# Patient Record
Sex: MALE | Race: WHITE | Employment: OTHER | URBAN - METROPOLITAN AREA
[De-identification: names, ages, dates, MRNs, and addresses within clinical notes are randomized per-mention and may not be internally consistent; named-entity substitution may affect disease eponyms.]

---

## 2017-08-27 ENCOUNTER — HOSPITAL ENCOUNTER (EMERGENCY)
Facility: CLINIC | Age: 72
Discharge: HOME | End: 2017-08-27
Attending: EMERGENCY MEDICINE

## 2017-08-27 VITALS
DIASTOLIC BLOOD PRESSURE: 84 MMHG | WEIGHT: 127 LBS | TEMPERATURE: 97.5 F | SYSTOLIC BLOOD PRESSURE: 100 MMHG | RESPIRATION RATE: 16 BRPM | BODY MASS INDEX: 18.18 KG/M2 | HEIGHT: 70 IN | OXYGEN SATURATION: 99 % | HEART RATE: 91 BPM

## 2017-08-27 DIAGNOSIS — E87.1 DEHYDRATION WITH HYPONATREMIA: ICD-10-CM

## 2017-08-27 DIAGNOSIS — R11.11 NON-INTRACTABLE VOMITING WITHOUT NAUSEA, UNSPECIFIED VOMITING TYPE: Primary | ICD-10-CM

## 2017-08-27 DIAGNOSIS — E86.0 DEHYDRATION WITH HYPONATREMIA: ICD-10-CM

## 2017-08-27 LAB
-: ABNORMAL
ABSOLUTE EOS #: 0 K/UL (ref 0–0.4)
ABSOLUTE LYMPH #: 1.1 K/UL (ref 1–4.8)
ABSOLUTE MONO #: 0.6 K/UL (ref 0.1–1.2)
ALBUMIN SERPL-MCNC: 3.9 G/DL (ref 3.5–5.2)
ALBUMIN/GLOBULIN RATIO: 1.3 (ref 1–2.5)
ALP BLD-CCNC: 95 U/L (ref 40–129)
ALT SERPL-CCNC: 38 U/L (ref 5–41)
AMORPHOUS: ABNORMAL
ANION GAP SERPL CALCULATED.3IONS-SCNC: 20 MMOL/L (ref 9–17)
AST SERPL-CCNC: 26 U/L
BACTERIA: ABNORMAL
BASOPHILS # BLD: 1 %
BASOPHILS ABSOLUTE: 0.1 K/UL (ref 0–0.2)
BILIRUB SERPL-MCNC: 2.4 MG/DL (ref 0.3–1.2)
BILIRUBIN URINE: ABNORMAL
BUN BLDV-MCNC: 13 MG/DL (ref 8–23)
BUN/CREAT BLD: ABNORMAL (ref 9–20)
CALCIUM SERPL-MCNC: 9.5 MG/DL (ref 8.6–10.4)
CASTS UA: ABNORMAL /LPF (ref 0–2)
CHLORIDE BLD-SCNC: 86 MMOL/L (ref 98–107)
CO2: 22 MMOL/L (ref 20–31)
COLOR: ABNORMAL
COMMENT UA: ABNORMAL
CREAT SERPL-MCNC: 0.7 MG/DL (ref 0.7–1.2)
CRYSTALS, UA: ABNORMAL /HPF
DIFFERENTIAL TYPE: ABNORMAL
EOSINOPHILS RELATIVE PERCENT: 0 %
EPITHELIAL CELLS UA: ABNORMAL /HPF (ref 0–5)
GFR AFRICAN AMERICAN: >60 ML/MIN
GFR NON-AFRICAN AMERICAN: >60 ML/MIN
GFR SERPL CREATININE-BSD FRML MDRD: ABNORMAL ML/MIN/{1.73_M2}
GFR SERPL CREATININE-BSD FRML MDRD: ABNORMAL ML/MIN/{1.73_M2}
GLUCOSE BLD-MCNC: 127 MG/DL (ref 70–99)
GLUCOSE URINE: NEGATIVE
HCT VFR BLD CALC: 39.3 % (ref 41–53)
HEMOGLOBIN: 13.4 G/DL (ref 13.5–17.5)
KETONES, URINE: ABNORMAL
LEUKOCYTE ESTERASE, URINE: ABNORMAL
LIPASE: 39 U/L (ref 13–60)
LYMPHOCYTES # BLD: 10 %
MCH RBC QN AUTO: 32.7 PG (ref 26–34)
MCHC RBC AUTO-ENTMCNC: 34.2 G/DL (ref 31–37)
MCV RBC AUTO: 95.6 FL (ref 80–100)
MONOCYTES # BLD: 5 %
MUCUS: ABNORMAL
NITRITE, URINE: NEGATIVE
OTHER OBSERVATIONS UA: ABNORMAL
PDW BLD-RTO: 12.8 % (ref 12.5–15.4)
PH UA: 5.5 (ref 5–8)
PLATELET # BLD: 175 K/UL (ref 140–450)
PLATELET ESTIMATE: ABNORMAL
PMV BLD AUTO: 10.9 FL (ref 6–12)
POTASSIUM SERPL-SCNC: 3.4 MMOL/L (ref 3.7–5.3)
PROTEIN UA: ABNORMAL
RBC # BLD: 4.11 M/UL (ref 4.5–5.9)
RBC # BLD: ABNORMAL 10*6/UL
RBC UA: ABNORMAL /HPF (ref 0–2)
RENAL EPITHELIAL, UA: ABNORMAL /HPF
SEG NEUTROPHILS: 84 %
SEGMENTED NEUTROPHILS ABSOLUTE COUNT: 9.9 K/UL (ref 1.8–7.7)
SODIUM BLD-SCNC: 128 MMOL/L (ref 135–144)
SPECIFIC GRAVITY UA: 1.01 (ref 1–1.03)
TOTAL PROTEIN: 7 G/DL (ref 6.4–8.3)
TRICHOMONAS: ABNORMAL
TURBIDITY: ABNORMAL
URINE HGB: ABNORMAL
UROBILINOGEN, URINE: ABNORMAL
WBC # BLD: 11.8 K/UL (ref 3.5–11)
WBC # BLD: ABNORMAL 10*3/UL
WBC UA: ABNORMAL /HPF (ref 0–5)
YEAST: ABNORMAL

## 2017-08-27 PROCEDURE — 36415 COLL VENOUS BLD VENIPUNCTURE: CPT

## 2017-08-27 PROCEDURE — 81001 URINALYSIS AUTO W/SCOPE: CPT

## 2017-08-27 PROCEDURE — 2580000003 HC RX 258: Performed by: EMERGENCY MEDICINE

## 2017-08-27 PROCEDURE — 83690 ASSAY OF LIPASE: CPT

## 2017-08-27 PROCEDURE — 96360 HYDRATION IV INFUSION INIT: CPT

## 2017-08-27 PROCEDURE — 87086 URINE CULTURE/COLONY COUNT: CPT

## 2017-08-27 PROCEDURE — 99284 EMERGENCY DEPT VISIT MOD MDM: CPT

## 2017-08-27 PROCEDURE — 6370000000 HC RX 637 (ALT 250 FOR IP): Performed by: EMERGENCY MEDICINE

## 2017-08-27 PROCEDURE — 85025 COMPLETE CBC W/AUTO DIFF WBC: CPT

## 2017-08-27 PROCEDURE — 80053 COMPREHEN METABOLIC PANEL: CPT

## 2017-08-27 RX ORDER — ONDANSETRON 4 MG/1
4 TABLET, ORALLY DISINTEGRATING ORAL EVERY 8 HOURS PRN
Qty: 6 TABLET | Refills: 0 | Status: SHIPPED | OUTPATIENT
Start: 2017-08-27 | End: 2020-09-19

## 2017-08-27 RX ORDER — LORAZEPAM 0.5 MG/1
0.5 TABLET ORAL ONCE
Status: COMPLETED | OUTPATIENT
Start: 2017-08-27 | End: 2017-08-27

## 2017-08-27 RX ORDER — ATENOLOL 25 MG/1
25 TABLET ORAL DAILY
COMMUNITY
End: 2020-09-19

## 2017-08-27 RX ORDER — 0.9 % SODIUM CHLORIDE 0.9 %
1000 INTRAVENOUS SOLUTION INTRAVENOUS ONCE
Status: COMPLETED | OUTPATIENT
Start: 2017-08-27 | End: 2017-08-27

## 2017-08-27 RX ADMIN — SODIUM CHLORIDE 1000 ML: 9 INJECTION, SOLUTION INTRAVENOUS at 22:17

## 2017-08-27 RX ADMIN — LORAZEPAM 0.5 MG: 0.5 TABLET ORAL at 22:47

## 2017-08-29 LAB
CULTURE: NORMAL
CULTURE: NORMAL
Lab: NORMAL
SPECIMEN DESCRIPTION: NORMAL
SPECIMEN DESCRIPTION: NORMAL
STATUS: NORMAL

## 2018-04-24 ENCOUNTER — HOSPITAL ENCOUNTER (OUTPATIENT)
Age: 73
Setting detail: SPECIMEN
Discharge: HOME | End: 2018-04-24

## 2018-04-24 LAB
ANION GAP SERPL CALCULATED.3IONS-SCNC: 12 MMOL/L (ref 9–17)
BUN BLDV-MCNC: 8 MG/DL (ref 8–23)
BUN/CREAT BLD: ABNORMAL (ref 9–20)
CALCIUM SERPL-MCNC: 8.4 MG/DL (ref 8.6–10.4)
CHLORIDE BLD-SCNC: 93 MMOL/L (ref 98–107)
CO2: 23 MMOL/L (ref 20–31)
CREAT SERPL-MCNC: 0.41 MG/DL (ref 0.7–1.2)
GFR AFRICAN AMERICAN: >60 ML/MIN
GFR NON-AFRICAN AMERICAN: >60 ML/MIN
GFR SERPL CREATININE-BSD FRML MDRD: ABNORMAL ML/MIN/{1.73_M2}
GFR SERPL CREATININE-BSD FRML MDRD: ABNORMAL ML/MIN/{1.73_M2}
GLUCOSE BLD-MCNC: 80 MG/DL (ref 70–99)
POTASSIUM SERPL-SCNC: 4.2 MMOL/L (ref 3.7–5.3)
SERUM OSMOLALITY: 265 MOSM/KG (ref 275–295)
SODIUM BLD-SCNC: 128 MMOL/L (ref 135–144)

## 2018-04-24 PROCEDURE — 83930 ASSAY OF BLOOD OSMOLALITY: CPT

## 2018-04-24 PROCEDURE — 80048 BASIC METABOLIC PNL TOTAL CA: CPT

## 2018-04-24 PROCEDURE — P9603 ONE-WAY ALLOW PRORATED MILES: HCPCS

## 2018-04-24 PROCEDURE — 36415 COLL VENOUS BLD VENIPUNCTURE: CPT

## 2018-04-26 ENCOUNTER — HOSPITAL ENCOUNTER (OUTPATIENT)
Age: 73
Setting detail: SPECIMEN
Discharge: HOME | End: 2018-04-26

## 2018-04-26 LAB
AMMONIA: 34 UMOL/L (ref 16–60)
ANION GAP SERPL CALCULATED.3IONS-SCNC: 14 MMOL/L (ref 9–17)
BUN BLDV-MCNC: 7 MG/DL (ref 8–23)
BUN/CREAT BLD: ABNORMAL (ref 9–20)
CALCIUM SERPL-MCNC: 8.4 MG/DL (ref 8.6–10.4)
CHLORIDE BLD-SCNC: 94 MMOL/L (ref 98–107)
CO2: 21 MMOL/L (ref 20–31)
CREAT SERPL-MCNC: 0.43 MG/DL (ref 0.7–1.2)
GFR AFRICAN AMERICAN: >60 ML/MIN
GFR NON-AFRICAN AMERICAN: >60 ML/MIN
GFR SERPL CREATININE-BSD FRML MDRD: ABNORMAL ML/MIN/{1.73_M2}
GFR SERPL CREATININE-BSD FRML MDRD: ABNORMAL ML/MIN/{1.73_M2}
GLUCOSE BLD-MCNC: 82 MG/DL (ref 70–99)
HCT VFR BLD CALC: 25.4 % (ref 40.7–50.3)
HEMOGLOBIN: 8 G/DL (ref 13–17)
MCH RBC QN AUTO: 30.7 PG (ref 25.2–33.5)
MCHC RBC AUTO-ENTMCNC: 31.5 G/DL (ref 28.4–34.8)
MCV RBC AUTO: 97.3 FL (ref 82.6–102.9)
NRBC AUTOMATED: 0 PER 100 WBC
PDW BLD-RTO: 15.6 % (ref 11.8–14.4)
PLATELET # BLD: 258 K/UL (ref 138–453)
PMV BLD AUTO: 11.7 FL (ref 8.1–13.5)
POTASSIUM SERPL-SCNC: 4.3 MMOL/L (ref 3.7–5.3)
PREALBUMIN: 14.8 MG/DL (ref 20–40)
RBC # BLD: 2.61 M/UL (ref 4.21–5.77)
SERUM OSMOLALITY: 280 MOSM/KG (ref 275–295)
SODIUM BLD-SCNC: 129 MMOL/L (ref 135–144)
VITAMIN D 25-HYDROXY: 13.1 NG/ML (ref 30–100)
WBC # BLD: 5.7 K/UL (ref 3.5–11.3)

## 2018-04-26 PROCEDURE — 82306 VITAMIN D 25 HYDROXY: CPT

## 2018-04-26 PROCEDURE — 87205 SMEAR GRAM STAIN: CPT

## 2018-04-26 PROCEDURE — 84134 ASSAY OF PREALBUMIN: CPT

## 2018-04-26 PROCEDURE — 85027 COMPLETE CBC AUTOMATED: CPT

## 2018-04-26 PROCEDURE — 87077 CULTURE AEROBIC IDENTIFY: CPT

## 2018-04-26 PROCEDURE — 86403 PARTICLE AGGLUT ANTBDY SCRN: CPT

## 2018-04-26 PROCEDURE — 80048 BASIC METABOLIC PNL TOTAL CA: CPT

## 2018-04-26 PROCEDURE — 87186 SC STD MICRODIL/AGAR DIL: CPT

## 2018-04-26 PROCEDURE — 87040 BLOOD CULTURE FOR BACTERIA: CPT

## 2018-04-26 PROCEDURE — 36415 COLL VENOUS BLD VENIPUNCTURE: CPT

## 2018-04-26 PROCEDURE — 87070 CULTURE OTHR SPECIMN AEROBIC: CPT

## 2018-04-26 PROCEDURE — 82140 ASSAY OF AMMONIA: CPT

## 2018-04-26 PROCEDURE — 83930 ASSAY OF BLOOD OSMOLALITY: CPT

## 2018-04-26 PROCEDURE — P9603 ONE-WAY ALLOW PRORATED MILES: HCPCS

## 2018-04-28 LAB
CULTURE: ABNORMAL
DIRECT EXAM: ABNORMAL
DIRECT EXAM: ABNORMAL
Lab: ABNORMAL
ORGANISM: ABNORMAL
ORGANISM: ABNORMAL
SPECIMEN DESCRIPTION: ABNORMAL
SPECIMEN DESCRIPTION: ABNORMAL
STATUS: ABNORMAL

## 2018-04-29 ENCOUNTER — HOSPITAL ENCOUNTER (OUTPATIENT)
Age: 73
Setting detail: SPECIMEN
Discharge: HOME | End: 2018-04-29

## 2018-04-30 ENCOUNTER — HOSPITAL ENCOUNTER (OUTPATIENT)
Age: 73
Setting detail: SPECIMEN
Discharge: HOME | End: 2018-04-30

## 2018-04-30 LAB
CULTURE: ABNORMAL
DATE, STOOL #1: NORMAL
DATE, STOOL #2: NORMAL
DATE, STOOL #3: NORMAL
HEMOCCULT SP1 STL QL: NEGATIVE
HEMOCCULT SP2 STL QL: NEGATIVE
HEMOCCULT SP3 STL QL: NORMAL
Lab: ABNORMAL
SPECIMEN DESCRIPTION: ABNORMAL
SPECIMEN DESCRIPTION: ABNORMAL
STATUS: ABNORMAL
TIME, STOOL #1: 1835
TIME, STOOL #2: 600
TIME, STOOL #3: NORMAL

## 2018-04-30 PROCEDURE — 82272 OCCULT BLD FECES 1-3 TESTS: CPT

## 2018-05-01 ENCOUNTER — HOSPITAL ENCOUNTER (OUTPATIENT)
Age: 73
Setting detail: SPECIMEN
Discharge: HOME | End: 2018-05-01

## 2018-05-01 LAB
ANION GAP SERPL CALCULATED.3IONS-SCNC: 11 MMOL/L (ref 9–17)
BUN BLDV-MCNC: 12 MG/DL (ref 8–23)
BUN/CREAT BLD: 19 (ref 9–20)
CALCIUM SERPL-MCNC: 8.9 MG/DL (ref 8.6–10.4)
CHLORIDE BLD-SCNC: 100 MMOL/L (ref 98–107)
CO2: 25 MMOL/L (ref 20–31)
CREAT SERPL-MCNC: 0.63 MG/DL (ref 0.7–1.2)
DATE, STOOL #1: NORMAL
DATE, STOOL #2: NORMAL
DATE, STOOL #3: NORMAL
GFR AFRICAN AMERICAN: >60 ML/MIN
GFR NON-AFRICAN AMERICAN: >60 ML/MIN
GFR SERPL CREATININE-BSD FRML MDRD: ABNORMAL ML/MIN/{1.73_M2}
GFR SERPL CREATININE-BSD FRML MDRD: ABNORMAL ML/MIN/{1.73_M2}
GLUCOSE BLD-MCNC: 95 MG/DL (ref 70–99)
HEMOCCULT SP1 STL QL: NEGATIVE
HEMOCCULT SP2 STL QL: NORMAL
HEMOCCULT SP3 STL QL: NORMAL
POTASSIUM SERPL-SCNC: 4.6 MMOL/L (ref 3.7–5.3)
SODIUM BLD-SCNC: 136 MMOL/L (ref 135–144)
TIME, STOOL #1: NORMAL
TIME, STOOL #2: NORMAL
TIME, STOOL #3: NORMAL

## 2018-05-01 PROCEDURE — 80048 BASIC METABOLIC PNL TOTAL CA: CPT

## 2018-05-01 PROCEDURE — 82272 OCCULT BLD FECES 1-3 TESTS: CPT

## 2018-05-11 ENCOUNTER — HOSPITAL ENCOUNTER (OUTPATIENT)
Age: 73
Setting detail: SPECIMEN
Discharge: HOME | End: 2018-05-11

## 2018-05-11 LAB
ANION GAP SERPL CALCULATED.3IONS-SCNC: 11 MMOL/L (ref 9–17)
BUN BLDV-MCNC: 12 MG/DL (ref 8–23)
BUN/CREAT BLD: ABNORMAL (ref 9–20)
CALCIUM SERPL-MCNC: 8.8 MG/DL (ref 8.6–10.4)
CHLORIDE BLD-SCNC: 103 MMOL/L (ref 98–107)
CO2: 25 MMOL/L (ref 20–31)
CREAT SERPL-MCNC: 0.44 MG/DL (ref 0.7–1.2)
GFR AFRICAN AMERICAN: >60 ML/MIN
GFR NON-AFRICAN AMERICAN: >60 ML/MIN
GFR SERPL CREATININE-BSD FRML MDRD: ABNORMAL ML/MIN/{1.73_M2}
GFR SERPL CREATININE-BSD FRML MDRD: ABNORMAL ML/MIN/{1.73_M2}
GLUCOSE BLD-MCNC: 95 MG/DL (ref 70–99)
HCT VFR BLD CALC: 26.8 % (ref 40.7–50.3)
HEMOGLOBIN: 8.4 G/DL (ref 13–17)
MCH RBC QN AUTO: 30.2 PG (ref 25.2–33.5)
MCHC RBC AUTO-ENTMCNC: 31.3 G/DL (ref 28.4–34.8)
MCV RBC AUTO: 96.4 FL (ref 82.6–102.9)
NRBC AUTOMATED: 0 PER 100 WBC
PDW BLD-RTO: 13.9 % (ref 11.8–14.4)
PLATELET # BLD: 161 K/UL (ref 138–453)
PMV BLD AUTO: 12.1 FL (ref 8.1–13.5)
POTASSIUM SERPL-SCNC: 4.3 MMOL/L (ref 3.7–5.3)
RBC # BLD: 2.78 M/UL (ref 4.21–5.77)
SODIUM BLD-SCNC: 139 MMOL/L (ref 135–144)
WBC # BLD: 4.9 K/UL (ref 3.5–11.3)

## 2018-05-11 PROCEDURE — 85027 COMPLETE CBC AUTOMATED: CPT

## 2018-05-11 PROCEDURE — 80048 BASIC METABOLIC PNL TOTAL CA: CPT

## 2018-05-11 PROCEDURE — 36415 COLL VENOUS BLD VENIPUNCTURE: CPT

## 2018-05-11 PROCEDURE — P9603 ONE-WAY ALLOW PRORATED MILES: HCPCS

## 2018-06-01 ENCOUNTER — HOSPITAL ENCOUNTER (OUTPATIENT)
Age: 73
Setting detail: SPECIMEN
Discharge: HOME | End: 2018-06-01

## 2018-06-01 LAB
HCT VFR BLD CALC: 28.7 % (ref 40.7–50.3)
HEMOGLOBIN: 8.8 G/DL (ref 13–17)

## 2018-06-01 PROCEDURE — 85018 HEMOGLOBIN: CPT

## 2018-06-01 PROCEDURE — 85014 HEMATOCRIT: CPT

## 2018-06-01 PROCEDURE — P9603 ONE-WAY ALLOW PRORATED MILES: HCPCS

## 2018-06-01 PROCEDURE — 36415 COLL VENOUS BLD VENIPUNCTURE: CPT

## 2018-06-22 ENCOUNTER — HOSPITAL ENCOUNTER (OUTPATIENT)
Age: 73
Setting detail: SPECIMEN
Discharge: HOME | End: 2018-06-22
Payer: MEDICARE

## 2018-06-22 LAB
ANION GAP SERPL CALCULATED.3IONS-SCNC: 11 MMOL/L (ref 9–17)
BUN BLDV-MCNC: 10 MG/DL (ref 8–23)
BUN/CREAT BLD: 19 (ref 9–20)
CALCIUM SERPL-MCNC: 9.1 MG/DL (ref 8.6–10.4)
CHLORIDE BLD-SCNC: 102 MMOL/L (ref 98–107)
CO2: 26 MMOL/L (ref 20–31)
CREAT SERPL-MCNC: 0.53 MG/DL (ref 0.7–1.2)
GFR AFRICAN AMERICAN: >60 ML/MIN
GFR NON-AFRICAN AMERICAN: >60 ML/MIN
GFR SERPL CREATININE-BSD FRML MDRD: ABNORMAL ML/MIN/{1.73_M2}
GFR SERPL CREATININE-BSD FRML MDRD: ABNORMAL ML/MIN/{1.73_M2}
GLUCOSE BLD-MCNC: 89 MG/DL (ref 70–99)
HCT VFR BLD CALC: 30.9 % (ref 41–53)
HEMOGLOBIN: 10.1 G/DL (ref 13.5–17.5)
MCH RBC QN AUTO: 30.1 PG (ref 26–34)
MCHC RBC AUTO-ENTMCNC: 32.6 G/DL (ref 31–37)
MCV RBC AUTO: 92.2 FL (ref 80–100)
NRBC AUTOMATED: ABNORMAL PER 100 WBC
PDW BLD-RTO: 14.7 % (ref 11.5–14.5)
PLATELET # BLD: 181 K/UL (ref 130–400)
PMV BLD AUTO: 10.1 FL (ref 6–12)
POTASSIUM SERPL-SCNC: 4.3 MMOL/L (ref 3.7–5.3)
PREALBUMIN: 17.1 MG/DL (ref 20–40)
RBC # BLD: 3.35 M/UL (ref 4.5–5.9)
SODIUM BLD-SCNC: 139 MMOL/L (ref 135–144)
VITAMIN D 25-HYDROXY: 26.9 NG/ML (ref 30–100)
WBC # BLD: 6.5 K/UL (ref 3.5–11)

## 2018-06-22 PROCEDURE — P9603 ONE-WAY ALLOW PRORATED MILES: HCPCS

## 2018-06-22 PROCEDURE — 84134 ASSAY OF PREALBUMIN: CPT

## 2018-06-22 PROCEDURE — 36415 COLL VENOUS BLD VENIPUNCTURE: CPT

## 2018-06-22 PROCEDURE — 82306 VITAMIN D 25 HYDROXY: CPT

## 2018-06-22 PROCEDURE — 80048 BASIC METABOLIC PNL TOTAL CA: CPT

## 2018-06-22 PROCEDURE — 85027 COMPLETE CBC AUTOMATED: CPT

## 2020-09-19 ENCOUNTER — APPOINTMENT (OUTPATIENT)
Dept: CT IMAGING | Age: 75
DRG: 871 | End: 2020-09-19
Payer: COMMERCIAL

## 2020-09-19 ENCOUNTER — APPOINTMENT (OUTPATIENT)
Dept: GENERAL RADIOLOGY | Age: 75
DRG: 871 | End: 2020-09-19
Payer: COMMERCIAL

## 2020-09-19 ENCOUNTER — HOSPITAL ENCOUNTER (INPATIENT)
Age: 75
LOS: 10 days | Discharge: NURSING, REHAB OR LONG-TERM CARE | DRG: 871 | End: 2020-09-29
Attending: EMERGENCY MEDICINE | Admitting: INTERNAL MEDICINE
Payer: COMMERCIAL

## 2020-09-19 PROBLEM — R79.89 ELEVATED BRAIN NATRIURETIC PEPTIDE (BNP) LEVEL: Status: ACTIVE | Noted: 2020-09-19

## 2020-09-19 PROBLEM — I48.91 ATRIAL FIBRILLATION WITH RVR (HCC): Status: ACTIVE | Noted: 2020-09-19

## 2020-09-19 LAB
ABSOLUTE EOS #: <0.03 K/UL (ref 0–0.44)
ABSOLUTE IMMATURE GRANULOCYTE: 0.4 K/UL (ref 0–0.3)
ABSOLUTE LYMPH #: 1.43 K/UL (ref 1.1–3.7)
ABSOLUTE MONO #: 0.83 K/UL (ref 0.1–1.2)
AMYLASE: 41 U/L (ref 28–100)
ANION GAP SERPL CALCULATED.3IONS-SCNC: 12 MMOL/L (ref 9–17)
BASOPHILS # BLD: 0 % (ref 0–2)
BASOPHILS ABSOLUTE: <0.03 K/UL (ref 0–0.2)
BILIRUBIN URINE: NEGATIVE
BNP INTERPRETATION: ABNORMAL
BUN BLDV-MCNC: 17 MG/DL (ref 8–23)
BUN/CREAT BLD: 29 (ref 9–20)
C-REACTIVE PROTEIN: 99 MG/L (ref 0–5)
CALCIUM SERPL-MCNC: 7.3 MG/DL (ref 8.6–10.4)
CHLORIDE BLD-SCNC: 100 MMOL/L (ref 98–107)
CO2: 20 MMOL/L (ref 20–31)
COLOR: YELLOW
COMMENT UA: ABNORMAL
CREAT SERPL-MCNC: 0.58 MG/DL (ref 0.7–1.2)
DIFFERENTIAL TYPE: ABNORMAL
EOSINOPHILS RELATIVE PERCENT: 0 % (ref 1–4)
GFR AFRICAN AMERICAN: >60 ML/MIN
GFR NON-AFRICAN AMERICAN: >60 ML/MIN
GFR SERPL CREATININE-BSD FRML MDRD: ABNORMAL ML/MIN/{1.73_M2}
GFR SERPL CREATININE-BSD FRML MDRD: ABNORMAL ML/MIN/{1.73_M2}
GLUCOSE BLD-MCNC: 82 MG/DL (ref 70–99)
GLUCOSE URINE: NEGATIVE
HCT VFR BLD CALC: 26.7 % (ref 40.7–50.3)
HEMOGLOBIN: 7.6 G/DL (ref 13–17)
IMMATURE GRANULOCYTES: 2 %
INR BLD: 1.1
KETONES, URINE: ABNORMAL
LACTIC ACID, SEPSIS WHOLE BLOOD: NORMAL MMOL/L (ref 0.5–1.9)
LACTIC ACID, SEPSIS WHOLE BLOOD: NORMAL MMOL/L (ref 0.5–1.9)
LACTIC ACID, SEPSIS: 1.2 MMOL/L (ref 0.5–1.9)
LACTIC ACID, SEPSIS: 1.8 MMOL/L (ref 0.5–1.9)
LACTIC ACID: 0.8 MMOL/L (ref 0.5–2.2)
LACTIC ACID: 2.7 MMOL/L (ref 0.5–2.2)
LEUKOCYTE ESTERASE, URINE: NEGATIVE
LIPASE: 5 U/L (ref 13–60)
LYMPHOCYTES # BLD: 8 % (ref 24–43)
MAGNESIUM: 1.8 MG/DL (ref 1.6–2.6)
MCH RBC QN AUTO: 21.8 PG (ref 25.2–33.5)
MCHC RBC AUTO-ENTMCNC: 28.5 G/DL (ref 28.4–34.8)
MCV RBC AUTO: 76.5 FL (ref 82.6–102.9)
MONOCYTES # BLD: 5 % (ref 3–12)
NITRITE, URINE: NEGATIVE
NRBC AUTOMATED: 0 PER 100 WBC
PARTIAL THROMBOPLASTIN TIME: 34.2 SEC (ref 23.9–33.8)
PDW BLD-RTO: 18 % (ref 11.8–14.4)
PH UA: 5.5 (ref 5–8)
PLATELET # BLD: 502 K/UL (ref 138–453)
PLATELET ESTIMATE: ABNORMAL
PMV BLD AUTO: 10 FL (ref 8.1–13.5)
POTASSIUM SERPL-SCNC: 3.8 MMOL/L (ref 3.7–5.3)
PRO-BNP: 800 PG/ML
PROTEIN UA: NEGATIVE
PROTHROMBIN TIME: 13.7 SEC (ref 11.5–14.2)
RBC # BLD: 3.49 M/UL (ref 4.21–5.77)
RBC # BLD: ABNORMAL 10*6/UL
SEDIMENTATION RATE, ERYTHROCYTE: 12 MM (ref 0–20)
SEG NEUTROPHILS: 84 % (ref 36–65)
SEGMENTED NEUTROPHILS ABSOLUTE COUNT: 14.37 K/UL (ref 1.5–8.1)
SODIUM BLD-SCNC: 132 MMOL/L (ref 135–144)
SPECIFIC GRAVITY UA: 1.02 (ref 1–1.03)
TROPONIN INTERP: NORMAL
TROPONIN INTERP: NORMAL
TROPONIN T: NORMAL NG/ML
TROPONIN T: NORMAL NG/ML
TROPONIN, HIGH SENSITIVITY: 12 NG/L (ref 0–22)
TROPONIN, HIGH SENSITIVITY: 21 NG/L (ref 0–22)
TURBIDITY: CLEAR
URINE HGB: NEGATIVE
UROBILINOGEN, URINE: NORMAL
WBC # BLD: 17.1 K/UL (ref 3.5–11.3)
WBC # BLD: ABNORMAL 10*3/UL

## 2020-09-19 PROCEDURE — 2500000003 HC RX 250 WO HCPCS: Performed by: EMERGENCY MEDICINE

## 2020-09-19 PROCEDURE — 84145 PROCALCITONIN (PCT): CPT

## 2020-09-19 PROCEDURE — 2060000000 HC ICU INTERMEDIATE R&B

## 2020-09-19 PROCEDURE — 2580000003 HC RX 258: Performed by: EMERGENCY MEDICINE

## 2020-09-19 PROCEDURE — 82150 ASSAY OF AMYLASE: CPT

## 2020-09-19 PROCEDURE — 85610 PROTHROMBIN TIME: CPT

## 2020-09-19 PROCEDURE — 86140 C-REACTIVE PROTEIN: CPT

## 2020-09-19 PROCEDURE — 85025 COMPLETE CBC W/AUTO DIFF WBC: CPT

## 2020-09-19 PROCEDURE — 83690 ASSAY OF LIPASE: CPT

## 2020-09-19 PROCEDURE — 85730 THROMBOPLASTIN TIME PARTIAL: CPT

## 2020-09-19 PROCEDURE — 2580000003 HC RX 258: Performed by: INTERNAL MEDICINE

## 2020-09-19 PROCEDURE — 2580000003 HC RX 258: Performed by: FAMILY MEDICINE

## 2020-09-19 PROCEDURE — 83880 ASSAY OF NATRIURETIC PEPTIDE: CPT

## 2020-09-19 PROCEDURE — 80048 BASIC METABOLIC PNL TOTAL CA: CPT

## 2020-09-19 PROCEDURE — 87040 BLOOD CULTURE FOR BACTERIA: CPT

## 2020-09-19 PROCEDURE — 6360000004 HC RX CONTRAST MEDICATION: Performed by: EMERGENCY MEDICINE

## 2020-09-19 PROCEDURE — 93005 ELECTROCARDIOGRAM TRACING: CPT | Performed by: EMERGENCY MEDICINE

## 2020-09-19 PROCEDURE — 83735 ASSAY OF MAGNESIUM: CPT

## 2020-09-19 PROCEDURE — 99223 1ST HOSP IP/OBS HIGH 75: CPT | Performed by: FAMILY MEDICINE

## 2020-09-19 PROCEDURE — 36415 COLL VENOUS BLD VENIPUNCTURE: CPT

## 2020-09-19 PROCEDURE — 6360000002 HC RX W HCPCS: Performed by: FAMILY MEDICINE

## 2020-09-19 PROCEDURE — 82533 TOTAL CORTISOL: CPT

## 2020-09-19 PROCEDURE — 71260 CT THORAX DX C+: CPT

## 2020-09-19 PROCEDURE — 99285 EMERGENCY DEPT VISIT HI MDM: CPT

## 2020-09-19 PROCEDURE — 96365 THER/PROPH/DIAG IV INF INIT: CPT

## 2020-09-19 PROCEDURE — 6370000000 HC RX 637 (ALT 250 FOR IP): Performed by: FAMILY MEDICINE

## 2020-09-19 PROCEDURE — 87150 DNA/RNA AMPLIFIED PROBE: CPT

## 2020-09-19 PROCEDURE — 83605 ASSAY OF LACTIC ACID: CPT

## 2020-09-19 PROCEDURE — 71045 X-RAY EXAM CHEST 1 VIEW: CPT

## 2020-09-19 PROCEDURE — 81003 URINALYSIS AUTO W/O SCOPE: CPT

## 2020-09-19 PROCEDURE — 87205 SMEAR GRAM STAIN: CPT

## 2020-09-19 PROCEDURE — 84484 ASSAY OF TROPONIN QUANT: CPT

## 2020-09-19 PROCEDURE — 96361 HYDRATE IV INFUSION ADD-ON: CPT

## 2020-09-19 PROCEDURE — 85652 RBC SED RATE AUTOMATED: CPT

## 2020-09-19 PROCEDURE — 2500000003 HC RX 250 WO HCPCS: Performed by: INTERNAL MEDICINE

## 2020-09-19 PROCEDURE — 74177 CT ABD & PELVIS W/CONTRAST: CPT

## 2020-09-19 RX ORDER — AMOXICILLIN 250 MG
2 CAPSULE ORAL DAILY
COMMUNITY

## 2020-09-19 RX ORDER — ESCITALOPRAM OXALATE 10 MG/1
20 TABLET ORAL DAILY
Status: DISCONTINUED | OUTPATIENT
Start: 2020-09-19 | End: 2020-09-29 | Stop reason: HOSPADM

## 2020-09-19 RX ORDER — DILTIAZEM HYDROCHLORIDE 5 MG/ML
10 INJECTION INTRAVENOUS ONCE
Status: COMPLETED | OUTPATIENT
Start: 2020-09-19 | End: 2020-09-19

## 2020-09-19 RX ORDER — ERGOCALCIFEROL 1.25 MG/1
50000 CAPSULE ORAL
COMMUNITY

## 2020-09-19 RX ORDER — ERGOCALCIFEROL 1.25 MG/1
50000 CAPSULE ORAL
Status: DISCONTINUED | OUTPATIENT
Start: 2020-10-05 | End: 2020-09-29 | Stop reason: HOSPADM

## 2020-09-19 RX ORDER — HYDROCODONE BITARTRATE AND ACETAMINOPHEN 7.5; 325 MG/1; MG/1
1 TABLET ORAL EVERY 6 HOURS PRN
COMMUNITY

## 2020-09-19 RX ORDER — ACETAMINOPHEN 650 MG/1
650 SUPPOSITORY RECTAL EVERY 6 HOURS PRN
Status: DISCONTINUED | OUTPATIENT
Start: 2020-09-19 | End: 2020-09-20

## 2020-09-19 RX ORDER — ACETAMINOPHEN 325 MG/1
650 TABLET ORAL EVERY 6 HOURS PRN
Status: DISCONTINUED | OUTPATIENT
Start: 2020-09-19 | End: 2020-09-20

## 2020-09-19 RX ORDER — ALENDRONATE SODIUM 70 MG/1
70 TABLET ORAL
COMMUNITY

## 2020-09-19 RX ORDER — MAGNESIUM OXIDE 400 MG/1
400 TABLET ORAL DAILY
COMMUNITY

## 2020-09-19 RX ORDER — 0.9 % SODIUM CHLORIDE 0.9 %
80 INTRAVENOUS SOLUTION INTRAVENOUS ONCE
Status: COMPLETED | OUTPATIENT
Start: 2020-09-19 | End: 2020-09-19

## 2020-09-19 RX ORDER — TAMSULOSIN HYDROCHLORIDE 0.4 MG/1
0.4 CAPSULE ORAL DAILY
COMMUNITY

## 2020-09-19 RX ORDER — PANTOPRAZOLE SODIUM 40 MG/1
40 TABLET, DELAYED RELEASE ORAL
Status: DISCONTINUED | OUTPATIENT
Start: 2020-09-20 | End: 2020-09-29 | Stop reason: HOSPADM

## 2020-09-19 RX ORDER — SODIUM CHLORIDE 0.9 % (FLUSH) 0.9 %
10 SYRINGE (ML) INJECTION EVERY 12 HOURS SCHEDULED
Status: DISCONTINUED | OUTPATIENT
Start: 2020-09-19 | End: 2020-09-29 | Stop reason: HOSPADM

## 2020-09-19 RX ORDER — LANOLIN ALCOHOL/MO/W.PET/CERES
325 CREAM (GRAM) TOPICAL
COMMUNITY

## 2020-09-19 RX ORDER — GABAPENTIN 300 MG/1
300 CAPSULE ORAL 3 TIMES DAILY
Status: DISCONTINUED | OUTPATIENT
Start: 2020-09-19 | End: 2020-09-29 | Stop reason: HOSPADM

## 2020-09-19 RX ORDER — DUTASTERIDE 0.5 MG/1
0.5 CAPSULE, LIQUID FILLED ORAL DAILY
COMMUNITY

## 2020-09-19 RX ORDER — MIRTAZAPINE 15 MG/1
15 TABLET, FILM COATED ORAL NIGHTLY
COMMUNITY

## 2020-09-19 RX ORDER — MIDODRINE HYDROCHLORIDE 10 MG/1
10 TABLET ORAL ONCE
Status: DISCONTINUED | OUTPATIENT
Start: 2020-09-19 | End: 2020-09-21

## 2020-09-19 RX ORDER — PANTOPRAZOLE SODIUM 40 MG/1
40 TABLET, DELAYED RELEASE ORAL 2 TIMES DAILY
COMMUNITY

## 2020-09-19 RX ORDER — BETHANECHOL CHLORIDE 25 MG/1
25 TABLET ORAL 3 TIMES DAILY
COMMUNITY

## 2020-09-19 RX ORDER — SODIUM CHLORIDE 0.9 % (FLUSH) 0.9 %
10 SYRINGE (ML) INJECTION PRN
Status: DISCONTINUED | OUTPATIENT
Start: 2020-09-19 | End: 2020-09-29 | Stop reason: HOSPADM

## 2020-09-19 RX ORDER — ESCITALOPRAM OXALATE 20 MG/1
20 TABLET ORAL DAILY
COMMUNITY

## 2020-09-19 RX ORDER — DIGOXIN 125 MCG
125 TABLET ORAL DAILY
Status: DISCONTINUED | OUTPATIENT
Start: 2020-09-19 | End: 2020-09-29 | Stop reason: HOSPADM

## 2020-09-19 RX ORDER — LIDOCAINE 4 G/G
1 PATCH TOPICAL DAILY
COMMUNITY

## 2020-09-19 RX ORDER — SODIUM CHLORIDE 9 MG/ML
INJECTION, SOLUTION INTRAVENOUS CONTINUOUS
Status: DISCONTINUED | OUTPATIENT
Start: 2020-09-19 | End: 2020-09-21

## 2020-09-19 RX ORDER — MIDODRINE HYDROCHLORIDE 10 MG/1
10 TABLET ORAL 2 TIMES DAILY
Status: DISCONTINUED | OUTPATIENT
Start: 2020-09-19 | End: 2020-09-20

## 2020-09-19 RX ORDER — 0.9 % SODIUM CHLORIDE 0.9 %
1000 INTRAVENOUS SOLUTION INTRAVENOUS ONCE
Status: COMPLETED | OUTPATIENT
Start: 2020-09-19 | End: 2020-09-19

## 2020-09-19 RX ORDER — GABAPENTIN 600 MG/1
600 TABLET ORAL 3 TIMES DAILY
COMMUNITY

## 2020-09-19 RX ORDER — MIRTAZAPINE 15 MG/1
15 TABLET, FILM COATED ORAL NIGHTLY
Status: DISCONTINUED | OUTPATIENT
Start: 2020-09-19 | End: 2020-09-29 | Stop reason: HOSPADM

## 2020-09-19 RX ORDER — LANOLIN ALCOHOL/MO/W.PET/CERES
325 CREAM (GRAM) TOPICAL
Status: DISCONTINUED | OUTPATIENT
Start: 2020-09-19 | End: 2020-09-29 | Stop reason: HOSPADM

## 2020-09-19 RX ADMIN — MIRTAZAPINE 15 MG: 15 TABLET, FILM COATED ORAL at 22:44

## 2020-09-19 RX ADMIN — IOPAMIDOL 75 ML: 755 INJECTION, SOLUTION INTRAVENOUS at 12:48

## 2020-09-19 RX ADMIN — DILTIAZEM HYDROCHLORIDE 10 MG: 5 INJECTION INTRAVENOUS at 13:58

## 2020-09-19 RX ADMIN — CEFTRIAXONE SODIUM 1 G: 1 INJECTION, POWDER, FOR SOLUTION INTRAMUSCULAR; INTRAVENOUS at 20:00

## 2020-09-19 RX ADMIN — SODIUM CHLORIDE: 9 INJECTION, SOLUTION INTRAVENOUS at 19:00

## 2020-09-19 RX ADMIN — DILTIAZEM HYDROCHLORIDE 5 MG/HR: 5 INJECTION INTRAVENOUS at 19:20

## 2020-09-19 RX ADMIN — SODIUM CHLORIDE 80 ML: 9 INJECTION, SOLUTION INTRAVENOUS at 12:48

## 2020-09-19 RX ADMIN — DILTIAZEM HYDROCHLORIDE 15 MG/HR: 5 INJECTION INTRAVENOUS at 20:42

## 2020-09-19 RX ADMIN — SODIUM CHLORIDE 1000 ML: 9 INJECTION, SOLUTION INTRAVENOUS at 12:00

## 2020-09-19 RX ADMIN — DIGOXIN 125 MCG: 125 TABLET ORAL at 19:49

## 2020-09-19 RX ADMIN — Medication 10 ML: at 12:48

## 2020-09-19 RX ADMIN — GABAPENTIN 300 MG: 300 CAPSULE ORAL at 22:44

## 2020-09-19 ASSESSMENT — ENCOUNTER SYMPTOMS
ABDOMINAL PAIN: 0
DIARRHEA: 0
EYE REDNESS: 0
RHINORRHEA: 0
STRIDOR: 0
COLOR CHANGE: 0
NAUSEA: 1
EYE DISCHARGE: 0
CONSTIPATION: 0
NAUSEA: 0
COUGH: 0
SORE THROAT: 0
DIARRHEA: 1
WHEEZING: 0
SHORTNESS OF BREATH: 0
BLOOD IN STOOL: 0
VOMITING: 0

## 2020-09-19 NOTE — ED PROVIDER NOTES
Takes on Saturdays    BETHANECHOL (URECHOLINE) 25 MG TABLET    Take 25 mg by mouth 3 times daily    BISACODYL (DULCOLAX) 5 MG EC TABLET    Take 5 mg by mouth every 12 hours as needed for Constipation    DUTASTERIDE (AVODART) 0.5 MG CAPSULE    Take 0.5 mg by mouth daily    ESCITALOPRAM (LEXAPRO) 20 MG TABLET    Take 20 mg by mouth daily    FERROUS SULFATE (FE TABS 325) 325 (65 FE) MG EC TABLET    Take 325 mg by mouth 3 times daily (with meals)    GABAPENTIN (NEURONTIN) 600 MG TABLET    Take 600 mg by mouth 3 times daily. HYDROCODONE-ACETAMINOPHEN (NORCO) 7.5-325 MG PER TABLET    Take 1 tablet by mouth every 6 hours as needed for Pain. LIDOCAINE 4 % EXTERNAL PATCH    Place 1 patch onto the skin daily 12 hours on, 12 hours off. MAGNESIUM OXIDE (MAG-OX) 400 MG TABLET    Take 400 mg by mouth daily    MIRTAZAPINE (REMERON) 15 MG TABLET    Take 15 mg by mouth nightly    PANTOPRAZOLE (PROTONIX) 40 MG TABLET    Take 40 mg by mouth 2 times daily    SENNA-DOCUSATE (PERICOLACE) 8.6-50 MG PER TABLET    Take 2 tablets by mouth daily    TAMSULOSIN (FLOMAX) 0.4 MG CAPSULE    Take 0.4 mg by mouth daily    VITAMIN D (ERGOCALCIFEROL) 1.25 MG (61287 UT) CAPS CAPSULE    Take 50,000 Units by mouth every 30 days Takes on the first Monday of every month     ALLERGIES     has No Known Allergies. FAMILY HISTORY     has no family status information on file. SOCIAL HISTORY       Social History     Tobacco Use    Smoking status: Current Every Day Smoker     Packs/day: 0.50    Smokeless tobacco: Never Used   Substance Use Topics    Alcohol use: Yes     Comment: former alcoholic    Drug use: No     PHYSICAL EXAM     INITIAL VITALS: BP (!) 98/54   Pulse 100   Temp 96 °F (35.6 °C) (Oral)   Resp 16   Ht 5' 10\" (1.778 m)   Wt 127 lb (57.6 kg)   SpO2 100%   BMI 18.22 kg/m²    Physical Exam  Constitutional:       Appearance: Normal appearance. He is well-developed. He is not ill-appearing or toxic-appearing.    HENT:      Head: Normocephalic and atraumatic. Eyes:      Conjunctiva/sclera: Conjunctivae normal.      Pupils: Pupils are equal, round, and reactive to light. Neck:      Musculoskeletal: Normal range of motion and neck supple. Trachea: Trachea normal.   Cardiovascular:      Rate and Rhythm: Tachycardia present. Rhythm irregularly irregular. Heart sounds: S1 normal and S2 normal. No murmur. Pulmonary:      Effort: Pulmonary effort is normal. No accessory muscle usage or respiratory distress. Breath sounds: Normal breath sounds. Chest:      Chest wall: No deformity or tenderness. Abdominal:      General: Bowel sounds are normal. There is no distension or abdominal bruit. Palpations: Abdomen is not rigid. Tenderness: There is no abdominal tenderness. There is no guarding or rebound. Skin:     General: Skin is warm. Findings: No rash. Neurological:      Mental Status: He is alert and oriented to person, place, and time. GCS: GCS eye subscore is 4. GCS verbal subscore is 5. GCS motor subscore is 6. Psychiatric:         Speech: Speech normal.         MEDICAL DECISION MAKIN-year-old male presents with complaints of dizziness, mildly diminished blood pressure. Patient is mildly tachycardic with atrial fibrillation, plan is basic labs IV fluids, rule out infectious processes and sepsis. 2:51 PM EDT  No obvious source of infection identified, the patient does not have any significant wounds or cellulitis. Patient was discussed with the hospitalist service we will admit for further evaluation.          CRITICAL CARE:       PROCEDURES:    Procedures    DIAGNOSTIC RESULTS   EKG:All EKG's are interpreted by the Emergency Department Physician who either signs or Co-signs this chart in the absence of a cardiologist.  Patient's EKG shows atrial fibrillation with a rate of 121, CO QRS QTC intervals unremarkable patient has normal axis, no ST elevations or depressions, no significant T wave changes. Nonspecific EKG. RADIOLOGY:All plain film, CT, MRI, and formal ultrasound images (except ED bedside ultrasound) are read by the radiologist, see reports below, unless otherwisenoted in MDM or here. CT ABDOMEN PELVIS W IV CONTRAST   Preliminary Result   1. Moderate right and small left pleural effusions with compressive   atelectasis at the lung bases in this patient with emphysematous changes. 2.  Prominent gastric fold thickening most compatible with gastritis. 3.  Liver appears slightly lobular. Additionally, abdominal ascites is   noted. The constellation of findings are consistent with cirrhosis. 4.  Enlarged prostate. Bladder wall appears thickened although bladder is   suboptimally distended. 5.  Atherosclerotic disease. 6.  No bowel obstruction or urinary obstruction although gallstones are   present. No evidence of appendicitis. CT CHEST PULMONARY EMBOLISM W CONTRAST   Final Result   1. No evidence of pulmonary embolism. 2.  Emphysematous changes. 3. small to moderate right pleural effusion with trace left effusion and   basilar atelectasis. 4.  Mild ascites upper abdomen. XR CHEST PORTABLE   Final Result   1. Right lower lobe airspace opacity potentially due to atelectasis,   pneumonia, or aspiration. 2. Bony demineralization partially limits evaluation for fractures. LABS: All lab results were reviewed by myself, and all abnormals are listed below.   Labs Reviewed   BASIC METABOLIC PANEL - Abnormal; Notable for the following components:       Result Value    CREATININE 0.58 (*)     Bun/Cre Ratio 29 (*)     Calcium 7.3 (*)     Sodium 132 (*)     All other components within normal limits   BRAIN NATRIURETIC PEPTIDE - Abnormal; Notable for the following components:    Pro- (*)     All other components within normal limits   CBC WITH AUTO DIFFERENTIAL - Abnormal; Notable for the following components:    WBC 17.1 (*)     RBC 3.49 (*)     Hemoglobin 7.6 (*)     Hematocrit 26.7 (*)     MCV 76.5 (*)     MCH 21.8 (*)     RDW 18.0 (*)     Platelets 900 (*)     Seg Neutrophils 84 (*)     Lymphocytes 8 (*)     Eosinophils % 0 (*)     Immature Granulocytes 2 (*)     Segs Absolute 14.37 (*)     Absolute Immature Granulocyte 0.40 (*)     All other components within normal limits   APTT - Abnormal; Notable for the following components:    PTT 34.2 (*)     All other components within normal limits   LACTIC ACID - Abnormal; Notable for the following components:    Lactic Acid 2.7 (*)     All other components within normal limits   URINALYSIS - Abnormal; Notable for the following components:    Ketones, Urine 1+ (*)     All other components within normal limits   CULTURE, BLOOD 1   CULTURE, BLOOD 1   MAGNESIUM   PROTIME-INR   LACTIC ACID   TROPONIN   TROPONIN   SEDIMENTATION RATE   C-REACTIVE PROTEIN       EMERGENCY DEPARTMENTCOURSE:         Vitals:    Vitals:    09/19/20 1417 09/19/20 1418 09/19/20 1419 09/19/20 1420   BP:       Pulse: 104 101 107 100   Resp:       Temp:       TempSrc:       SpO2:       Weight:       Height:           The patient was given the following medications while in the emergency department:  Orders Placed This Encounter   Medications    0.9 % sodium chloride bolus    0.9 % sodium chloride bolus    sodium chloride flush 0.9 % injection 10 mL    iopamidol (ISOVUE-370) 76 % injection 75 mL    dilTIAZem injection 10 mg    midodrine (PROAMATINE) tablet 10 mg     CONSULTS:  IP CONSULT TO HOSPITALIST  IP CONSULT TO CARDIOLOGY    FINAL IMPRESSION      1. Dizziness    2. Atrial fibrillation with RVR ([address]    3. Acute on chronic congestive heart failure, unspecified heart failure type [facility])          DISPOSITION/PLAN   DISPOSITION Admitted 09/19/2020 02:46:13 PM      PATIENT REFERRED TO:  No follow-up provider specified.   DISCHARGE MEDICATIONS:  New Prescriptions    No medications on file     Chase Watson MD  Attending Emergency Physician                    Gisele Perez MD  09/19/20 [id number]

## 2020-09-19 NOTE — ED NOTES
Pt does not know medications he takes daily. Pt states where he lives \"[facility]\" manages his medications. RN attempts to contacts [facility] without success. EMS did not provide medication list.   RN contacts [address] [place] [address] for pharmacy consult. Dana Ontiveros states she will contact someone from pharmacy to help attain medication list/review meds.          Sony Solis RN  09/19/20 [id number]

## 2020-09-19 NOTE — PROGRESS NOTES
Transitions of Care Pharmacy Service   Medication Review    The patient's list of current home medications has been reviewed and updated. Source(s) of information: Patient medication list from [address] [place] [address] ([phone])    Please feel free to call with any questions about this encounter. Thank you. Blaise Nguyen, [facility]  Transitions of Care Pharmacy Service  Phone:  [phone]  Fax: [phone]    Prior to Admission medications    Medication Sig Start Date End Date Taking? Authorizing Provider   bethanechol (URECHOLINE) 25 MG tablet Take 25 mg by mouth 3 times daily   Yes Historical Provider, MD   bisacodyl (DULCOLAX) 5 MG EC tablet Take 5 mg by mouth every 12 hours as needed for Constipation   Yes Historical Provider, MD   vitamin D (ERGOCALCIFEROL) 1.25 MG (44697 UT) CAPS capsule Take 50,000 Units by mouth every 30 days Takes on the first Monday of every month   Yes Historical Provider, MD   dutasteride (AVODART) 0.5 MG capsule Take 0.5 mg by mouth daily   Yes Historical Provider, MD   ferrous sulfate (FE TABS 325) 325 (65 Fe) MG EC tablet Take 325 mg by mouth 3 times daily (with meals)   Yes Historical Provider, MD   alendronate (FOSAMAX) 70 MG tablet Take 70 mg by mouth every 7 days Takes on Saturdays   Yes Historical Provider, MD   escitalopram (LEXAPRO) 20 MG tablet Take 20 mg by mouth daily   Yes Historical Provider, MD   lidocaine 4 % external patch Place 1 patch onto the skin daily 12 hours on, 12 hours off. Yes Historical Provider, MD   magnesium oxide (MAG-OX) 400 MG tablet Take 400 mg by mouth daily   Yes Historical Provider, MD   mirtazapine (REMERON) 15 MG tablet Take 15 mg by mouth nightly   Yes Historical Provider, MD   gabapentin (NEURONTIN) 600 MG tablet Take 600 mg by mouth 3 times daily. Yes Historical Provider, MD   HYDROcodone-acetaminophen (NORCO) 7.5-325 MG per tablet Take 1 tablet by mouth every 6 hours as needed for Pain.    Yes Historical Provider, MD   pantoprazole (PROTONIX) 40 MG tablet Take 40 mg by mouth 2 times daily   Yes Historical Provider, MD   senna-docusate (Hannah Reels) 8.6-50 MG per tablet Take 2 tablets by mouth daily   Yes Historical Provider, MD   tamsulosin (FLOMAX) 0.4 MG capsule Take 0.4 mg by mouth daily   Yes Historical Provider, MD

## 2020-09-19 NOTE — ED NOTES
Bed: 25  Expected date:   Expected time:   Means of arrival:   Comments:  [id number], RN  09/19/20 8837

## 2020-09-19 NOTE — H&P
resident, he report that he did feel dizzy and lightheaded along with palpitations, nausea and generalized weakness consequently brought to ER by EMS, and ER was found to be in A. fib with RVR with elevation in BNP and initially hypotensive with elevation of white cell count and thrombocytosis. Patient heart rate responded to 1 dose of Cardizem bolus of 10 mg. Patient also received 1 bolus of fluids. Chest x-ray showed bilateral pleural effusion more to the right. It is not clear if patient was actually taking Eliquis at the nursing facility. During my encounter with the patient he denied any chest pain, sob, nausea, vomiting, fever or chills. His dizziness and lightheadedness totally resolved after his heart rate came down. Patient does not have a clear source of sepsis. His CT chest and abdomen are not impressive except for liver cirrhosis with present ascites along with some stomach wall thickening concerning for gastritis. Patient is admitted for further management    Past Medical History:     Past Medical History:   Diagnosis Date    History of [facility]ism ([facility] [address]     Hypertension         Past Surgical History:     Past Surgical History:   Procedure Laterality Date    HERNIA REPAIR      HYDROCELE EXCISION          Medications Prior to Admission:     Prior to Admission medications    Medication Sig Start Date End Date Taking?  Authorizing Provider   bethanechol (URECHOLINE) 25 MG tablet Take 25 mg by mouth 3 times daily   Yes Historical Provider, MD   bisacodyl (DULCOLAX) 5 MG EC tablet Take 5 mg by mouth every 12 hours as needed for Constipation   Yes Historical Provider, MD   vitamin D (ERGOCALCIFEROL) 1.25 MG (29873 UT) CAPS capsule Take 50,000 Units by mouth every 30 days Takes on the first Monday of every month   Yes Historical Provider, MD   dutasteride (AVODART) 0.5 MG capsule Take 0.5 mg by mouth daily   Yes Historical Provider, MD   ferrous sulfate (FE TABS 325) 325 (65 Fe) MG EC tablet Take 325 mg by mouth 3 times daily (with meals)   Yes Historical Provider, MD   alendronate (FOSAMAX) 70 MG tablet Take 70 mg by mouth every 7 days Takes on Saturdays   Yes Historical Provider, MD   escitalopram (LEXAPRO) 20 MG tablet Take 20 mg by mouth daily   Yes Historical Provider, MD   lidocaine 4 % external patch Place 1 patch onto the skin daily 12 hours on, 12 hours off. Yes Historical Provider, MD   magnesium oxide (MAG-OX) 400 MG tablet Take 400 mg by mouth daily   Yes Historical Provider, MD   mirtazapine (REMERON) 15 MG tablet Take 15 mg by mouth nightly   Yes Historical Provider, MD   gabapentin (NEURONTIN) 600 MG tablet Take 600 mg by mouth 3 times daily. Yes Historical Provider, MD   HYDROcodone-acetaminophen (NORCO) 7.5-325 MG per tablet Take 1 tablet by mouth every 6 hours as needed for Pain. Yes Historical Provider, MD   pantoprazole (PROTONIX) 40 MG tablet Take 40 mg by mouth 2 times daily   Yes Historical Provider, MD   senna-docusate (Dotty Ewings) 8.6-50 MG per tablet Take 2 tablets by mouth daily   Yes Historical Provider, MD   tamsulosin (FLOMAX) 0.4 MG capsule Take 0.4 mg by mouth daily   Yes Historical Provider, MD        Allergies:     Patient has no known allergies. Social History:     Tobacco:    reports that he has been smoking. He has been smoking about 0.50 packs per day. He has never used smokeless tobacco.  Alcohol:      reports current alcohol use. Drug Use:  reports no history of drug use. Family History:     History reviewed. No pertinent family history. Review of Systems:     Positive and Negative as described in HPI. Review of Systems   Constitutional: Positive for activity change. Negative for chills, diaphoresis and fever. HENT: Negative for congestion and hearing loss. Respiratory: Negative for cough, shortness of breath, wheezing and stridor. Cardiovascular: Positive for palpitations. Negative for chest pain and leg swelling.    Gastrointestinal: Positive for diarrhea (Per the patient report more like stool incontinence). Negative for abdominal pain, blood in stool, constipation, nausea and vomiting. Genitourinary: Negative for dysuria and frequency. Musculoskeletal: Negative for myalgias. Skin: Negative for rash. Neurological: Positive for dizziness and light-headedness. Negative for seizures and headaches. Psychiatric/Behavioral: The patient is not nervous/anxious. Physical Exam:   /70   Pulse 95   Temp 96 °F (35.6 °C) (Oral)   Resp 16   Ht 5' 10\" (1.778 m)   Wt 127 lb (57.6 kg)   SpO2 98%   BMI 18.22 kg/m²   Temp (24hrs), Av °F (35.6 °C), Min:96 °F (35.6 °C), Max:96 °F (35.6 °C)    No results for input(s): POCGLU in the last 72 hours. Intake/Output Summary (Last 24 hours) at 2020 1659  Last data filed at 2020 1229  Gross per 24 hour   Intake --   Output 400 ml   Net -400 ml       Physical Exam  Vitals signs and nursing note reviewed. Constitutional:       General: He is not in acute distress. Appearance: He is well-developed. He is not diaphoretic. Comments: Underweight and malnourished, poor personal hygiene   HENT:      Head: Normocephalic and atraumatic. Right Ear: Hearing normal.      Left Ear: Hearing normal.      Nose: Nose normal. No rhinorrhea. Eyes:      General: Lids are normal.      Extraocular Movements:      Right eye: Normal extraocular motion. Left eye: Normal extraocular motion. Conjunctiva/sclera: Conjunctivae normal.      Right eye: Right conjunctiva is not injected. Left eye: Left conjunctiva is not injected. Pupils: Pupils are equal, round, and reactive to light. Pupils are equal.      Right eye: Pupil is reactive. Left eye: Pupil is reactive. Neck:      Musculoskeletal: Neck supple. Thyroid: No thyromegaly. Vascular: No carotid bruit. Trachea: Trachea and phonation normal. No tracheal deviation.    Cardiovascular:      Rate and Rhythm: Normal rate. Rhythm irregular. Pulses: Normal pulses. Heart sounds: Normal heart sounds. No murmur. Pulmonary:      Effort: Pulmonary effort is normal. No respiratory distress. Breath sounds: No stridor. Examination of the right-lower field reveals decreased breath sounds. Decreased breath sounds present. Abdominal:      General: Bowel sounds are normal. There is no distension. Palpations: Abdomen is soft. There is no mass. Tenderness: There is no abdominal tenderness. There is no guarding. Musculoskeletal:         General: No tenderness. Right lower leg: Edema present. Comments: Left above-knee amputation   Skin:     General: Skin is warm and dry. Findings: No erythema, lesion or rash. Neurological:      Mental Status: He is alert and oriented to person, place, and time. He is not disoriented. Cranial Nerves: No cranial nerve deficit. Psychiatric:         Speech: Speech normal.         Behavior: Behavior normal. Behavior is cooperative.          Investigations:      Laboratory Testing:  Recent Results (from the past 24 hour(s))   Basic Metabolic Panel    Collection Time: 09/19/20 11:46 AM   Result Value Ref Range    Glucose 82 70 - 99 mg/dL    BUN 17 8 - 23 mg/dL    CREATININE 0.58 (L) 0.70 - 1.20 mg/dL    Bun/Cre Ratio 29 (H) 9 - 20    Calcium 7.3 (L) 8.6 - 10.4 mg/dL    Sodium 132 (L) 135 - 144 mmol/L    Potassium 3.8 3.7 - 5.3 mmol/L    Chloride 100 98 - 107 mmol/L    CO2 20 20 - 31 mmol/L    Anion Gap 12 9 - 17 mmol/L    GFR Non-African American >60 >60 mL/min    GFR African American >60 >60 mL/min    GFR Comment          GFR Staging NOT REPORTED    Brain Natriuretic Peptide    Collection Time: 09/19/20 11:46 AM   Result Value Ref Range    Pro- (H) <300 pg/mL    BNP Interpretation Pro-BNP Reference Range:    CBC Auto Differential    Collection Time: 09/19/20 11:46 AM   Result Value Ref Range    WBC 17.1 (H) 3.5 - 11.3 k/uL    RBC 3.49 (L) 4.21 - 5.77 m/uL    Hemoglobin 7.6 (L) 13.0 - 17.0 g/dL    Hematocrit 26.7 (L) 40.7 - 50.3 %    MCV 76.5 (L) 82.6 - 102.9 fL    MCH 21.8 (L) 25.2 - 33.5 pg    MCHC 28.5 28.4 - 34.8 g/dL    RDW 18.0 (H) 11.8 - 14.4 %    Platelets 834 (H) 688 - 453 k/uL    MPV 10.0 8.1 - 13.5 fL    NRBC Automated 0.0 0.0 per 100 WBC    Differential Type NOT REPORTED     WBC Morphology NOT REPORTED     RBC Morphology ANISOCYTOSIS PRESENT     Platelet Estimate NOT REPORTED     Seg Neutrophils 84 (H) 36 - 65 %    Lymphocytes 8 (L) 24 - 43 %    Monocytes 5 3 - 12 %    Eosinophils % 0 (L) 1 - 4 %    Basophils 0 0 - 2 %    Immature Granulocytes 2 (H) 0 %    Segs Absolute 14.37 (H) 1.50 - 8.10 k/uL    Absolute Lymph # 1.43 1.10 - 3.70 k/uL    Absolute Mono # 0.83 0.10 - 1.20 k/uL    Absolute Eos # <0.03 0.00 - 0.44 k/uL    Basophils Absolute <0.03 0.00 - 0.20 k/uL    Absolute Immature Granulocyte 0.40 (H) 0.00 - 0.30 k/uL   Magnesium    Collection Time: 09/19/20 11:46 AM   Result Value Ref Range    Magnesium 1.8 1.6 - 2.6 mg/dL   APTT    Collection Time: 09/19/20 11:46 AM   Result Value Ref Range    PTT 34.2 (H) 23.9 - 33.8 sec   Protime-INR    Collection Time: 09/19/20 11:46 AM   Result Value Ref Range    Protime 13.7 11.5 - 14.2 sec    INR 1.1    Lactic Acid    Collection Time: 09/19/20 11:46 AM   Result Value Ref Range    Lactic Acid 2.7 (H) 0.5 - 2.2 mmol/L   Troponin    Collection Time: 09/19/20 11:46 AM   Result Value Ref Range    Troponin, High Sensitivity 21 0 - 22 ng/L    Troponin T NOT REPORTED <0.03 ng/mL    Troponin Interp NOT REPORTED    Sedimentation Rate    Collection Time: 09/19/20 11:46 AM   Result Value Ref Range    Sed Rate 12 0 - 20 mm   Urinalysis    Collection Time: 09/19/20 12:20 PM   Result Value Ref Range    Color, UA YELLOW YELLOW    Turbidity UA CLEAR CLEAR    Glucose, Ur NEGATIVE NEGATIVE    Bilirubin Urine NEGATIVE NEGATIVE    Ketones, Urine 1+ (A) NEGATIVE    Specific Gravity, UA 1.025 1.005 - 1.030    Urine Hgb ([facility] [address] 9/19/2020 Yes    Microcytic anemia 9/19/2020 Yes    Thrombocytosis ([facility] [address] 9/19/2020 Yes    SIRS (systemic inflammatory response syndrome) ([facility] [address] 9/19/2020 Yes    Elevated brain natriuretic peptide (BNP) level 9/19/2020 Yes    Severe malnutrition ([facility] [address] 9/19/2020 Yes    Hyponatremia 9/19/2020 Yes    History of alcoholism ([facility] [address] 9/19/2020 Yes    Chronic atrial fibrillation 9/19/2020 Yes    Leukocytosis 9/19/2020 Yes    Sarcopenia 9/19/2020 Yes          Plan:     Patient status inpatient in the  Progressive Unit/Step down    Chronic A. fib presented with A. fib with RVR: Rate better after bolus of Cardizem 10 mg in ER, given the blood pressure on the lower side will only resume metoprolol 12.5 and will add digoxin. We will get echocardiogram and consult cardiology. .  For now we will resume anticoagulation as his chads vascular is high    Microcytic anemia no clear baseline, last hemoglobin in the system 2 years ago was around 9  we will order fecal occult blood and monitor hemoglobin and transfuse if less than 7..  Order FOBT, noted patient on iron supplement as home medication, will resume    Elevated BNP/bilateral pleural effusion: No known history of heart failure, echo is pending. Liver cirrhosis with ascites on CT abdomen: History of alcoholism, no formal diagnosis in the past, INR is 1.1. Sirs criteria: Patient had leukocytosis and elevated heart rate initially along with thrombocytosis: No clear source of infection, given ascites on CT abdomen will empirically cover with Rocephin. Severe malnutrition/cytopenia: Needed addition consult start oral supplementation    Mild hyponatremia: Continue to monitor daily.     PT/OT    Consultations:   IP CONSULT TO HOSPITALIST  IP CONSULT TO CARDIOLOGY     Patient is admitted as inpatient status because of co-morbidities listed above, severity of signs and symptoms as outlined, requirement for current medical therapies and most importantly because of direct risk to patient if care not provided in a hospital setting. Expected length of stay > 48 hours. Erica Wade MD  9/19/2020  4:59 PM    Copy sent to Dr. Shabazz primary care provider on file.

## 2020-09-20 PROBLEM — C43.70 MALIGNANT MELANOMA OF LOWER EXTREMITY, INCLUDING HIP (HCC): Status: ACTIVE | Noted: 2020-09-20

## 2020-09-20 LAB
ALBUMIN SERPL-MCNC: 1.4 G/DL (ref 3.5–5.2)
ALBUMIN/GLOBULIN RATIO: ABNORMAL (ref 1–2.5)
ALP BLD-CCNC: 149 U/L (ref 40–129)
ALT SERPL-CCNC: 10 U/L (ref 5–41)
ANION GAP SERPL CALCULATED.3IONS-SCNC: 9 MMOL/L (ref 9–17)
AST SERPL-CCNC: 12 U/L
BILIRUB SERPL-MCNC: <0.1 MG/DL (ref 0.3–1.2)
BUN BLDV-MCNC: 14 MG/DL (ref 8–23)
BUN/CREAT BLD: 28 (ref 9–20)
CALCIUM IONIZED: 1.12 MMOL/L (ref 1.13–1.33)
CALCIUM SERPL-MCNC: 6.5 MG/DL (ref 8.6–10.4)
CHLORIDE BLD-SCNC: 106 MMOL/L (ref 98–107)
CO2: 18 MMOL/L (ref 20–31)
CORTISOL COLLECTION INFO: NORMAL
CORTISOL: 15.2 UG/DL (ref 2.7–18.4)
CREAT SERPL-MCNC: 0.5 MG/DL (ref 0.7–1.2)
DIGOXIN DATE LAST DOSE: NORMAL
DIGOXIN DOSE AMOUNT: NORMAL
DIGOXIN DOSE TIME: NORMAL
DIGOXIN LEVEL: 0.6 NG/ML (ref 0.5–2)
EKG ATRIAL RATE: 113 BPM
EKG P AXIS: 75 DEGREES
EKG P-R INTERVAL: 140 MS
EKG Q-T INTERVAL: 312 MS
EKG QRS DURATION: 66 MS
EKG QTC CALCULATION (BAZETT): 464 MS
EKG R AXIS: 84 DEGREES
EKG T AXIS: 72 DEGREES
EKG VENTRICULAR RATE: 133 BPM
FERRITIN: 79 UG/L (ref 30–400)
GFR AFRICAN AMERICAN: >60 ML/MIN
GFR NON-AFRICAN AMERICAN: >60 ML/MIN
GFR SERPL CREATININE-BSD FRML MDRD: ABNORMAL ML/MIN/{1.73_M2}
GFR SERPL CREATININE-BSD FRML MDRD: ABNORMAL ML/MIN/{1.73_M2}
GLUCOSE BLD-MCNC: 98 MG/DL (ref 70–99)
HCT VFR BLD CALC: 24.9 % (ref 40.7–50.3)
HCT VFR BLD CALC: 29.7 % (ref 40.7–50.3)
HEMOGLOBIN: 7 G/DL (ref 13–17)
HEMOGLOBIN: 8.9 G/DL (ref 13–17)
INR BLD: 1.2
LACTIC ACID: 3.8 MMOL/L (ref 0.5–2.2)
MAGNESIUM: 1.7 MG/DL (ref 1.6–2.6)
MCH RBC QN AUTO: 22.1 PG (ref 25.2–33.5)
MCHC RBC AUTO-ENTMCNC: 28.1 G/DL (ref 28.4–34.8)
MCV RBC AUTO: 78.5 FL (ref 82.6–102.9)
NRBC AUTOMATED: 0 PER 100 WBC
PDW BLD-RTO: 18.2 % (ref 11.8–14.4)
PHOSPHORUS: 1.9 MG/DL (ref 2.5–4.5)
PLATELET # BLD: 359 K/UL (ref 138–453)
PMV BLD AUTO: 9.9 FL (ref 8.1–13.5)
POTASSIUM SERPL-SCNC: 3.9 MMOL/L (ref 3.7–5.3)
PROCALCITONIN: 10.59 NG/ML
PROTHROMBIN TIME: 14.6 SEC (ref 11.5–14.2)
RBC # BLD: 3.17 M/UL (ref 4.21–5.77)
SODIUM BLD-SCNC: 133 MMOL/L (ref 135–144)
TOTAL PROTEIN: 4 G/DL (ref 6.4–8.3)
WBC # BLD: 12 K/UL (ref 3.5–11.3)

## 2020-09-20 PROCEDURE — 85027 COMPLETE CBC AUTOMATED: CPT

## 2020-09-20 PROCEDURE — 83516 IMMUNOASSAY NONANTIBODY: CPT

## 2020-09-20 PROCEDURE — 2580000003 HC RX 258: Performed by: NURSE PRACTITIONER

## 2020-09-20 PROCEDURE — 82728 ASSAY OF FERRITIN: CPT

## 2020-09-20 PROCEDURE — 86645 CMV ANTIBODY IGM: CPT

## 2020-09-20 PROCEDURE — 86920 COMPATIBILITY TEST SPIN: CPT

## 2020-09-20 PROCEDURE — 6360000002 HC RX W HCPCS: Performed by: NURSE PRACTITIONER

## 2020-09-20 PROCEDURE — 86663 EPSTEIN-BARR ANTIBODY: CPT

## 2020-09-20 PROCEDURE — 80074 ACUTE HEPATITIS PANEL: CPT

## 2020-09-20 PROCEDURE — 97530 THERAPEUTIC ACTIVITIES: CPT

## 2020-09-20 PROCEDURE — 2580000003 HC RX 258: Performed by: FAMILY MEDICINE

## 2020-09-20 PROCEDURE — 2500000003 HC RX 250 WO HCPCS: Performed by: FAMILY MEDICINE

## 2020-09-20 PROCEDURE — 80162 ASSAY OF DIGOXIN TOTAL: CPT

## 2020-09-20 PROCEDURE — 6370000000 HC RX 637 (ALT 250 FOR IP): Performed by: FAMILY MEDICINE

## 2020-09-20 PROCEDURE — 36430 TRANSFUSION BLD/BLD COMPNT: CPT

## 2020-09-20 PROCEDURE — 2060000000 HC ICU INTERMEDIATE R&B

## 2020-09-20 PROCEDURE — 83550 IRON BINDING TEST: CPT

## 2020-09-20 PROCEDURE — 86665 EPSTEIN-BARR CAPSID VCA: CPT

## 2020-09-20 PROCEDURE — 80053 COMPREHEN METABOLIC PANEL: CPT

## 2020-09-20 PROCEDURE — 86901 BLOOD TYPING SEROLOGIC RH(D): CPT

## 2020-09-20 PROCEDURE — 82746 ASSAY OF FOLIC ACID SERUM: CPT

## 2020-09-20 PROCEDURE — 86255 FLUORESCENT ANTIBODY SCREEN: CPT

## 2020-09-20 PROCEDURE — 51702 INSERT TEMP BLADDER CATH: CPT

## 2020-09-20 PROCEDURE — 2500000003 HC RX 250 WO HCPCS: Performed by: INTERNAL MEDICINE

## 2020-09-20 PROCEDURE — 83540 ASSAY OF IRON: CPT

## 2020-09-20 PROCEDURE — 85018 HEMOGLOBIN: CPT

## 2020-09-20 PROCEDURE — 86664 EPSTEIN-BARR NUCLEAR ANTIGEN: CPT

## 2020-09-20 PROCEDURE — 97110 THERAPEUTIC EXERCISES: CPT

## 2020-09-20 PROCEDURE — 82103 ALPHA-1-ANTITRYPSIN TOTAL: CPT

## 2020-09-20 PROCEDURE — 83735 ASSAY OF MAGNESIUM: CPT

## 2020-09-20 PROCEDURE — 99222 1ST HOSP IP/OBS MODERATE 55: CPT | Performed by: INTERNAL MEDICINE

## 2020-09-20 PROCEDURE — 83605 ASSAY OF LACTIC ACID: CPT

## 2020-09-20 PROCEDURE — 97162 PT EVAL MOD COMPLEX 30 MIN: CPT

## 2020-09-20 PROCEDURE — 36415 COLL VENOUS BLD VENIPUNCTURE: CPT

## 2020-09-20 PROCEDURE — 6360000002 HC RX W HCPCS: Performed by: FAMILY MEDICINE

## 2020-09-20 PROCEDURE — P9016 RBC LEUKOCYTES REDUCED: HCPCS

## 2020-09-20 PROCEDURE — 2580000003 HC RX 258: Performed by: INTERNAL MEDICINE

## 2020-09-20 PROCEDURE — 82390 ASSAY OF CERULOPLASMIN: CPT

## 2020-09-20 PROCEDURE — 82105 ALPHA-FETOPROTEIN SERUM: CPT

## 2020-09-20 PROCEDURE — 6360000002 HC RX W HCPCS: Performed by: INTERNAL MEDICINE

## 2020-09-20 PROCEDURE — 86038 ANTINUCLEAR ANTIBODIES: CPT

## 2020-09-20 PROCEDURE — 82330 ASSAY OF CALCIUM: CPT

## 2020-09-20 PROCEDURE — 85014 HEMATOCRIT: CPT

## 2020-09-20 PROCEDURE — 86850 RBC ANTIBODY SCREEN: CPT

## 2020-09-20 PROCEDURE — 99233 SBSQ HOSP IP/OBS HIGH 50: CPT | Performed by: FAMILY MEDICINE

## 2020-09-20 PROCEDURE — 85610 PROTHROMBIN TIME: CPT

## 2020-09-20 PROCEDURE — 86644 CMV ANTIBODY: CPT

## 2020-09-20 PROCEDURE — 86376 MICROSOMAL ANTIBODY EACH: CPT

## 2020-09-20 PROCEDURE — 86256 FLUORESCENT ANTIBODY TITER: CPT

## 2020-09-20 PROCEDURE — 82607 VITAMIN B-12: CPT

## 2020-09-20 PROCEDURE — 86900 BLOOD TYPING SEROLOGIC ABO: CPT

## 2020-09-20 PROCEDURE — 84100 ASSAY OF PHOSPHORUS: CPT

## 2020-09-20 PROCEDURE — 82784 ASSAY IGA/IGD/IGG/IGM EACH: CPT

## 2020-09-20 RX ORDER — FENTANYL CITRATE 50 UG/ML
50 INJECTION, SOLUTION INTRAMUSCULAR; INTRAVENOUS
Status: DISCONTINUED | OUTPATIENT
Start: 2020-09-20 | End: 2020-09-21

## 2020-09-20 RX ORDER — MIDODRINE HYDROCHLORIDE 10 MG/1
10 TABLET ORAL 3 TIMES DAILY
Status: DISCONTINUED | OUTPATIENT
Start: 2020-09-20 | End: 2020-09-21

## 2020-09-20 RX ORDER — OXYCODONE HYDROCHLORIDE 5 MG/1
5 TABLET ORAL EVERY 4 HOURS PRN
Status: DISCONTINUED | OUTPATIENT
Start: 2020-09-20 | End: 2020-09-21

## 2020-09-20 RX ORDER — FUROSEMIDE 10 MG/ML
20 INJECTION INTRAMUSCULAR; INTRAVENOUS DAILY
Status: DISCONTINUED | OUTPATIENT
Start: 2020-09-20 | End: 2020-09-21

## 2020-09-20 RX ORDER — MORPHINE SULFATE 2 MG/ML
2 INJECTION, SOLUTION INTRAMUSCULAR; INTRAVENOUS EVERY 4 HOURS PRN
Status: DISCONTINUED | OUTPATIENT
Start: 2020-09-20 | End: 2020-09-20

## 2020-09-20 RX ORDER — 0.9 % SODIUM CHLORIDE 0.9 %
250 INTRAVENOUS SOLUTION INTRAVENOUS ONCE
Status: COMPLETED | OUTPATIENT
Start: 2020-09-20 | End: 2020-09-20

## 2020-09-20 RX ORDER — OXYCODONE HYDROCHLORIDE 5 MG/1
10 TABLET ORAL EVERY 4 HOURS PRN
Status: DISCONTINUED | OUTPATIENT
Start: 2020-09-20 | End: 2020-09-21

## 2020-09-20 RX ADMIN — CEFTRIAXONE SODIUM 1 G: 1 INJECTION, POWDER, FOR SOLUTION INTRAMUSCULAR; INTRAVENOUS at 17:40

## 2020-09-20 RX ADMIN — FERROUS SULFATE TAB EC 325 MG (65 MG FE EQUIVALENT) 325 MG: 325 (65 FE) TABLET DELAYED RESPONSE at 09:36

## 2020-09-20 RX ADMIN — PANTOPRAZOLE SODIUM 40 MG: 40 TABLET, DELAYED RELEASE ORAL at 17:40

## 2020-09-20 RX ADMIN — MIDODRINE HYDROCHLORIDE 10 MG: 10 TABLET ORAL at 20:10

## 2020-09-20 RX ADMIN — FUROSEMIDE 20 MG: 10 INJECTION, SOLUTION INTRAVENOUS at 15:49

## 2020-09-20 RX ADMIN — FERROUS SULFATE TAB EC 325 MG (65 MG FE EQUIVALENT) 325 MG: 325 (65 FE) TABLET DELAYED RESPONSE at 12:13

## 2020-09-20 RX ADMIN — MIDODRINE HYDROCHLORIDE 10 MG: 10 TABLET ORAL at 09:30

## 2020-09-20 RX ADMIN — MIRTAZAPINE 15 MG: 15 TABLET, FILM COATED ORAL at 20:10

## 2020-09-20 RX ADMIN — DIGOXIN 125 MCG: 125 TABLET ORAL at 09:30

## 2020-09-20 RX ADMIN — MAGNESIUM GLUCONATE 500 MG ORAL TABLET 400 MG: 500 TABLET ORAL at 09:30

## 2020-09-20 RX ADMIN — DILTIAZEM HYDROCHLORIDE 15 MG/HR: 5 INJECTION INTRAVENOUS at 23:28

## 2020-09-20 RX ADMIN — GABAPENTIN 300 MG: 300 CAPSULE ORAL at 20:10

## 2020-09-20 RX ADMIN — APIXABAN 5 MG: 5 TABLET, FILM COATED ORAL at 09:30

## 2020-09-20 RX ADMIN — GABAPENTIN 300 MG: 300 CAPSULE ORAL at 09:30

## 2020-09-20 RX ADMIN — GABAPENTIN 300 MG: 300 CAPSULE ORAL at 15:00

## 2020-09-20 RX ADMIN — MIDODRINE HYDROCHLORIDE 10 MG: 10 TABLET ORAL at 15:09

## 2020-09-20 RX ADMIN — ESCITALOPRAM OXALATE 20 MG: 10 TABLET ORAL at 09:30

## 2020-09-20 RX ADMIN — PANTOPRAZOLE SODIUM 40 MG: 40 TABLET, DELAYED RELEASE ORAL at 06:02

## 2020-09-20 RX ADMIN — VANCOMYCIN HYDROCHLORIDE 1000 MG: 1 INJECTION, POWDER, LYOPHILIZED, FOR SOLUTION INTRAVENOUS at 23:50

## 2020-09-20 RX ADMIN — MORPHINE SULFATE 2 MG: 2 INJECTION, SOLUTION INTRAMUSCULAR; INTRAVENOUS at 03:14

## 2020-09-20 RX ADMIN — SODIUM PHOSPHATE, MONOBASIC, MONOHYDRATE 15 MMOL: 276; 142 INJECTION, SOLUTION INTRAVENOUS at 20:10

## 2020-09-20 RX ADMIN — FERROUS SULFATE TAB EC 325 MG (65 MG FE EQUIVALENT) 325 MG: 325 (65 FE) TABLET DELAYED RESPONSE at 17:40

## 2020-09-20 RX ADMIN — ACETAMINOPHEN 650 MG: 325 TABLET ORAL at 09:31

## 2020-09-20 RX ADMIN — SODIUM CHLORIDE 250 ML: 9 INJECTION, SOLUTION INTRAVENOUS at 13:04

## 2020-09-20 RX ADMIN — FENTANYL CITRATE 50 MCG: 50 INJECTION, SOLUTION INTRAMUSCULAR; INTRAVENOUS at 11:37

## 2020-09-20 RX ADMIN — MORPHINE SULFATE 2 MG: 2 INJECTION, SOLUTION INTRAMUSCULAR; INTRAVENOUS at 07:45

## 2020-09-20 RX ADMIN — APIXABAN 5 MG: 5 TABLET, FILM COATED ORAL at 20:10

## 2020-09-20 RX ADMIN — VANCOMYCIN HYDROCHLORIDE 1500 MG: 1.5 INJECTION, POWDER, LYOPHILIZED, FOR SOLUTION INTRAVENOUS at 11:12

## 2020-09-20 ASSESSMENT — ENCOUNTER SYMPTOMS
ABDOMINAL PAIN: 0
SHORTNESS OF BREATH: 0
CONSTIPATION: 0
COUGH: 0
NAUSEA: 0
BLOOD IN STOOL: 0
WHEEZING: 0
DIARRHEA: 1
VOMITING: 0
CHEST TIGHTNESS: 0

## 2020-09-20 ASSESSMENT — PAIN SCALES - GENERAL
PAINLEVEL_OUTOF10: 8
PAINLEVEL_OUTOF10: 10
PAINLEVEL_OUTOF10: 10
PAINLEVEL_OUTOF10: 7

## 2020-09-20 NOTE — CARE COORDINATION
Case Management Initial Discharge Plan  [facility] Caitlin Castellanos,         Readmission Risk              Risk of Unplanned Readmission:        19             Met with:patient to discuss discharge plans. Information verified: address, contacts, phone number, , insurance Yes  PCP: Edwar Cohen NP   Date of last visit: few weeks     Insurance Provider: Javid Whitney , [address] [place] [address] dual     Discharge Planning  Current Residence:  Assisted living [address] at [address] [place] [address] ( [phone])     Living Arrangements:  Other (Comment)       Home is AL at [facility]:  Other (Comment) HHA and RN at [address] [facility] PTA:  Home care   Agency: [facility]       Patient able to perform ADL's:Assisted  DME in home:  Motorized wheelchair, walker, shower chair   DME used to aid ambulation prior to admission:   Wheelchair   DME used during admission:  tbd     Potential Assistance Needed:  [employer], [address] [place], [place] [address]vd: [facility]    Potential Assistance Purchasing Medications:  No  Does patient want to participate in local refill/ meds to beds program?  Not Assessed    Patient agreeable to home care: Yes  Freedom of choice provided:  yes    The Plan for Transition of Care is related to the following treatment goals: skilled RN therapy     The Patient and/or patient representative   was provided with a choice of provider and agrees   with the discharge plan. [x] Yes [] No    Freedom of choice list was provided with basic dialogue that supports the patient's individualized plan of care/goals, treatment preferences and shares the quality data associated with the providers. [x] Yes [] No    Type of Home Care Services:  None(Pt not sure)  Patient expects to be discharged to:   [address] [place] [address]    Prior SNF/Rehab Placement and Facility: none   Agreeable to SNF/Rehab: unsure   [place] of choice provided: n/a   Evaluation: yes    Expected Discharge date:  20  Follow Up Appointment: Best Day/ Time:   any       Transportation provider: per [employer] arrangements needed for discharge: Yes    Discharge Plan:   Met with patient to follow up on plan of care. He lives at [facility] assisted living in room 15. He has RN pass his medications and states he has HHA assistance if needed. He states he pivots from bed to wheelchair. He is Left AKA . He also states he does his own ADL's such as bathing and cleaning. DORA Carter stated patient had stool caked onto his penis and throughout his perineal area. Patient has had home care in past there but unsure of name. Call to AL at [facility] and spoke to the RN. She stated that patient has medication passing, HHA as needed. He often refuses any HHA assistance. He has had home care thru [facility] but then started to refuse therapy. He is not current with anyone at this time. He had no pcp listed and RN confirmed that he sees Ramy Ford NP and updated registration. Patient is emaciated, states eats when he wants. He also says he has been weaker than normal.  Will need to follow for potential home care or snf. ID is following for sepsis. Will also need to follow their recommendations.        Electronically signed by Monica Mathews RN on 9/20/20 at 2:12 PM EDT

## 2020-09-20 NOTE — PROGRESS NOTES
Direct oral anticoagulant (DOAC) - Initial Pharmacy Review  PLAN    No obvious intervention needed. Alcides Dutta PHILOMENA  GREG  2020  8:38 PM    ASSESSMENT   Patient chart reviewed for indication and appropriateness of dose and therapy of Apixaban.:  Indication: AFib. PATIENT INFORMATION   Body mass index is 18.22 kg/m². Wt Readings from Last 1 Encounters:   20 127 lb (57.6 kg)     Some sources recommend that DOACs be avoided in weight < 50 or > 120 kg, or BMI > 40 whenever possible; however. some smaller studies suggest that DOACs may be safe and efficacious in this population. Recent Labs     20  1146   CREATININE 0.58*     Recent Labs     20  1146   HGB 7.6*   HCT 26.7*   *     Recent Labs     20  1146   INR 1.1         Weight  kg ? BMI Less than   40 kg/m2 Calculated CrCl  Using ABW (mL/min) Other anticoagulants on MAR Drug interactions  (since admission) reviewed   yes    Wt Readings from Last 1 Encounters:   20 127 lb (57.6 kg)    yes    Body mass index is 18.22 kg/m². Apixaban for Afib is reviewed separately        (140-age)*ABW    72 * sCr    X 0.85 for females No concurrent anticoagulants ordered. diltiazem . Pharmacologic effects and plasma concentrations of apixaban may be increased by moderate CY inhibitors (e.g. dilTIAZem).                   -----------------------------------------------------------------------------------------------------------------    CRCL Calculation for DOACs  (use ABW)    CrCl male:  (140-Age) * ABW           For female:  (140-Age) * ABW * 0.85                       72  *  sCr                                              72 * sCr           Apixaban (Eliquis)  Indication Dose (Oral) Renal Adjustment (CrCl calculated on ABW) Select Drug Interactions   NVAF 5mg BID 2.5mg BID  IF patient has TWO of the following:  Age>80, Weight <60 kg, SCr > 1.5  (If dialysis, but not meeting above criteria, keep 5 mg BID*)   Strong P-gp/CY inducers (Avoid combination)  Apalutamide, CarBAMazepine, Enzalutamide, Fosphenytoin, Lumacaftor, Mitotane, PHENobarbital, Phenytoin, Primidone, RifAMPin    P-gp/CY inhibitors (Avoid use or dose adjustment)  Clarithromycin, Itraconazole, Ketoconazole (systemic), Ombitasvir, Paritaprevir, Ritonavir       DVT/PE  Treatment 10mg BID x7d, then 5mg BID No dosage adjustment necessary; however, patients with a serum creatinine >2.5 mg/dL or CrCl <25 mL/minute (as determined by Cockcroft-Gault equation) were excluded from the clinical trials*    DVT/PE  Recurrence Preventions 5mg BID (or potentially 2.5mg BID after at least 6 months of treatment)     DVT prevention PostOp Knee: 2.5mg BID x 12d  Hip: 2.5mg BID x 35d No dosage adjustment necessary; however, patients with either clinically significant renal impairment, impaired renal function, or CrCl <30 mL/minute were excluded from the respective clinical trials. For patients receiving dual strong CY and P-glycoprotein inhibitors (eg, clarithromycin, ketoconazole, itraconazole, ritonavir) and apixaban doses >2.5 mg twice daily, reduce apixaban dose by 50%   *(Vadim, 2013a;  Vadim, 2013b)    Rivaroxaban (Xarelto®)  - (CrCl calculated on ABW)  Indication Dose (Oral)  Adjustment  (CrCl calculated on ABW) Drug Interactions   NVAF 20mg daily   (with evening meal) CrCl <  50 mL/min = 15mg/day (with evening meal)   Strong P-gp/CY inducers (Avoid combination  Apalutamide, CarBAMazepine, Enzalutamide, Fosphenytoin, Lumacaftor, Mitotane, PHENobarbital, Phenytoin, Primidone, RifAMPin    P-gp/CY inhibitors (Avoid combination)  Clarithromycin, Itraconazole, Ketoconazole (systemic), Ombitasvir, Paritaprevir, Ritonavir, Debi's wort   DVT/PE Treatment 15mg BID x21d, then 20mg daily (take with food) CrCl< 15 =Avoid use    DVT/PE  Recurrence Preventions 20mg daily (or 10 mg daily  after at least 6 months of treatment) CrCl< 15 =Avoid use    DVT prevention PostOp Knee: 10mg daily x 12-14d   (up to 35 days suggested)  Hip: 10mg daily x 35 days CrCl <15 =Avoid use    CAD/PAD  (Chronic, stable) 2.5mg BID with once daily ASA 75-100mg No adjustment for > 15 mL/min. Use with caution in severe renal impairment. * Suzie ANDERSON et al. J Am Soc Nephrol 2017. doi:10.1681/ASN. [id number]  *Adeline TORRES, et al. Circulation 2018.  IHD:77.1543/MDZABMWKKNELPM  Nando Patti et al. [facility] 2019  Kranthi Leeann M, et al. Blood 0053;[id number]  Barbara Grady et al. CHEST 2018  José Jones, et al. [facility]ina Pharmacother 2019  [facility]/YUDITH et al. Circulation 2018

## 2020-09-20 NOTE — CARE COORDINATION
Discharge planning    PT recommending snf and did provide list to have him look at. If home care he Is agreeable to [facility] ( they use that at his AL )     Will have ss follow up on Monday.  HAIDER left

## 2020-09-20 NOTE — FLOWSHEET NOTE
Patient states he is ok, no major needs were expressed. Patient declined visit at this time.    leaves prayer card for possible follow up.     09/20/20 9499   Encounter Summary   Services provided to: Patient   Referral/Consult From: Mayuri   Continue Visiting   (9-20-20)   Complexity of Encounter Low   Length of Encounter 15 minutes   Spiritual Assessment Completed Yes   Routine   Type Initial   Assessment Passive   Intervention Explored feelings, thoughts, concerns   Outcome Refused/declined

## 2020-09-20 NOTE — FLOWSHEET NOTE
09/20/20 0527   Provider Notification   Reason for Communication Critical Value (comment)  (Hgb 7.0)   Provider Name [facility]   Provider Notification Advance Practice Clinician (CNS, NP, CNM, CRNA, PA)   Method of Communication Secure Message   Notification Time 5422

## 2020-09-20 NOTE — CONSULTS
Cardiology Consult           Date of Admission:  9/19/2020  Date of Consultation:  9/20/2020      PCP:  No primary care provider on file. Chief Complaint: palpitations and SOB    History of Present Illness:  Eusebia Srinivasan is a 77 y.o. male who presents with LH and palpitations. Past medical history is significant for CVA, alcoholism, hypertension, Left AKA  and cirrhosis. He has known history of atrial fibrillation on Eliquis. He was started on cardizem infusion and rate is well controlled. Also found have severe anemia and possible GI bleed and GI consulted    He denies CP. Does not remember his cardiologist.      PMH:   has a past medical history of History of alcoholism ([address] and Hypertension. PSH:   has a past surgical history that includes hernia repair and Hydrocele surgery. Allergies:  No Known Allergies     Home Meds:    Prior to Admission medications    Medication Sig Start Date End Date Taking? Authorizing Provider   bethanechol (URECHOLINE) 25 MG tablet Take 25 mg by mouth 3 times daily   Yes Historical Provider, MD   bisacodyl (DULCOLAX) 5 MG EC tablet Take 5 mg by mouth every 12 hours as needed for Constipation   Yes Historical Provider, MD   vitamin D (ERGOCALCIFEROL) 1.25 MG (09616 UT) CAPS capsule Take 50,000 Units by mouth every 30 days Takes on the first Monday of every month   Yes Historical Provider, MD   dutasteride (AVODART) 0.5 MG capsule Take 0.5 mg by mouth daily   Yes Historical Provider, MD   ferrous sulfate (FE TABS 325) 325 (65 Fe) MG EC tablet Take 325 mg by mouth 3 times daily (with meals)   Yes Historical Provider, MD   alendronate (FOSAMAX) 70 MG tablet Take 70 mg by mouth every 7 days Takes on Saturdays   Yes Historical Provider, MD   escitalopram (LEXAPRO) 20 MG tablet Take 20 mg by mouth daily   Yes Historical Provider, MD   lidocaine 4 % external patch Place 1 patch onto the skin daily 12 hours on, 12 hours off.    Yes Historical Provider, MD magnesium oxide (MAG-OX) 400 MG tablet Take 400 mg by mouth daily   Yes Historical Provider, MD   mirtazapine (REMERON) 15 MG tablet Take 15 mg by mouth nightly   Yes Historical Provider, MD   gabapentin (NEURONTIN) 600 MG tablet Take 600 mg by mouth 3 times daily. Yes Historical Provider, MD   HYDROcodone-acetaminophen (NORCO) 7.5-325 MG per tablet Take 1 tablet by mouth every 6 hours as needed for Pain.    Yes Historical Provider, MD   pantoprazole (PROTONIX) 40 MG tablet Take 40 mg by mouth 2 times daily   Yes Historical Provider, MD   senna-docusate (Yetta Price) 8.6-50 MG per tablet Take 2 tablets by mouth daily   Yes Historical Provider, MD   tamsulosin (FLOMAX) 0.4 MG capsule Take 0.4 mg by mouth daily   Yes Historical Provider, MD        Hospital Meds:    Current Facility-Administered Medications   Medication Dose Route Frequency Provider Last Rate Last Dose    0.9 % sodium chloride infusion 250 mL  250 mL Intravenous Once Ana Paula Carbajal, APRN - CNP        vancomycin 1000 mg IVPB in 250 mL D5W addavial  1,000 mg Intravenous Q12H Pedro Rangel MD        midodrine (PROAMATINE) tablet 10 mg  10 mg Oral TID Pedro Rangel MD   10 mg at 09/20/20 0930    vancomycin (VANCOCIN) 1,500 mg in dextrose 5 % 250 mL IVPB  1,500 mg Intravenous Once Pedro Rangel MD        oxyCODONE (ROXICODONE) immediate release tablet 5 mg  5 mg Oral Q4H PRN Pedro Rangel MD        Or    oxyCODONE (ROXICODONE) immediate release tablet 10 mg  10 mg Oral Q4H PRN Pedro Rangel MD        fentaNYL (SUBLIMAZE) injection 50 mcg  50 mcg Intravenous Q2H PRN Pedro Rangel MD        furosemide (LASIX) injection 20 mg  20 mg Intravenous Daily Regina Hayes MD        sodium chloride flush 0.9 % injection 10 mL  10 mL Intravenous PRN Edelmira Lino MD   10 mL at 09/19/20 1248    midodrine (PROAMATINE) tablet 10 mg  10 mg Oral Once Edelmira Lino MD        ferrous sulfate (FE TABS 325) EC tablet 325 mg  325 mg Oral TID there has been no unanticipated weight loss. There's been no change in energy level, sleep pattern, or activity level. · Eyes: No visual changes or diplopia. No scleral icterus. · ENT: No Headaches, hearing loss or vertigo. No mouth sores or sore throat. · Cardiovascular: No chest pain, dyspnea on exertion, + palpitations . No cough, hemoptysis, pleuritic pain, or phlebitis. · Respiratory: No cough or wheezing, no sputum production. No hematemesis. · Gastrointestinal: No abdominal pain, appetite loss, blood in stools. No change in bowel or bladder habits. · Genitourinary: No dysuria, trouble voiding, or hematuria. · Musculoskeletal:  No gait disturbance, weakness or joint complaints. · Integumentary: No rash or pruritis. · Neurological: No headache, diplopia, change in muscle strength, numbness or tingling. No change in gait, balance, coordination, mood, affect, memory, mentation, behavior. · Psychiatric: No anxiety, or depression. · Endocrine: No temperature intolerance. No excessive thirst, fluid intake, or urination. No tremor. · Hematologic/Lymphatic: No abnormal bruising or bleeding, blood clots or swollen lymph nodes. · Allergic/Immunologic: No nasal congestion or hives.        Physical Exam    Vital Signs: BP (!) 100/50   Pulse 124   Temp 98.2 °F (36.8 °C) (Oral)   Resp 16   Ht 5' 10\" (1.778 m)   Wt 113 lb 1.6 oz (51.3 kg)   SpO2 95%   BMI 16.23 kg/m²        Admission Weight: 127 lb (57.6 kg)     General appearance: Awake, Alert Cooperative    Head: Normocephalic, without obvious abnormality, atraumatic    Eyes: pallor    Neck: no adenopathy, no carotid bruit, no JVD, supple, symmetrical, trachea midline and thyroid: not enlarged, symmetric, no tenderness/mass/nodules    Lungs: clear to auscultation bilaterally    Heart: irregular rate and rhythm, S1, S2 normal, no murmur,     Abdomen: Soft    Extremities: left AKA     Skin: Skin color, texture, turgor normal. No rashes or lesions    Neurologic: Grossly normal        MEDICAL DECISION MAKING/TESTING        EKG:  Atrial fibrillation     CXR: bilateral pleural effusion      Labs:      CBC:   Recent Labs     09/19/20  1146 09/20/20  0318   WBC 17.1* 12.0*   HGB 7.6* 7.0*   HCT 26.7* 24.9*   MCV 76.5* 78.5*   * 359     BMP:   Recent Labs     09/19/20  1146 09/20/20  0318   * 133*   K 3.8 3.9    106   CO2 20 18*   PHOS  --  1.9*   BUN 17 14   CREATININE 0.58* 0.50*     PT/INR:   Recent Labs     09/19/20  1146 09/20/20  0318   PROTIME 13.7 14.6*   INR 1.1 1.2     APTT:   Recent Labs     09/19/20  1146   APTT 34.2*     MAG:   Recent Labs     09/19/20  1146 09/20/20  0318   MG 1.8 1.7     D Dimer: No results for input(s): DDIMER in the last 72 hours. Troponin T   Recent Labs     09/19/20  1146 09/19/20  1342   TROPONINT NOT REPORTED NOT REPORTED     ProBNP Invalid input(s): PRO-BNP          Diagnosis:  Principal Problem:    Atrial fibrillation with RVR (HCC)  Active Problems:    Elevated brain natriuretic peptide (BNP) level    History of alcoholism (HCC)    Cirrhosis of liver with ascites (HCC)    Chronic atrial fibrillation    Leukocytosis    Microcytic anemia    Thrombocytosis (HCC)    Severe malnutrition (HCC)    Sarcopenia    Hyponatremia    SIRS (systemic inflammatory response syndrome) (HCC)  Resolved Problems:    * No resolved hospital problems. *        Plan:    1. Atrial fibrillation RVR : Continue Cardizem infusion for rate control. I won't be aggressive with rate control measures as he has labile hemodynamics and sepsis. Agree with blood transfusion. Since he has severe anemia and GI bleed, it's also ok to hold Eliquis until GI evaluation is complete. 2. Congestive heart failure : Reduce IV fluids to KVO, give lasix 20 mg IV daily, Check Echocardiogram for LV function in am.  3. Staph bacteremia  4. Cirrhosis  5. Will follow  Thanks for consultation.

## 2020-09-20 NOTE — PROGRESS NOTES
NP was messaged r/t sepsis protocol pop up. Patient was assessed and only c/o phantom limb pain and discomfort. See orders.

## 2020-09-20 NOTE — PLAN OF CARE
Problem: Skin Integrity:  Goal: Will show no infection signs and symptoms  Description: Will show no infection signs and symptoms  9/20/2020 0528 by Mannie Brady RN  Outcome: Ongoing  9/20/2020 0528 by Mannie Brady RN  Outcome: Ongoing  Goal: Absence of new skin breakdown  Description: Absence of new skin breakdown  9/20/2020 0528 by Mannie Brady RN  Outcome: Ongoing  9/20/2020 0528 by Mannie Brady RN  Outcome: Ongoing     Problem: Cardiac:  Goal: Ability to maintain vital signs within normal range will improve  Description: Ability to maintain vital signs within normal range will improve  Outcome: Ongoing

## 2020-09-20 NOTE — CONSULTS
[facility] -   Infectious diseases evaluation  admission date 9/19/2020    reason for consultation:   Sepsis    Impression :   Current:  · Sepsis picture /lactic acidosis/leukocytosis/elevated procalcitonin level/possible right lower lobe pneumonia   · Moderate right and small left pleural effusion   · Ascites  · Coagulase-negative staph single positive blood cultures likely contamination. · Right foot dorsum ulcers with no signs of infection  · A. fib with rapid ventricular response  · History of alcoholism  · History of MRSA  · Reported diarrhea prior to admission resolved    Other:  · History of left above-knee amputation    Recommendations     · Continue ceftriaxone and vancomycin IV   · Swallow study  · May need thoracentesis / paracentesis if no improvement  ·  on Cardizem drip  · Stool studies if diarrhea returns  · Follow CBC renal function  · Follow-up procalcitonin level in a.m. · Further recommendation as per hospital course        History of Present Illness:   Initial history:  Dago Camacho is a 77y.o.-year-old male nursing home resident presented to hospital with dizziness no palpitations and not feeling well associated with mild diffuse abdominal discomfort was found to have A. fib with rapid ventricular response. Poor historian  He was hypotensive initially received fluid bolus. Initial WBC was elevated at 17, renal function normal, lactic acid 2.7 and normal, C-reactive protein 99, urinalysis unremarkable, procalcitonin 10.59, amylase and lipase unremarkable  9/19/2020 CT  abdomen suggestive of liver cirrhosis and stomach and bladder wall thickening. Chest x-ray showed right lower lobe airspace opacity. 9/19/2020 CT chest showed no evidence of pulmonary embolism, small to moderate right pleural effusion with trace left effusion and bibasilar atelectasis, mild ascites.   Interval changes  9/20/2020   Reported diarrhea prior to admission, no bowel movement today  1 of 2 blood cultures grew coagulase-negative staph   AST and ALT normal  Lactic acid 3.8 today        I have personally reviewed the past medical history, past surgical history, medications, social history, and family history, and I haveupdated the database accordingly.   Past Medical History:     Past Medical History:   Diagnosis Date    History of alcoholism ([facility] [address]     Hypertension        Past Surgical  History:     Past Surgical History:   Procedure Laterality Date    HERNIA REPAIR      HYDROCELE EXCISION         Medications:      0.9 % sodium chloride  250 mL Intravenous Once    vancomycin  1,000 mg Intravenous Q12H    midodrine  10 mg Oral TID    vancomycin  1,500 mg Intravenous Once    furosemide  20 mg Intravenous Daily    midodrine  10 mg Oral Once    ferrous sulfate  325 mg Oral TID WC    escitalopram  20 mg Oral Daily    mirtazapine  15 mg Oral Nightly    magnesium oxide  400 mg Oral Daily    gabapentin  300 mg Oral TID    [START ON 10/5/2020] vitamin D  50,000 Units Oral Q30 Days    pantoprazole  40 mg Oral BID AC    sodium chloride flush  10 mL Intravenous 2 times per day    digoxin  125 mcg Oral Daily    [Held by provider] metoprolol tartrate  12.5 mg Oral BID    apixaban  5 mg Oral BID    cefTRIAXone (ROCEPHIN) IV  1 g Intravenous Q24H       Social History:     Social History     Socioeconomic History    Marital status: Single     Spouse name: Not on file    Number of children: Not on file    Years of education: Not on file    Highest education level: Not on file   Occupational History    Not on file   Social Needs    Financial resource strain: Not on file    Food insecurity     Worry: Not on file     Inability: Not on file    Transportation needs     Medical: Not on file     Non-medical: Not on file   Tobacco Use    Smoking status: Current Every Day Smoker     Packs/day: 0.50    Smokeless tobacco: Never Used   Substance and Sexual Activity    Alcohol use: Yes     Comment: former alcoholic    Drug use: No    Sexual activity: Not on file   Lifestyle    Physical activity     Days per week: Not on file     Minutes per session: Not on file    Stress: Not on file   Relationships    Social connections     Talks on phone: Not on file     Gets together: Not on file     Attends Orthodox service: Not on file     Active member of club or organization: Not on file     Attends meetings of clubs or organizations: Not on file     Relationship status: Not on file    Intimate partner violence     Fear of current or ex partner: Not on file     Emotionally abused: Not on file     Physically abused: Not on file     Forced sexual activity: Not on file   Other Topics Concern    Not on file   Social History Narrative    Not on file       Family History:   History reviewed. No pertinent family history. Allergies:   Patient has no known allergies. Review of Systems:     Review of Systems  As per history present illness other than above 14 systems reviewed were negative  Physical Examination :     Patient Vitals for the past 8 hrs:   BP Temp Temp src Pulse Resp SpO2 Weight   09/20/20 1228 102/61 97.3 °F (36.3 °C) Oral 90 16 97 % --   09/20/20 0930 -- -- -- 124 -- -- --   09/20/20 0605 -- -- -- -- -- -- 113 lb 1.6 oz (51.3 kg)       Physical Exam  Constitutional:       General: He is not in acute distress. HENT:      Head: Normocephalic and atraumatic. Right Ear: External ear normal.      Left Ear: External ear normal.   Eyes:      General: No scleral icterus. Conjunctiva/sclera: Conjunctivae normal.   Neck:      Musculoskeletal: Neck supple. No neck rigidity. Cardiovascular:      Rate and Rhythm: Normal rate and regular rhythm. Heart sounds: No murmur. Pulmonary:      Effort: Pulmonary effort is normal.      Breath sounds: Rhonchi present. No wheezing. Comments: Decreased breath sounds bilaterally  Abdominal:      General: Abdomen is flat. There is no distension. Palpations: Abdomen is soft. Tenderness: There is no abdominal tenderness. Musculoskeletal:      Comments: Left above-knee amputation site with no open wound  Left foot swelling, small open wounds to the dorsal, no erythema, no fluctuation no crepitance   Skin:     General: Skin is warm. Coloration: Skin is not jaundiced. Findings: Rash present. Neurological:      General: No focal deficit present. Mental Status: He is alert and oriented to person, place, and time. Medical Decision Making:   I have independently reviewed/ordered the following labs:    CBC with Differential:   Recent Labs     09/19/20  1146 09/20/20  0318   WBC 17.1* 12.0*   HGB 7.6* 7.0*   HCT 26.7* 24.9*   * 359   LYMPHOPCT 8*  --    MONOPCT 5  --      BMP:  Recent Labs     09/19/20  1146 09/20/20  0318   * 133*   K 3.8 3.9    106   CO2 20 18*   BUN 17 14   CREATININE 0.58* 0.50*   MG 1.8 1.7     Hepatic Function Panel:   Recent Labs     09/20/20  0318   PROT 4.0*   LABALBU 1.4*   BILITOT <0.10*   ALKPHOS 149*   ALT 10   AST 12     No results for input(s): RPR in the last 72 hours. No results for input(s): HIV in the last 72 hours. No results for input(s): BC in the last 72 hours. Lab Results   Component Value Date    CREATININE 0.50 09/20/2020    GLUCOSE 98 09/20/2020       Detailed results: Thank you for allowing us to participate in the care of this patient. Please call with questions. This note is created with the assistance of a speech recognition program.  While intending to generate adocument that actually reflects the content of the visit, the document can still have some errors including those of syntax and sound a like substitutions which may escape proof reading. It such instances, actual meaningcan be extrapolated by contextual diversion.     Aida Jacob MD  Office: [phone]  Perfect serve / office [phone]

## 2020-09-20 NOTE — CONSULTS
INITIAL CONSULTATION     HISTORY OF PRESENT ILLNESS: Jojo Wilson is a 77 y.o. 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 Reports mild diffuse abdominal discomfort. No clear triggers. No radiation. He denies any blood in the stool or melena. He reports no prior EGD or colonoscopy. He was found to have anemia. CT scan showed fluid in the abdomen with possible nodularity of the liver. He denies any history of liver disease. He has had issues with alcohol abuse in the past.  He denies any family history of GI pathology or liver disease.      PAST MEDICAL HISTORY:     Past Medical History:   Diagnosis Date    History of alcoholism ([facility] [address]     Hypertension         Past Surgical History:   Procedure Laterality Date    HERNIA REPAIR      HYDROCELE EXCISION            CURRENT MEDICATIONS:       Current Facility-Administered Medications:     midodrine (PROAMATINE) tablet 10 mg, 10 mg, Oral, TID, Rere May MD, 10 mg at 09/20/20 1509    oxyCODONE (ROXICODONE) immediate release tablet 5 mg, 5 mg, Oral, Q4H PRN **OR** oxyCODONE (ROXICODONE) immediate release tablet 10 mg, 10 mg, Oral, Q4H PRN, Rere May MD    fentaNYL (SUBLIMAZE) injection 50 mcg, 50 mcg, Intravenous, Q2H PRN, Rere May MD, 50 mcg at 09/20/20 1137    furosemide (LASIX) injection 20 mg, 20 mg, Intravenous, Daily, Regina Hayes MD, 20 mg at 09/20/20 1549    vancomycin 1000 mg IVPB in 250 mL D5W addavial, 1,000 mg, Intravenous, Q12H, Rere May MD    vancomycin (VANCOCIN) intermittent dosing (placeholder), , Other, RX Placeholder, Rere May MD    sodium chloride flush 0.9 % injection 10 mL, 10 mL, Intravenous, PRN, Teofilo Ng MD, 10 mL at 09/19/20 1248    midodrine (PROAMATINE) tablet 10 mg, 10 mg, Oral, Once, Teofilo Ng MD    ferrous sulfate (FE TABS 325) EC tablet 325 mg, 325 mg, Oral, TID WC, Esther Nye MD, 325 mg at 09/20/20 1213    escitalopram (LEXAPRO) tablet 20 mg, 20 mg, Oral, Daily, Esther Nye MD, 20 mg at 09/20/20 0930    mirtazapine (REMERON) tablet 15 mg, 15 mg, Oral, Nightly, Esther Nye MD, 15 mg at 09/19/20 2244    magnesium oxide (MAG-OX) tablet 400 mg, 400 mg, Oral, Daily, Esther Nye MD, 400 mg at 09/20/20 0930    gabapentin (NEURONTIN) capsule 300 mg, 300 mg, Oral, TID, Esther Nye MD, 300 mg at 09/20/20 1500    [START ON 10/5/2020] vitamin D (ERGOCALCIFEROL) capsule 50,000 Units, 50,000 Units, Oral, Q30 Days, Esther Nye MD    pantoprazole (PROTONIX) tablet 40 mg, 40 mg, Oral, BID AC, Esther Nye MD, 40 mg at 09/20/20 0602    0.9 % sodium chloride infusion, , Intravenous, Continuous, Esther Nye MD, Last Rate: 150 mL/hr at 09/19/20 1900    sodium chloride flush 0.9 % injection 10 mL, 10 mL, Intravenous, 2 times per day, Esther Nye MD    sodium chloride flush 0.9 % injection 10 mL, 10 mL, Intravenous, PRN, Esther Nye MD    digoxin (LANOXIN) tablet 125 mcg, 125 mcg, Oral, Daily, Esther Nye MD, 125 mcg at 09/20/20 0930    [Held by provider] metoprolol tartrate (LOPRESSOR) tablet 12.5 mg, 12.5 mg, Oral, BID, Esther Nye MD    apixaban (ELIQUIS) tablet 5 mg, 5 mg, Oral, BID, Esther Nye MD, 5 mg at 09/20/20 0930    cefTRIAXone (ROCEPHIN) 1 g IVPB in 50 mL D5W minibag, 1 g, Intravenous, Q24H, Esther Nye MD, Stopped at 09/19/20 2030    dilTIAZem 125 mg in dextrose 5 % 125 mL infusion, 5 mg/hr, Intravenous, Continuous, Lilia Mai MD, Last Rate: 15 mL/hr at 09/19/20 2042, 15 mg/hr at 09/19/20 2042       ALLERGIES:   No Known Allergies       FAMILY HISTORY: The patient's family history was reviewed.         SOCIAL HISTORY:   Social History     Socioeconomic History    Marital status: Single     Spouse name: Not on file    Number of children: Not on file    Years of education: Not on file    Highest education venous distension. No masses. Normal thyroid. Cardiovascular: S1 S2 RRR no rubs or murmurs. Pulmonary: clear BL. No accessory muscle usage. Abd Exam: Soft, NT ND, no hepato or spleno megaly, +BS, ++ ascites. No groin masses or lymphadenopathy. Extremities: No edema. Skin: Warm skin. No skin rash. No spider nevi palmar erythema naildystrophy. Joint: No joint swelling or deformity. Neurological: intact sensory. DTR+. No asterixis     LABORATORY DATA: Reviewed   Lab Results   Component Value Date    WBC 12.0 (H) 09/20/2020    HGB 7.0 (LL) 09/20/2020    HCT 24.9 (L) 09/20/2020    MCV 78.5 (L) 09/20/2020     09/20/2020     (L) 09/20/2020    K 3.9 09/20/2020     09/20/2020    CO2 18 (L) 09/20/2020    BUN 14 09/20/2020    CREATININE 0.50 (L) 09/20/2020    LABALBU 1.4 (L) 09/20/2020    BILITOT <0.10 (L) 09/20/2020    ALKPHOS 149 (H) 09/20/2020    AST 12 09/20/2020    ALT 10 09/20/2020    INR 1.2 09/20/2020      Lab Results   Component Value Date    RBC 3.17 (L) 09/20/2020    HGB 7.0 (LL) 09/20/2020    MCV 78.5 (L) 09/20/2020    MCH 22.1 (L) 09/20/2020    MCHC 28.1 (L) 09/20/2020    RDW 18.2 (H) 09/20/2020    MPV 9.9 09/20/2020    BASOPCT 0 09/19/2020    LYMPHSABS 1.43 09/19/2020    MONOSABS 0.83 09/19/2020    NEUTROABS 14.37 (H) 09/19/2020    EOSABS <0.03 09/19/2020    BASOSABS <0.03 09/19/2020          DIAGNOSTIC TESTING:   Ct Abdomen Pelvis W Iv Contrast    Result Date: 9/19/2020  EXAMINATION: CT OF THE ABDOMEN AND PELVIS WITH CONTRAST 9/19/2020 12:27 pm TECHNIQUE: CT of the abdomen and pelvis was performed with the administration of intravenous contrast. Multiplanar reformatted images are provided for review. Dose modulation, iterative reconstruction, and/or weight based adjustment of the mA/kV was utilized to reduce the radiation dose to as low as reasonably achievable. COMPARISON: None.  HISTORY: ORDERING SYSTEM PROVIDED HISTORY: Pain TECHNOLOGIST PROVIDED HISTORY: IV Only Contrast Pain Reason for Exam: Chest pain, dizziness, palpitation, abd pain Acuity: Acute Type of Exam: Initial FINDINGS: Lower Chest: Mild to moderate right pleural effusion. Small left pleural effusion. Emphysematous changes in the lungs. Compressive atelectasis both lung bases. Coronary artery calcification. Organs: Liver contains several subcentimeter hypodensities near the dome right lobe consistent with cysts. Liver is slightly lobular in contour. The spleen, pancreas, and adrenals are unremarkable. Gallstones are noted within the gallbladder. Kidneys demonstrate no evidence of hydronephrosis or nephrolithiasis. GI/Bowel: Small amount of upper abdominal ascites and haziness in the mesentery is noted. No free air or bowel obstruction is noted. Gastric folds are prominent and thickened consistent with gastritis. Pelvis: No evidence of appendicitis. Small amount of pelvic fluid. Bladder is decompressed limiting assessment but bladder wall appears prominent. Prostate is enlarged. Bladder wall thickening may be related to hypertrophy. No inguinal adenopathy is noted. Atherosclerotic calcification of the iliac vessels is noted, moderately significant. Peritoneum/Retroperitoneum: No aortic aneurysm. Diffuse atherosclerotic calcification of the aorta is noted, mild to moderate. An Bones/Soft Tissues: No acute osseous or soft tissue abnormality. 1.  Moderate right and small left pleural effusions with compressive atelectasis at the lung bases in this patient with emphysematous changes. 2.  Prominent gastric fold thickening most compatible with gastritis. 3.  Liver appears slightly lobular. Additionally, abdominal ascites is noted. The constellation of findings are consistent with cirrhosis. 4.  Enlarged prostate. Bladder wall appears thickened although bladder is suboptimally distended. 5.  Atherosclerotic disease. 6.  No bowel obstruction or urinary obstruction although gallstones are present.   No evidence of appendicitis. Xr Chest Portable    Result Date: 9/19/2020  EXAMINATION: ONE XRAY VIEW OF THE CHEST 9/19/2020 12:07 pm COMPARISON: None HISTORY: ORDERING SYSTEM PROVIDED HISTORY: Chest Pain TECHNOLOGIST PROVIDED HISTORY: Chest Pain Reason for Exam: dizzy port ap upright Acuity: Unknown Type of Exam: Unknown FINDINGS: Right basilar airspace opacity likely in the right lower lobe. Otherwise clear lungs. No definite findings of pneumothorax or pleural effusion. Normal mediastinal, hilar, and cardiac contours. Atherosclerotic calcification in the aorta. Diffuse osseous demineralization. No obvious acute fracture. Joints maintain anatomic alignment. 1. Right lower lobe airspace opacity potentially due to atelectasis, pneumonia, or aspiration. 2. Bony demineralization partially limits evaluation for fractures. Ct Chest Pulmonary Embolism W Contrast    Result Date: 9/19/2020  EXAMINATION: CTA OF THE CHEST 9/19/2020 12:27 pm TECHNIQUE: CTA of the chest was performed after the administration of intravenous contrast.  Multiplanar reformatted images are provided for review. MIP images are provided for review. Dose modulation, iterative reconstruction, and/or weight based adjustment of the mA/kV was utilized to reduce the radiation dose to as low as reasonably achievable. COMPARISON: None. HISTORY: ORDERING SYSTEM PROVIDED HISTORY: Chest Pain r/o PE TECHNOLOGIST PROVIDED HISTORY: Chest Pain r/o PE Reason for Exam: Chest pain, dizziness, palpitation, abd pain Acuity: Acute Type of Exam: Initial Elevated BNP FINDINGS: Pulmonary Arteries: Pulmonary arteries are adequately opacified for evaluation. No evidence of intraluminal filling defect to suggest pulmonary embolism. Main pulmonary artery is normal in caliber. Mediastinum: No evidence of mediastinal lymphadenopathy. The heart and pericardium demonstrate no acute abnormality. Coronary artery calcification is noted.   There is no acute abnormality of the thoracic aorta. Lungs/pleura: Small to moderate right pleural effusion with right basilar atelectasis is evident with trace left effusion. Emphysematous changes are present in the lungs. Tracheobronchial tree is patent. Upper Abdomen: Patient has some ascites in the upper abdomen. Hazy mesentery is noted. Atherosclerotic disease in the aorta is seen. Soft Tissues/Bones: No acute osseous or soft tissue abnormality. 1.  No evidence of pulmonary embolism. 2.  Emphysematous changes. 3. small to moderate right pleural effusion with trace left effusion and basilar atelectasis. 4.  Mild ascites upper abdomen. IMPRESSION: Mr. Barak Rodriguez is a 77 y.o. male with ascites. Possible liver nodularity. Anemia. We will plan for paracentesis with full analysis of the fluid. Anemia work-up. Accordingly may need EGD and colonoscopy. Monitor H&H closely. Transfuse as needed keep hemoglobin above 7. Thank you for allowing me to participate in the care of Mr. Barak Rodriguez. For any further questions please do not hesitate to contact me.        MD Jean Carlos Baldwin

## 2020-09-20 NOTE — PROGRESS NOTES
Physical Therapy    Facility/Department: [facility] PROGRESSIVE CARE  Initial Assessment    NAME: Marco Sanderson  : 1945  MRN: [id number]    Date of Service: 2020    Discharge Recommendations:  [address]       This is a 36-year-old male that presents with complaints of dizziness, nausea generally not feeling well. The patient was at the assisted living facility, he was in a wheelchair and began feeling extremely dizzy, he fell he was going to vomit and they said to call EMS. The patient denies any chest pain, he has some mild diffuse abdominal pain, he denies any fevers or chills. He denies any dysuria hematuria, he has no diarrhea. RN reports patient is medically stable for therapy treatment this date. Chart reviewed prior to treatment and patient is agreeable for therapy. All lines intact and patient positioned comfortably at end of treatment. All patient needs addressed prior to ending therapy session. Assessment   Body structures, Functions, Activity limitations: Decreased functional mobility ; Decreased endurance;Decreased strength;Decreased balance; Increased pain;Decreased safe awareness  Assessment: Pt limited this date by fatigue and pain in RLE. Pt refused to attempt supine<>sit as he was in too much pain. Recommend to clarify WB status on RLE due to wounds before attempting transfer. Specific instructions for Next Treatment: if tolerable, assess supine<>sit and stand pivot transfer if safe.   Prognosis: Fair  Decision Making: Medium Complexity  Clinical Presentation: evolving  PT Education: Goals;PT Role;Functional Mobility Training;Plan of Care;General Safety;Pressure Relief;Home Exercise Program  Patient Education: Emphasis on exercises while in bed to improve strength  REQUIRES PT FOLLOW UP: Yes  Activity Tolerance  Activity Tolerance: Patient limited by fatigue;Patient limited by pain       Patient Diagnosis(es): The primary encounter diagnosis was Dizziness. Diagnoses of Atrial fibrillation with RVR ([facility] [address] and Acute on chronic congestive heart failure, unspecified heart failure type [facility]) were also pertinent to this visit. has a past medical history of History of alcoholism ([facility] [address] and Hypertension. has a past surgical history that includes hernia repair and Hydrocele surgery. Restrictions  Restrictions/Precautions  Restrictions/Precautions: Fall Risk, Up as Tolerated, Contact Precautions  Position Activity Restriction  Other position/activity restrictions: L AKA, wounds on RLE, telemetry, RUE IV  Vision/Hearing  Vision: Impaired  Vision Exceptions: Wears glasses at all times  Hearing: Exceptions to [facility]  Hearing Exceptions: Hard of hearing/hearing concerns     Subjective  General  Chart Reviewed: Yes  Patient assessed for rehabilitation services?: Yes  Response To Previous Treatment: Not applicable  Family / Caregiver Present: No  Follows Commands: Within Functional Limits  Subjective  Subjective: Pt states he is having pain in RLE from all the wounds, 7/10 pain. Orientation  Orientation  Overall Orientation Status: Impaired  Orientation Level: Oriented to place;Oriented to person;Disoriented to time;Oriented to situation  Social/Functional History  Social/Functional History  Type of Home: (Pt states he lives at [facility], assisted living)  ADL Assistance: Needs assistance  Homemaking Assistance: Needs assistance  Ambulation Assistance: (does not ambulate at baseline)  Transfer Assistance: Needs assistance(states is able to stand pivot transfer on RLE, no prosthetic on LLE.)  Additional Comments: Pt states he lives at [facility], assisted living. Pt did not give specifics as to what he requires help with. When asked what the staff helps pt with he replied, \"nothing\". Pt stated he is able to transfer from bed<>chair with a stand pivot on the RLE. Pt does not have a prosthetic leg for LLE.  Limited info obtained, as pt would not answer through and was unable to reach bed rails over head. Transfers  Comment: Transfers not assessed this date. Pt states he is able to perform stand pivot transfers on RLE. RLE this date is in pain and has wounds. Per RN, wound care will be assessing RLE and will see if vascular/podiatry consult is needed. Ambulation  Ambulation?: No(non-ambulatory at baseline)     Balance  Comments: Unable to assess posture this date, as pt refused to dangle at EOB stating he was in too much pain. Exercises  Straight Leg Raise: 10 reps  Quad Sets: 10 reps  Heelslides: 10 reps  Gluteal Sets: 10 reps  Hip Flexion: 10 reps  Hip Abduction: 10 reps  Ankle Pumps: 10 reps  Comments: Verbal cueing for proper form. Increased time to complete due to fatigue. Plan   Plan  Times per week: 1-2x day / 5-6 days per week  Specific instructions for Next Treatment: if tolerable, assess supine<>sit and stand pivot transfer if safe. Current Treatment Recommendations: Strengthening, Functional Mobility Training, Positioning, Safety Education & Training, Endurance Training  Safety Devices  Type of devices: All fall risk precautions in place, Bed alarm in place, Call light within reach, Nurse notified, Left in bed      OutComes Score  AM-PAC Score  AM-PAC Inpatient Mobility Raw Score : 8 (09/20/20 1255)  AM-PAC Inpatient T-Scale Score : 28.52 (09/20/20 1255)  Mobility Inpatient CMS 0-100% Score: 86.62 (09/20/20 1255)  Mobility Inpatient CMS G-Code Modifier : CM (09/20/20 1255)          Goals  Short term goals  Time Frame for Short term goals: 12 visits  Short term goal 1: Pt will roll left<>right with SBA and use of handrails  Short term goal 2: Pt will scoot self in bed with SBA in order to reposition self. Short term goal 3: Pt will tolerate 25mins of PT to improve activity tolerance, strength, and functional mobility  Patient Goals   Patient goals :  To get better       Therapy Time   Individual Concurrent Group Co-treatment   Time In 7543 Time Out 1128         Minutes 36          Tx time: 26mins       Taina Carcamo, PT

## 2020-09-20 NOTE — PROGRESS NOTES
Dressing was applied to right foot. When compression stocking was taken off a large amount of slough was in the sock and around the area of foot. Foot was cleaned with normal saline soaked drainage sponges. Silver alginate was applied to the ulcerations that were evident after the slough was removed, covered with ABD, and wrapped with a kerlix and ACE wrap. Patient's toenails are very long, thick, and greenish yellow in color. Ulcerations on the top of his foot and heel were red, with some serous drainage. Patient was medicated with morphine prior to dressing change and he tolerated the procedure well. He is hypersensitive to any tough or movement and the morphine helped alleviate this discomfort along with the phantom limb pain he was experiencing. RN will continue to monitor and reassess.

## 2020-09-20 NOTE — PROGRESS NOTES
[facility]  Office: [address], DO, Adrien Kras, DO, Aurora Ng, DO, Matthias David Blood, DO, Suzanna Ko MD, Alison Barber MD, Juan A Ball MD, Anshu Wolf MD, Jose Salinas MD, Aden Coronel MD, Yeny Hebert MD, Jackelyn Thorpe MD, Cal Ny MD, Kevin Bryson, DO, Michaela Pfeiffer MD, Deepak Graves MD, Rory Carrillo, DO, Cira Best MD,  Jad Reardon, DO, Quinn Fraser MD, Joel Kern MD, Roseline Godoy [facility], [address], [facility], Asad Rushing, CNP, Flor Capps, CNS, Oliver Jean-Baptiste, CNP, Osmin Section, CNP, Asia Roman, CNP, Bandar Sharpe, CNP, Yaneli Fernandez, CNP, Ye Mcclain PA-C, Eddie Yanes, [facility], Elzbieta Gordon, CNP, Aaliyah Soda, CNP, Charmaine Husbands, CNP, Colletta Moros, CNP, Wilfrido Mcdonald [facility]    Progress Note    9/20/2020    9:22 AM    Name:   Carolina Mott  MRN:     [id number]     Acct:      [de-identified]   Room:   [address] [place] [address] Day:  1  Admit Date:  9/19/2020 11:24 AM    PCP:   No primary care provider on file. Code Status:  Full Code    Subjective:     C/C:   Chief Complaint   Patient presents with    Dizziness    Palpitations     Interval History Status: not changed. Patient seen and examined at bedside, no acute events overnight. Screaming from pain after they cleaned his RLE wound and applied dressing  HR still in 110-120  Hb dropped, will Tx 1 U PRBCS  Patient vitals, labs and all providers notes were reviewed,from overnight shift and morning updates were noted and discussed with the nurse    Brief History:     Carolina Mott is a 77 y.o. Non-/non  male who presents with Dizziness and Palpitations   and is admitted to the hospital for the management of Atrial fibrillation with RVR ([facility] [address]      Patient is a poor historian by reviewing the chart most of the information back dated 2/20/2018, apparently patient has known history of A. fib, dizziness, weakness, numbness and headaches. All other systems reviewed and are negative. Medications: Allergies:  No Known Allergies    Current Meds:   Scheduled Meds:    0.9 % sodium chloride  250 mL Intravenous Once    vancomycin  1,000 mg Intravenous Q12H    midodrine  10 mg Oral TID    midodrine  10 mg Oral Once    ferrous sulfate  325 mg Oral TID WC    escitalopram  20 mg Oral Daily    mirtazapine  15 mg Oral Nightly    magnesium oxide  400 mg Oral Daily    gabapentin  300 mg Oral TID    [START ON 10/5/2020] vitamin D  50,000 Units Oral Q30 Days    pantoprazole  40 mg Oral BID AC    sodium chloride flush  10 mL Intravenous 2 times per day    digoxin  125 mcg Oral Daily    metoprolol tartrate  12.5 mg Oral BID    apixaban  5 mg Oral BID    cefTRIAXone (ROCEPHIN) IV  1 g Intravenous Q24H     Continuous Infusions:    sodium chloride 150 mL/hr at 20 1900    dilTIAZem (CARDIZEM) 125 mg in dextrose 5% 125 mL infusion 15 mg/hr (20)     PRN Meds: morphine, sodium chloride flush, sodium chloride flush, acetaminophen **OR** acetaminophen    Data:     Past Medical History:   has a past medical history of History of alcoholism ([address] and Hypertension. Social History:   reports that he has been smoking. He has been smoking about 0.50 packs per day. He has never used smokeless tobacco. He reports current alcohol use. He reports that he does not use drugs. Family History: History reviewed. No pertinent family history. Vitals:  BP (!) 100/50   Pulse 99   Temp 98.2 °F (36.8 °C) (Oral)   Resp 16   Ht 5' 10\" (1.778 m)   Wt 113 lb 1.6 oz (51.3 kg)   SpO2 95%   BMI 16.23 kg/m²   Temp (24hrs), Av.7 °F (36.5 °C), Min:96 °F (35.6 °C), Max:98.4 °F (36.9 °C)    No results for input(s): POCGLU in the last 72 hours. I/O (24Hr):     Intake/Output Summary (Last 24 hours) at 2020 0922  Last data filed at 2020 0640  Gross per 24 hour   Intake 1939 ml   Output 400 ml   Net 1539 ml       Labs:  Hematology:  Recent Labs     09/19/20  1146 09/20/20  0318   WBC 17.1* 12.0*   RBC 3.49* 3.17*   HGB 7.6* 7.0*   HCT 26.7* 24.9*   MCV 76.5* 78.5*   MCH 21.8* 22.1*   MCHC 28.5 28.1*   RDW 18.0* 18.2*   * 359   MPV 10.0 9.9   SEDRATE 12  --    CRP 99.0*  --    INR 1.1 1.2     Chemistry:  Recent Labs     09/19/20  1146 09/19/20  1342 09/20/20  0318   *  --  133*   K 3.8  --  3.9     --  106   CO2 20  --  18*   GLUCOSE 82  --  98   BUN 17  --  14   CREATININE 0.58*  --  0.50*   MG 1.8  --  1.7   ANIONGAP 12  --  9   LABGLOM >60  --  >60   GFRAA >60  --  >60   CALCIUM 7.3*  --  6.5*   CAION  --   --  1.12*   PHOS  --   --  1.9*   PROBNP 800*  --   --    TROPHS 21 12  --      Recent Labs     09/19/20  1819 09/20/20  0318   PROT  --  4.0*   LABALBU  --  1.4*   AST  --  12   ALT  --  10   ALKPHOS  --  149*   BILITOT  --  <0.10*   AMYLASE 41  --    LIPASE 5*  --      ABG:No results found for: POCPH, PHART, PH, POCPCO2, ACU6DLX, PCO2, POCPO2, PO2ART, PO2, POCHCO3, HNR7DRZ, HCO3, NBEA, PBEA, BEART, BE, THGBART, THB, XJG6SIK, ADJG1ITW, T3ZQQQAD, O2SAT, FIO2  Lab Results   Component Value Date/Time    SPECIAL NOT REPORTED 09/19/2020 11:55 AM    SPECIAL NOT REPORTED 09/19/2020 11:55 AM     Lab Results   Component Value Date/Time    CULTURE NO GROWTH 16 HOURS 09/19/2020 11:55 AM    CULTURE (A) 09/19/2020 11:55 AM     POSITIVE Blood Culture Results called to and read back by: 0850 09/20/2020 RAHUL    CULTURE  09/19/2020 11:55 AM     DIRECT GRAM STAIN FROM BOTTLE: GRAM POSITIVE COCCI IN CLUSTERS    CULTURE (A) 09/19/2020 11:55 AM     Coagulase negative Staphylococcus species detected by PCR       Radiology:  Ct Abdomen Pelvis W Iv Contrast    Result Date: 9/19/2020  1. Moderate right and small left pleural effusions with compressive atelectasis at the lung bases in this patient with emphysematous changes. 2.  Prominent gastric fold thickening most compatible with gastritis.  3. Liver appears slightly lobular. Additionally, abdominal ascites is noted. The constellation of findings are consistent with cirrhosis. 4.  Enlarged prostate. Bladder wall appears thickened although bladder is suboptimally distended. 5.  Atherosclerotic disease. 6.  No bowel obstruction or urinary obstruction although gallstones are present. No evidence of appendicitis. Xr Chest Portable    Result Date: 9/19/2020  1. Right lower lobe airspace opacity potentially due to atelectasis, pneumonia, or aspiration. 2. Bony demineralization partially limits evaluation for fractures. Ct Chest Pulmonary Embolism W Contrast    Result Date: 9/19/2020  1. No evidence of pulmonary embolism. 2.  Emphysematous changes. 3. small to moderate right pleural effusion with trace left effusion and basilar atelectasis. 4.  Mild ascites upper abdomen. Physical Examination:        Physical Exam  Vitals signs and nursing note reviewed. Constitutional:       General: He is not in acute distress. Appearance: He is well-developed. He is not diaphoretic. Comments: Underweight and malnourished, poor personal hygiene   HENT:      Head: Normocephalic and atraumatic. Right Ear: Hearing normal.      Left Ear: Hearing normal.      Nose: Nose normal. No rhinorrhea. Eyes:      General: Lids are normal.      Extraocular Movements:      Right eye: Normal extraocular motion. Left eye: Normal extraocular motion. Conjunctiva/sclera: Conjunctivae normal.      Right eye: Right conjunctiva is not injected. Left eye: Left conjunctiva is not injected. Pupils: Pupils are equal, round, and reactive to light. Pupils are equal.      Right eye: Pupil is reactive. Left eye: Pupil is reactive. Neck:      Musculoskeletal: Neck supple. Thyroid: No thyromegaly. Vascular: No carotid bruit. Trachea: Trachea and phonation normal. No tracheal deviation.    Cardiovascular:      Rate and Rhythm: Normal rate. Rhythm irregular. Pulses: Normal pulses. Heart sounds: Normal heart sounds. No murmur. Pulmonary:      Effort: Pulmonary effort is normal. No respiratory distress. Breath sounds: No stridor. Examination of the right-lower field reveals decreased breath sounds. Decreased breath sounds present. Abdominal:      General: Bowel sounds are normal. There is no distension. Palpations: Abdomen is soft. There is no mass. Tenderness: There is no abdominal tenderness. There is no guarding. Musculoskeletal:         General: No tenderness. Right lower leg: Edema present. Comments: Left above-knee amputation   Skin:     General: Skin is warm and dry. Findings: No erythema, lesion or rash.         Assessment:        Hospital Problems           Last Modified POA    * (Principal) Atrial fibrillation with RVR ([address] 9/19/2020 Yes    Cirrhosis of liver with ascites ([address] 9/19/2020 Yes    Microcytic anemia 9/19/2020 Yes    Thrombocytosis ([address] 9/19/2020 Yes    SIRS (systemic inflammatory response syndrome) ([address] 9/19/2020 Yes    Elevated brain natriuretic peptide (BNP) level 9/19/2020 Yes    Severe malnutrition ([address] 9/19/2020 Yes    Hyponatremia 9/19/2020 Yes    History of alcoholism ([address] 9/19/2020 Yes    Chronic atrial fibrillation 9/19/2020 Yes    Leukocytosis 9/19/2020 Yes    Sarcopenia 9/19/2020 Yes          Plan:          Sepsis: Patient had leukocytosis and elevated heart rate initially along with thrombocytosis: inflammatory markers elevated, 1/2 blood cultures positive, [facility] and [facility]     Chronic A. fib presented with A. fib with RVR:  HR still in 110-120,blood pressure on the lower side on cardizem drip per cardiology ,  echocardiogram in am , cardiology is ok with holding AC till GI evaluation       Microcytic anemia no clear baseline, last hemoglobin in the system 2 years ago was around 9  we will order fecal occult blood and monitor hemoglobin and transfuse if less than 7..  Order FOBT, noted patient on iron supplement as home medication, will resume  Hb drropped since admission, Tx 1 UPRBCs today, add GI consult      Elevated BNP/bilateral pleural effusion: No known history of heart failure, echo is pending.     Liver cirrhosis with ascites on CT abdomen: History of alcoholism, no formal diagnosis in the past, INR is 1.1.   Appreciate GI input       Severe malnutrition/cytopenia: Needed addition consult start oral supplementation     Hypophosphatemia : replace     Mild hyponatremia: Continue to monitor daily.     PT/OT     RLE wounds: add wound care , pain control, see ordres     Discussed with the patient, nurse and with GI NP    Trav Tubbs MD  9/20/2020  9:22 AM

## 2020-09-21 ENCOUNTER — APPOINTMENT (OUTPATIENT)
Dept: INTERVENTIONAL RADIOLOGY/VASCULAR | Age: 75
DRG: 871 | End: 2020-09-21
Payer: COMMERCIAL

## 2020-09-21 ENCOUNTER — APPOINTMENT (OUTPATIENT)
Dept: GENERAL RADIOLOGY | Age: 75
DRG: 871 | End: 2020-09-21
Payer: COMMERCIAL

## 2020-09-21 LAB
ABO/RH: NORMAL
ABSOLUTE EOS #: <0.03 K/UL (ref 0–0.44)
ABSOLUTE IMMATURE GRANULOCYTE: 0.27 K/UL (ref 0–0.3)
ABSOLUTE LYMPH #: 1.81 K/UL (ref 1.1–3.7)
ABSOLUTE MONO #: 0.72 K/UL (ref 0.1–1.2)
AFP: 1.6 UG/L
ALBUMIN FLUID: 0.2 G/DL
ALPHA-1 ANTITRYPSIN: 167 MG/DL (ref 90–200)
AMYLASE FLUID: 7 U/L
ANION GAP SERPL CALCULATED.3IONS-SCNC: 9 MMOL/L (ref 9–17)
ANTI-NUCLEAR ANTIBODY (ANA): NEGATIVE
ANTIBODY SCREEN: NEGATIVE
ARM BAND NUMBER: NORMAL
BASOPHILS # BLD: 0 % (ref 0–2)
BASOPHILS ABSOLUTE: 0.03 K/UL (ref 0–0.2)
BLD PROD TYP BPU: NORMAL
BUN BLDV-MCNC: 12 MG/DL (ref 8–23)
BUN/CREAT BLD: 25 (ref 9–20)
CALCIUM SERPL-MCNC: 6.5 MG/DL (ref 8.6–10.4)
CASE NUMBER:: NORMAL
CERULOPLASMIN: 14 MG/DL (ref 15–30)
CHLORIDE BLD-SCNC: 100 MMOL/L (ref 98–107)
CMV IGM: 0.2
CO2: 20 MMOL/L (ref 20–31)
CREAT SERPL-MCNC: 0.48 MG/DL (ref 0.7–1.2)
CROSSMATCH RESULT: NORMAL
CULTURE: ABNORMAL
CYTOMEGALOVIRUS IGG ANTIBODY: 26.8
DIFFERENTIAL TYPE: ABNORMAL
DISPENSE STATUS BLOOD BANK: NORMAL
EOSINOPHILS RELATIVE PERCENT: 0 % (ref 1–4)
EXPIRATION DATE: NORMAL
FOLATE: 2 NG/ML
GFR AFRICAN AMERICAN: >60 ML/MIN
GFR NON-AFRICAN AMERICAN: >60 ML/MIN
GFR SERPL CREATININE-BSD FRML MDRD: ABNORMAL ML/MIN/{1.73_M2}
GFR SERPL CREATININE-BSD FRML MDRD: ABNORMAL ML/MIN/{1.73_M2}
GLUCOSE BLD-MCNC: 83 MG/DL (ref 70–99)
GLUCOSE, FLUID: 89 MG/DL
HAV IGM SER IA-ACNC: NONREACTIVE
HCT VFR BLD CALC: 29.3 % (ref 40.7–50.3)
HEMOGLOBIN: 8.7 G/DL (ref 13–17)
HEPATITIS B CORE IGM ANTIBODY: NONREACTIVE
HEPATITIS B SURFACE ANTIGEN: NONREACTIVE
HEPATITIS C ANTIBODY: NONREACTIVE
IGA: 350 MG/DL (ref 70–400)
IMMATURE GRANULOCYTES: 2 %
IRON SATURATION: 27 % (ref 20–55)
IRON: 26 UG/DL (ref 59–158)
LACTATE DEHYDROGENASE, FLUID: 49 U/L
LV EF: 74 %
LVEF MODALITY: NORMAL
LYMPHOCYTES # BLD: 13 % (ref 24–43)
Lab: ABNORMAL
MCH RBC QN AUTO: 23 PG (ref 25.2–33.5)
MCHC RBC AUTO-ENTMCNC: 29.7 G/DL (ref 28.4–34.8)
MCV RBC AUTO: 77.5 FL (ref 82.6–102.9)
MONOCYTES # BLD: 5 % (ref 3–12)
NRBC AUTOMATED: 0 PER 100 WBC
PDW BLD-RTO: 18.3 % (ref 11.8–14.4)
PLATELET # BLD: 334 K/UL (ref 138–453)
PLATELET ESTIMATE: ABNORMAL
PMV BLD AUTO: 9.5 FL (ref 8.1–13.5)
POTASSIUM SERPL-SCNC: 3.4 MMOL/L (ref 3.7–5.3)
PROCALCITONIN: 48.67 NG/ML
RBC # BLD: 3.78 M/UL (ref 4.21–5.77)
RBC # BLD: ABNORMAL 10*6/UL
SARS-COV-2, RAPID: NOT DETECTED
SARS-COV-2: NORMAL
SARS-COV-2: NORMAL
SEG NEUTROPHILS: 80 % (ref 36–65)
SEGMENTED NEUTROPHILS ABSOLUTE COUNT: 11.14 K/UL (ref 1.5–8.1)
SODIUM BLD-SCNC: 129 MMOL/L (ref 135–144)
SOURCE: NORMAL
SPECIMEN DESCRIPTION: ABNORMAL
SPECIMEN DESCRIPTION: NORMAL
SPECIMEN TYPE: NORMAL
TOTAL IRON BINDING CAPACITY: 98 UG/DL (ref 250–450)
TOTAL PROTEIN, BODY FLUID: <1 G/DL
TRANSFUSION STATUS: NORMAL
UNIT DIVISION: 0
UNIT NUMBER: NORMAL
UNSATURATED IRON BINDING CAPACITY: 72 UG/DL (ref 112–347)
VITAMIN B-12: >2000 PG/ML (ref 232–1245)
WBC # BLD: 14 K/UL (ref 3.5–11.3)
WBC # BLD: ABNORMAL 10*3/UL

## 2020-09-21 PROCEDURE — 85025 COMPLETE CBC W/AUTO DIFF WBC: CPT

## 2020-09-21 PROCEDURE — 6370000000 HC RX 637 (ALT 250 FOR IP): Performed by: FAMILY MEDICINE

## 2020-09-21 PROCEDURE — 82042 OTHER SOURCE ALBUMIN QUAN EA: CPT

## 2020-09-21 PROCEDURE — U0002 COVID-19 LAB TEST NON-CDC: HCPCS

## 2020-09-21 PROCEDURE — 0W9G3ZZ DRAINAGE OF PERITONEAL CAVITY, PERCUTANEOUS APPROACH: ICD-10-PCS | Performed by: RADIOLOGY

## 2020-09-21 PROCEDURE — 36415 COLL VENOUS BLD VENIPUNCTURE: CPT

## 2020-09-21 PROCEDURE — 2580000003 HC RX 258: Performed by: FAMILY MEDICINE

## 2020-09-21 PROCEDURE — 88305 TISSUE EXAM BY PATHOLOGIST: CPT

## 2020-09-21 PROCEDURE — 49083 ABD PARACENTESIS W/IMAGING: CPT | Performed by: RADIOLOGY

## 2020-09-21 PROCEDURE — 84157 ASSAY OF PROTEIN OTHER: CPT

## 2020-09-21 PROCEDURE — P9047 ALBUMIN (HUMAN), 25%, 50ML: HCPCS | Performed by: FAMILY MEDICINE

## 2020-09-21 PROCEDURE — 88112 CYTOPATH CELL ENHANCE TECH: CPT

## 2020-09-21 PROCEDURE — 80048 BASIC METABOLIC PNL TOTAL CA: CPT

## 2020-09-21 PROCEDURE — 89051 BODY FLUID CELL COUNT: CPT

## 2020-09-21 PROCEDURE — 6370000000 HC RX 637 (ALT 250 FOR IP): Performed by: INTERNAL MEDICINE

## 2020-09-21 PROCEDURE — 83615 LACTATE (LD) (LDH) ENZYME: CPT

## 2020-09-21 PROCEDURE — 87075 CULTR BACTERIA EXCEPT BLOOD: CPT

## 2020-09-21 PROCEDURE — 99232 SBSQ HOSP IP/OBS MODERATE 35: CPT | Performed by: INTERNAL MEDICINE

## 2020-09-21 PROCEDURE — 84145 PROCALCITONIN (PCT): CPT

## 2020-09-21 PROCEDURE — 82945 GLUCOSE OTHER FLUID: CPT

## 2020-09-21 PROCEDURE — 99233 SBSQ HOSP IP/OBS HIGH 50: CPT | Performed by: FAMILY MEDICINE

## 2020-09-21 PROCEDURE — 92611 MOTION FLUOROSCOPY/SWALLOW: CPT

## 2020-09-21 PROCEDURE — 74230 X-RAY XM SWLNG FUNCJ C+: CPT

## 2020-09-21 PROCEDURE — 6360000002 HC RX W HCPCS: Performed by: FAMILY MEDICINE

## 2020-09-21 PROCEDURE — 71045 X-RAY EXAM CHEST 1 VIEW: CPT

## 2020-09-21 PROCEDURE — 87070 CULTURE OTHR SPECIMN AEROBIC: CPT

## 2020-09-21 PROCEDURE — 87205 SMEAR GRAM STAIN: CPT

## 2020-09-21 PROCEDURE — 2060000000 HC ICU INTERMEDIATE R&B

## 2020-09-21 PROCEDURE — 82150 ASSAY OF AMYLASE: CPT

## 2020-09-21 PROCEDURE — 93306 TTE W/DOPPLER COMPLETE: CPT

## 2020-09-21 PROCEDURE — C1729 CATH, DRAINAGE: HCPCS

## 2020-09-21 PROCEDURE — APPNB30 APP NON BILLABLE TIME 0-30 MINS: Performed by: NURSE PRACTITIONER

## 2020-09-21 RX ORDER — FUROSEMIDE 10 MG/ML
20 INJECTION INTRAMUSCULAR; INTRAVENOUS ONCE
Status: COMPLETED | OUTPATIENT
Start: 2020-09-21 | End: 2020-09-21

## 2020-09-21 RX ORDER — MIDODRINE HYDROCHLORIDE 10 MG/1
10 TABLET ORAL 3 TIMES DAILY
Status: DISCONTINUED | OUTPATIENT
Start: 2020-09-21 | End: 2020-09-29 | Stop reason: HOSPADM

## 2020-09-21 RX ORDER — DIGOXIN 0.25 MG/ML
125 INJECTION INTRAMUSCULAR; INTRAVENOUS ONCE
Status: COMPLETED | OUTPATIENT
Start: 2020-09-21 | End: 2020-09-21

## 2020-09-21 RX ORDER — SODIUM CHLORIDE 9 MG/ML
INJECTION, SOLUTION INTRAVENOUS CONTINUOUS
Status: DISCONTINUED | OUTPATIENT
Start: 2020-09-21 | End: 2020-09-24

## 2020-09-21 RX ORDER — ALBUMIN (HUMAN) 12.5 G/50ML
25 SOLUTION INTRAVENOUS ONCE
Status: COMPLETED | OUTPATIENT
Start: 2020-09-21 | End: 2020-09-21

## 2020-09-21 RX ORDER — POTASSIUM CHLORIDE 20 MEQ/1
40 TABLET, EXTENDED RELEASE ORAL PRN
Status: DISCONTINUED | OUTPATIENT
Start: 2020-09-21 | End: 2020-09-29 | Stop reason: HOSPADM

## 2020-09-21 RX ORDER — OXYCODONE HYDROCHLORIDE 5 MG/1
5 TABLET ORAL EVERY 8 HOURS PRN
Status: DISCONTINUED | OUTPATIENT
Start: 2020-09-21 | End: 2020-09-29 | Stop reason: HOSPADM

## 2020-09-21 RX ORDER — POTASSIUM CHLORIDE 7.45 MG/ML
10 INJECTION INTRAVENOUS PRN
Status: DISCONTINUED | OUTPATIENT
Start: 2020-09-21 | End: 2020-09-29 | Stop reason: HOSPADM

## 2020-09-21 RX ADMIN — PANTOPRAZOLE SODIUM 40 MG: 40 TABLET, DELAYED RELEASE ORAL at 17:22

## 2020-09-21 RX ADMIN — VANCOMYCIN HYDROCHLORIDE 1000 MG: 1 INJECTION, POWDER, LYOPHILIZED, FOR SOLUTION INTRAVENOUS at 11:01

## 2020-09-21 RX ADMIN — Medication 10 ML: at 11:07

## 2020-09-21 RX ADMIN — SODIUM CHLORIDE: 9 INJECTION, SOLUTION INTRAVENOUS at 14:37

## 2020-09-21 RX ADMIN — PANTOPRAZOLE SODIUM 40 MG: 40 TABLET, DELAYED RELEASE ORAL at 05:36

## 2020-09-21 RX ADMIN — OXYCODONE HYDROCHLORIDE 5 MG: 5 TABLET ORAL at 11:02

## 2020-09-21 RX ADMIN — MIDODRINE HYDROCHLORIDE 10 MG: 10 TABLET ORAL at 15:31

## 2020-09-21 RX ADMIN — Medication 10 ML: at 21:45

## 2020-09-21 RX ADMIN — POTASSIUM CHLORIDE 40 MEQ: 20 TABLET, EXTENDED RELEASE ORAL at 15:31

## 2020-09-21 RX ADMIN — FERROUS SULFATE TAB EC 325 MG (65 MG FE EQUIVALENT) 325 MG: 325 (65 FE) TABLET DELAYED RESPONSE at 17:22

## 2020-09-21 RX ADMIN — DILTIAZEM HYDROCHLORIDE 30 MG: 30 TABLET, FILM COATED ORAL at 17:22

## 2020-09-21 RX ADMIN — MAGNESIUM GLUCONATE 500 MG ORAL TABLET 400 MG: 500 TABLET ORAL at 11:02

## 2020-09-21 RX ADMIN — FERROUS SULFATE TAB EC 325 MG (65 MG FE EQUIVALENT) 325 MG: 325 (65 FE) TABLET DELAYED RESPONSE at 11:03

## 2020-09-21 RX ADMIN — FUROSEMIDE 20 MG: 10 INJECTION, SOLUTION INTRAMUSCULAR; INTRAVENOUS at 15:31

## 2020-09-21 RX ADMIN — DIGOXIN 125 MCG: 0.25 INJECTION INTRAMUSCULAR; INTRAVENOUS at 08:08

## 2020-09-21 RX ADMIN — GABAPENTIN 300 MG: 300 CAPSULE ORAL at 15:31

## 2020-09-21 RX ADMIN — GABAPENTIN 300 MG: 300 CAPSULE ORAL at 11:03

## 2020-09-21 RX ADMIN — DIGOXIN 125 MCG: 125 TABLET ORAL at 11:02

## 2020-09-21 RX ADMIN — ESCITALOPRAM OXALATE 20 MG: 10 TABLET ORAL at 11:03

## 2020-09-21 RX ADMIN — MIDODRINE HYDROCHLORIDE 10 MG: 10 TABLET ORAL at 11:03

## 2020-09-21 RX ADMIN — ALBUMIN (HUMAN) 25 G: 0.25 INJECTION, SOLUTION INTRAVENOUS at 15:31

## 2020-09-21 RX ADMIN — CEFTRIAXONE SODIUM 1 G: 1 INJECTION, POWDER, FOR SOLUTION INTRAMUSCULAR; INTRAVENOUS at 17:22

## 2020-09-21 RX ADMIN — SODIUM CHLORIDE: 9 INJECTION, SOLUTION INTRAVENOUS at 15:23

## 2020-09-21 ASSESSMENT — PAIN SCALES - GENERAL
PAINLEVEL_OUTOF10: 0
PAINLEVEL_OUTOF10: 0
PAINLEVEL_OUTOF10: 7

## 2020-09-21 ASSESSMENT — ENCOUNTER SYMPTOMS
SHORTNESS OF BREATH: 0
NAUSEA: 0
WHEEZING: 0
COUGH: 0
DIARRHEA: 1
CONSTIPATION: 0
ABDOMINAL PAIN: 0
RESPIRATORY NEGATIVE: 1
CHEST TIGHTNESS: 0
GASTROINTESTINAL NEGATIVE: 1
VOMITING: 0
BLOOD IN STOOL: 0

## 2020-09-21 NOTE — CARE COORDINATION
Social work: Writer informed by Elen/RN patient failed swallow study, recommendation is Dysphagia III diet/nectar thick liquids. As previously charted, [facility] stated [facility] cannot accommodate special diet, therefore if he failed SS he would need SNF. SW met with patient at bedside to discuss, he was somewhat confused, writer asked patient to look over facility list, states he would think about it. Writer also left voicemail for Brandon/son ([phone] to discuss snf choices-await call back. Javan Marinelli stated earlier in the day she would be coming to hospital to onsite patient. Writer faxed SS to [facility]([phone]). Will need to confirm with [facility] if patient can or cannot return on recommended diet. SHANNON attempted to call [phone], no answer.

## 2020-09-21 NOTE — PROGRESS NOTES
[facility]  Progress Note    Jojo Wilson  MRN: [id number]  Date: 9/21/2020  LOS: 2     Reason for F/U :   Sepsis    Impression :   1. Systemic inflammatory response syndrome with concern for sepsis  2. Right-sided effusion with associated atelectasis-possible right lower lobe pneumonia  3. Cirrhosis with associated ascites  4. A. fib with RVR  5. Coagulase-negative staph in 1 out of 2 sets of blood cultures-likely a contaminant  6. Protein calorie malnutrition    Recommendations:   · The patient underwent a paracentesis today and the fluid studies are pending. · There is no evidence of pneumonia at this point in time and the right sided changes are related to the effusion. · Continue intravenous antimicrobial therapy with Rocephin for now. · There is no need for the vancomycin at this point and I will discontinue this for now. · We will follow the ascites fluid studies and adjust therapy accordingly    Infection Control Recommendations:   Universal precautions    Discharge Planning:   Estimated Length of IV antimicrobials: To be determined  Patient will need Midline Catheter Insertion/ PICC line Insertion: No  [facility],  [facility],  LTAC: Undetermined  Patient willneed outpatient wound care: No    Medical Decision making / Summary of Stay:   Jojo Wilson is a 77y.o.-year-old male nursing home resident presented to hospital with dizziness no palpitations and not feeling well associated with mild diffuse abdominal discomfort was found to have A. fib with rapid ventricular response. Poor historian  He was hypotensive initially received fluid bolus. Initial WBC was elevated at 17, renal function normal, lactic acid 2.7 and normal, C-reactive protein 99, urinalysis unremarkable, procalcitonin 10.59, amylase and lipase unremarkable  9/19/2020 CT  abdomen suggestive of liver cirrhosis and stomach and bladder wall thickening.   Chest x-ray showed right lower lobe airspace opacity. 2020 CT chest showed no evidence of pulmonary embolism, small to moderate right pleural effusion with trace left effusion and bibasilar atelectasis, mild ascites. Current evaluation:2020    BP (!) 108/56   Pulse 107   Temp 97.6 °F (36.4 °C) (Oral)   Resp 16   Ht 5' 10\" (1.778 m)   Wt 116 lb 4.8 oz (52.8 kg)   SpO2 95%   BMI 16.69 kg/m²     Temperature Range: Temp: 97.6 °F (36.4 °C) Temp  Av °F (36.7 °C)  Min: 97.3 °F (36.3 °C)  Max: 98.6 °F (37 °C)  The patient is seen and evaluated at bedside he is awake and alert and does report some discomfort. He reports that he is in pain and reports pain in the right lower extremity. No abdominal pain nausea vomiting or diarrhea. Has a Elkins catheter in place her urinary symptoms cannot be assessed. Review of Systems   Constitutional: Positive for appetite change. Respiratory: Negative. Cardiovascular: Positive for leg swelling. Gastrointestinal: Negative. Genitourinary: Negative. Musculoskeletal: Negative. Skin: Positive for wound. Neurological: Negative. Psychiatric/Behavioral: Negative. Physical Examination :     Physical Exam  Constitutional:       Appearance: He is cachectic. He is ill-appearing. HENT:      Head: Normocephalic and atraumatic. Neck:      Musculoskeletal: Normal range of motion and neck supple. Cardiovascular:      Rate and Rhythm: Normal rate. Heart sounds: Normal heart sounds. No friction rub. No gallop. Pulmonary:      Effort: Pulmonary effort is normal.      Breath sounds: Normal breath sounds. No wheezing. Abdominal:      General: Bowel sounds are normal.      Palpations: Abdomen is soft. There is no mass. Tenderness: There is no abdominal tenderness. Musculoskeletal: Normal range of motion. Right lower leg: Edema present. Comments: Left above-the-knee amputation   Lymphadenopathy:      Cervical: No cervical adenopathy.    Skin: Findings: Rash present. Comments: There is some dermatitis in the right lower extremity and foot   Neurological:      Mental Status: He is alert and oriented to person, place, and time. Laboratory data:   I have independently reviewed the followinglabs:  CBC with Differential:   Recent Labs     09/19/20  1146 09/20/20  0318 09/20/20  1817 09/21/20  0706   WBC 17.1* 12.0*  --  14.0*   HGB 7.6* 7.0* 8.9* 8.7*   HCT 26.7* 24.9* 29.7* 29.3*   * 359  --  334   LYMPHOPCT 8*  --   --  13*   MONOPCT 5  --   --  5     BMP:   Recent Labs     09/19/20  1146 09/20/20  0318 09/21/20  0706   * 133* 129*   K 3.8 3.9 3.4*    106 100   CO2 20 18* 20   BUN 17 14 12   CREATININE 0.58* 0.50* 0.48*   MG 1.8 1.7  --      Hepatic Function Panel:   Recent Labs     09/20/20  0318   PROT 4.0*   LABALBU 1.4*   BILITOT <0.10*   ALKPHOS 149*   ALT 10   AST 12         Lab Results   Component Value Date    PROCAL 10.59 09/19/2020     Lab Results   Component Value Date    CRP 99.0 09/19/2020     Lab Results   Component Value Date    SEDRATE 12 09/19/2020         No results found for: DDIMER  Lab Results   Component Value Date    FERRITIN 79 09/20/2020     No results found for: LDH  No results found for: FIBRINOGEN    No results found for requested labs within last 30 days. No results found for: COVID19    No results for input(s): [facility] in the last 72 hours. Imaging Studies:   CTA OF THE CHEST 9/19/2020 12:27 pm   Impression    1.  No evidence of pulmonary embolism.         2.  Emphysematous changes.         3. small to moderate right pleural effusion with trace left effusion and    basilar atelectasis.         4.  Mild ascites upper abdomen.       CT OF THE ABDOMEN AND PELVIS WITH CONTRAST 9/19/2020 12:27 pm   Impression    1.  Moderate right and small left pleural effusions with compressive    atelectasis at the lung bases in this patient with emphysematous changes.         2.  Prominent gastric fold thickening most compatible with gastritis.         3.  Liver appears slightly lobular.  Additionally, abdominal ascites is    noted.  The constellation of findings are consistent with cirrhosis.         4.  Enlarged prostate.  Bladder wall appears thickened although bladder is    suboptimally distended.         5.  Atherosclerotic disease.         6.  No bowel obstruction or urinary obstruction although gallstones are    present.  No evidence of appendicitis.             Cultures:     Culture, Blood 1 [[id number]]  (Abnormal)  Collected: 09/19/20 1155    Order Status: Completed  Specimen: Blood  Updated: 09/21/20 0728     Specimen Description  . BLOOD     Special Requests  NOT REPORTED     Culture  POSITIVE Blood Culture Results called to and read back by: 0850 09/20/2020 MICHAEL Proctor        DIRECT GRAM STAIN FROM BOTTLE: GRAM POSITIVE COCCI IN CLUSTERS      Coagulase negative Staphylococcus species detected by PCRAbnormal        STAPHYLOCOCCUS SPECIES, COAGULASE NEGATIVE A single positive blood culture of coagulase negative Staphylocci, diptheroids, micrococci, Cutibacterium, viridans Streptocci, Bacillus, or Lactobacillus species should be interpreted with caution and viewed as a likely skin contaminant. Abnormal      Body Fluid Culture [[id number]]      Order Status: Sent  Specimen: Body Fluid from Ascitic Fluid     Gram stain [[id number]]      Order Status: Sent  Specimen: Ascitic Fluid     Culture, Blood 1 [[id number]]   Collected: 09/19/20 1155    Order Status: Completed  Specimen: Blood  Updated: 09/20/20 1517     Specimen Description  . BLOOD     Special Requests  NOT REPORTED     Culture  NO GROWTH 1 DAY          Medications:      midodrine  10 mg Oral TID    [Held by provider] furosemide  20 mg Intravenous Daily    vancomycin  1,000 mg Intravenous Q12H    vancomycin (VANCOCIN) intermittent dosing (placeholder)   Other RX Placeholder    ferrous sulfate  325 mg Oral TID WC    escitalopram  20 mg Oral Daily    mirtazapine  15 mg Oral Nightly    magnesium oxide  400 mg Oral Daily    gabapentin  300 mg Oral TID    [START ON 10/5/2020] vitamin D  50,000 Units Oral Q30 Days    pantoprazole  40 mg Oral BID AC    sodium chloride flush  10 mL Intravenous 2 times per day    digoxin  125 mcg Oral Daily    [Held by provider] metoprolol tartrate  12.5 mg Oral BID    apixaban  5 mg Oral BID    cefTRIAXone (ROCEPHIN) IV  1 g Intravenous Q24H           [facility]  Belén Mattson MD  Perfect [employer] messaging  OFFICE: [phone]      Electronically signed by Belén Mattson MD on 9/21/2020 at 10:36 AM  Thank you for allowing us to participate in the care of this patient. Please call with questions. This note iscreated with the assistance of a speech recognition program.  While intending to generate a document that actually reflects the content of the visit, the document can still have some errors including those of syntax andsound a like substitutions which may escape proof reading. In such instances, actual meaning can be extrapolated by contextual diversion.

## 2020-09-21 NOTE — PROGRESS NOTES
cardioembolic source, history of alcoholism, hypertension and history of left above-knee amputation per the patient secondary to cancer, questionable's malignant melanoma by looking in the chart? .  Patient is a nursing home resident, he report that he did feel dizzy and lightheaded along with palpitations, nausea and generalized weakness consequently brought to ER by EMS, and ER was found to be in A. fib with RVR with elevation in BNP and initially hypotensive with elevation of white cell count and thrombocytosis. Patient heart rate responded to 1 dose of Cardizem bolus of 10 mg. Patient also received 1 bolus of fluids. Chest x-ray showed bilateral pleural effusion more to the right. It is not clear if patient was actually taking Eliquis at the nursing facility. During my encounter with the patient he denied any chest pain, sob, nausea, vomiting, fever or chills. His dizziness and lightheadedness totally resolved after his heart rate came down. Patient does not have a clear source of sepsis. His CT chest and abdomen are not impressive except for liver cirrhosis with present ascites along with some stomach wall thickening concerning for gastritis. Patient is admitted for further management    Review of Systems:     Review of Systems   Constitutional: Positive for activity change, appetite change and fatigue. Negative for chills, diaphoresis and fever. HENT: Negative for congestion. Eyes: Negative for visual disturbance. Respiratory: Negative for cough, chest tightness, shortness of breath and wheezing. Cardiovascular: Positive for leg swelling. Negative for chest pain and palpitations. Gastrointestinal: Positive for diarrhea. Negative for abdominal pain, blood in stool, constipation, nausea and vomiting. Genitourinary: Negative for difficulty urinating. Musculoskeletal: Positive for arthralgias. Neurological: Positive for light-headedness.  Negative for dizziness, weakness, numbness and headaches. All other systems reviewed and are negative. Medications: Allergies:  No Known Allergies    Current Meds:   Scheduled Meds:    midodrine  10 mg Oral TID    [Held by provider] furosemide  20 mg Intravenous Daily    vancomycin  1,000 mg Intravenous Q12H    vancomycin (VANCOCIN) intermittent dosing (placeholder)   Other RX Placeholder    ferrous sulfate  325 mg Oral TID WC    escitalopram  20 mg Oral Daily    mirtazapine  15 mg Oral Nightly    magnesium oxide  400 mg Oral Daily    gabapentin  300 mg Oral TID    [START ON 10/5/2020] vitamin D  50,000 Units Oral Q30 Days    pantoprazole  40 mg Oral BID AC    sodium chloride flush  10 mL Intravenous 2 times per day    digoxin  125 mcg Oral Daily    [Held by provider] metoprolol tartrate  12.5 mg Oral BID    apixaban  5 mg Oral BID    cefTRIAXone (ROCEPHIN) IV  1 g Intravenous Q24H     Continuous Infusions:    sodium chloride 150 mL/hr at 20 1900    dilTIAZem (CARDIZEM) 125 mg in dextrose 5% 125 mL infusion 15 mg/hr (20 2328)     PRN Meds: oxyCODONE **OR** oxyCODONE, fentanNYL, sodium chloride flush, sodium chloride flush    Data:     Past Medical History:   has a past medical history of History of alcoholism ([address] and Hypertension. Social History:   reports that he has been smoking. He has been smoking about 0.50 packs per day. He has never used smokeless tobacco. He reports current alcohol use. He reports that he does not use drugs. Family History: History reviewed. No pertinent family history. Vitals:  BP (!) 88/55   Pulse 128   Temp 98.4 °F (36.9 °C) (Oral)   Resp 16   Ht 5' 10\" (1.778 m)   Wt 116 lb 4.8 oz (52.8 kg)   SpO2 95%   BMI 16.69 kg/m²   Temp (24hrs), Av °F (36.7 °C), Min:97.3 °F (36.3 °C), Max:98.6 °F (37 °C)    No results for input(s): POCGLU in the last 72 hours. I/O (24Hr):     Intake/Output Summary (Last 24 hours) at 2020 0809  Last data filed at 2020 1723  Gross per 24 detected by PCR    CULTURE (A) 09/19/2020 11:55 AM     STAPHYLOCOCCUS SPECIES, COAGULASE NEGATIVE A single positive blood culture of coagulase negative Staphylocci, diptheroids, micrococci, Cutibacterium, viridans Streptocci, Bacillus, or Lactobacillus species should be interpreted with caution and viewed as a likely skin contaminant. Radiology:  Ct Abdomen Pelvis W Iv Contrast    Result Date: 9/19/2020  1. Moderate right and small left pleural effusions with compressive atelectasis at the lung bases in this patient with emphysematous changes. 2.  Prominent gastric fold thickening most compatible with gastritis. 3.  Liver appears slightly lobular. Additionally, abdominal ascites is noted. The constellation of findings are consistent with cirrhosis. 4.  Enlarged prostate. Bladder wall appears thickened although bladder is suboptimally distended. 5.  Atherosclerotic disease. 6.  No bowel obstruction or urinary obstruction although gallstones are present. No evidence of appendicitis. Xr Chest Portable    Result Date: 9/19/2020  1. Right lower lobe airspace opacity potentially due to atelectasis, pneumonia, or aspiration. 2. Bony demineralization partially limits evaluation for fractures. Ct Chest Pulmonary Embolism W Contrast    Result Date: 9/19/2020  1. No evidence of pulmonary embolism. 2.  Emphysematous changes. 3. small to moderate right pleural effusion with trace left effusion and basilar atelectasis. 4.  Mild ascites upper abdomen. Physical Examination:        Physical Exam  Vitals signs and nursing note reviewed. Constitutional:       General: He is not in acute distress. Appearance: He is well-developed. He is not diaphoretic. Comments: Underweight and malnourished, poor personal hygiene   HENT:      Head: Normocephalic and atraumatic. Right Ear: Hearing normal.      Left Ear: Hearing normal.      Nose: Nose normal. No rhinorrhea.    Eyes:      General: Lids are normal.      Extraocular Movements:      Right eye: Normal extraocular motion. Left eye: Normal extraocular motion. Conjunctiva/sclera: Conjunctivae normal.      Right eye: Right conjunctiva is not injected. Left eye: Left conjunctiva is not injected. Pupils: Pupils are equal, round, and reactive to light. Pupils are equal.      Right eye: Pupil is reactive. Left eye: Pupil is reactive. Neck:      Musculoskeletal: Neck supple. Thyroid: No thyromegaly. Vascular: No carotid bruit. Trachea: Trachea and phonation normal. No tracheal deviation. Cardiovascular:      Rate and Rhythm: Normal rate. Rhythm irregular. Pulses: Normal pulses. Heart sounds: Normal heart sounds. No murmur. Pulmonary:      Effort: Pulmonary effort is normal. No respiratory distress. Breath sounds: No stridor. Examination of the right-lower field reveals decreased breath sounds. Decreased breath sounds present. Abdominal:      General: Bowel sounds are normal. There is no distension. Palpations: Abdomen is soft. There is no mass. Tenderness: There is no abdominal tenderness. There is no guarding. Musculoskeletal:         General: No tenderness. Right lower leg: Edema present. Comments: Left above-knee amputation   Skin:     General: Skin is warm and dry. Findings: No erythema, lesion or rash.         Assessment:        Hospital Problems           Last Modified POA    * (Principal) Atrial fibrillation with RVR ([address] 9/19/2020 Yes    Cirrhosis of liver with ascites ([address] 9/19/2020 Yes    Microcytic anemia 9/19/2020 Yes    Thrombocytosis ([address] 9/19/2020 Yes    Sepsis ([address] 9/20/2020 Yes    Elevated brain natriuretic peptide (BNP) level 9/19/2020 Yes    Severe malnutrition ([address] 9/19/2020 Yes    Hyponatremia 9/19/2020 Yes    History of alcoholism ([address] 9/19/2020 Yes    Chronic atrial fibrillation 9/19/2020 Yes    Leukocytosis 9/19/2020 Yes    Sarcopenia 9/19/2020 Yes    Malignant melanoma of lower extremity, including hip ([facility] [facility] 9/20/2020 Yes    Overview Signed 9/20/2020  3:34 PM by Daymon Aschoff, APRN - CNP     Hx of left foot with left groin lymph node positive for melanoma         Dizziness 9/20/2020 Yes          Plan:          Sepsis: Patient had leukocytosis and elevated heart rate initially along with thrombocytosis: inflammatory markers elevated, no clear source yet  On Vanco and Rocephin per ID, follow-up recommendations. Continue fluids    Chronic A. fib presented with A. fib with RVR:  HR still in 110-120,blood pressure on the lower side this morning, hold Cardizem drip and give IV digoxin to further recommendations from cardiology. Echocardiogram this am , cardiology is ok with holding AC till GI evaluation    Hypotension with some swelling/ oliguria: likely 3rd spacing, will give albumin with lasix      Microcytic anemia no clear baseline, last hemoglobin in the system 2 years ago was around 9  we will order fecal occult blood and monitor hemoglobin and transfuse if less than 7..  Order FOBT, noted patient on iron supplement as home medication, will resume  Hb drropped since admission, Tx 1 UPRBCs today 9/20. Appreciate GI recommendations, likely will need EGD for evaluation of anemia     Elevated BNP/bilateral pleural effusion: No known history of heart failure, echo is pending.     Liver cirrhosis with ascites on CT abdomen: History of alcoholism, no formal diagnosis in the past, INR is 1.1.   Discussed with GI plan for paracentesis for fluid studies for any possible infection / rule out malignancy     Severe malnutrition/cytopenia: Dietitian consult added, start oral supplementation     Hypophosphatemia : replace     Mild hyponatremia: Continue to monitor daily.     PT/OT     RLE wounds: add wound care , pain control, see ordres     Discussed with the patient, nurse and with GI NP    Dorothy Taylor MD  9/21/2020  8:09 AM

## 2020-09-21 NOTE — PLAN OF CARE
Problem: Skin Integrity:  Goal: Will show no infection signs and symptoms  Description: Will show no infection signs and symptoms  9/21/2020 1240 by Sergei Elizabeth RN  Outcome: Ongoing  Note: Skin assessment complete. Pt. Monitored for s/s of infection. Turn and reposition in bed, pressure reducing mattress, monitoring skin with every assessment and prn. Pressure ulcer preventions being practiced. Will continue to monitor. Problem: Cardiac:  Goal: Ability to maintain vital signs within normal range will improve  Description: Ability to maintain vital signs within normal range will improve  Outcome: Ongoing     Problem: Physical Regulation:  Goal: Complications related to the disease process, condition or treatment will be avoided or minimized  Description: Complications related to the disease process, condition or treatment will be avoided or minimized  Outcome: Ongoing     Problem: Falls - Risk of:  Goal: Will remain free from falls  Description: Will remain free from falls  9/21/2020 1240 by Sergei Elizabeth RN  Outcome: Ongoing  Note: Pt fall risk, fall band present, falling star, safety alarm activated and in use as needed. Bed in lowest position, call light within reach. Hourly rounding performed. Pt encouraged to use call light. See Marlo Youssef fall risk assessment. Patient offered toileting assistance during rounding. Hourly rounds performed.

## 2020-09-21 NOTE — PROGRESS NOTES
Pt very agitated and refusing meals. Pt yells \"leave me alone and let me sleep\" and then promptly closes eyes and refuses to respond. When daughter called, pt took phone and talked to her but continues to tell RN to leave him alone.

## 2020-09-21 NOTE — CARE COORDINATION
Social work: Met with patient at bedside to discuss dc plan- return to [facility]. Regarding placement, pt states he \"isn't having anything to do with that. \"  Writer contact Javan Marinelli RN manager to confirm if they can meet pt's needs. Jacinta Greenberg states she will complete on site visit today or tomorrow to confirm return. Patient is non-ambulatory at baseline, but is able to transfer from bed to wheelchair. Patient has h/o non-compliance at [facility], not allowing for brief changes, refusing showers, etc.  Jacinta Greenberg wants to speak with patient and inform him he needs to be allow facility to provide his care or they won't be able to meet his needs in the AL setting. Patient is having swallow study today, Jacinta Greenberg states if patient fails this or requires special diet(thickened, etc) patient will need SNF as they can only accommodate general diet.    SHANNON faxed updates to [address] [place] [address](fax: [phone])

## 2020-09-21 NOTE — PROGRESS NOTES
Patient tolerated paracentesis without distress. 350   ml of clear, slightly yellow fluid removed. Dry dressing to site. Patient returned to room. Nurse updated.

## 2020-09-21 NOTE — PROGRESS NOTES
Physical Therapy  DATE: 2020    NAME: Bola Cruz  MRN: [id number]   : 1945    Patient not seen this date for Physical Therapy due to:  [] Blood transfusion in progress  [x] Cancel by RN  (Hold per Elen RN due to low BP)  [] Hemodialysis  []  Refusal by Patient   [] Spine Precautions   [] Strict Bedrest  [] Surgery  [] Testing      [] Other        [] PT being discontinued at this time. Patient independent. No further needs. [] PT being discontinued at this time as the patient has been transferred to hospice care. No further needs.     Mena Vera, PT   09:23

## 2020-09-21 NOTE — PROGRESS NOTES
[facility]    Gastroenterology Daily Progress Note      Patient:   Brenda Whiting   :    1945   Facility:   [facility]  Date:     2020  Consultant:   Cheryl Mott, CNP      SUBJECTIVE  77 y.o. male admitted 2020 with Atrial fibrillation with RVR ([facility] [address] [I48.91]  Atrial fibrillation with RVR ([facility] [address] [I48.91] and seen for anemia and possible cirrhosis. The pt was seen and examined. He is agitated this morning and yelling at the staff. He denies abdominal pain and nausea. No BM overnight. hgb 8.7, liver w/u pending, Scheduled for paracentesis today.          OBJECTIVE  Scheduled Meds:   midodrine  10 mg Oral TID    furosemide  20 mg Intravenous Daily    vancomycin  1,000 mg Intravenous Q12H    vancomycin (VANCOCIN) intermittent dosing (placeholder)   Other RX Placeholder    midodrine  10 mg Oral Once    ferrous sulfate  325 mg Oral TID WC    escitalopram  20 mg Oral Daily    mirtazapine  15 mg Oral Nightly    magnesium oxide  400 mg Oral Daily    gabapentin  300 mg Oral TID    [START ON 10/5/2020] vitamin D  50,000 Units Oral Q30 Days    pantoprazole  40 mg Oral BID AC    sodium chloride flush  10 mL Intravenous 2 times per day    digoxin  125 mcg Oral Daily    [Held by provider] metoprolol tartrate  12.5 mg Oral BID    apixaban  5 mg Oral BID    cefTRIAXone (ROCEPHIN) IV  1 g Intravenous Q24H       Vital Signs:  BP (!) 95/41   Pulse 56   Temp 98.2 °F (36.8 °C) (Oral)   Resp 16   Ht 5' 10\" (1.778 m)   Wt 116 lb 4.8 oz (52.8 kg)   SpO2 95%   BMI 16.69 kg/m²      Physical Exam:     General Appearance: alert and oriented to person, place and time, well-developed and well-nourished, agitated  Skin: warm and dry, no rash or erythema  Head: normocephalic and atraumatic  Eyes: pupils equal, round, and reactive to light, extraocular eye movements intact, conjunctivae normal  ENT: hearing grossly normal bilaterally  Neck: neck supple and non tender without mass, no thyromegaly or thyroid nodules, no cervical lymphadenopathy   Pulmonary/Chest: clear to auscultation bilaterally- no wheezes, rales or rhonchi, normal air movement, no respiratory distress  Cardiovascular:bradycardic rate, regular rhythm, normal S1 and S2, no murmurs, rubs, clicks or gallops, distal pulses intact, no carotid bruits  Abdomen: soft, non-tender, minimally-distended, normal bowel sounds, no masses or organomegaly  Extremities: no cyanosis, clubbing or edema  Musculoskeletal: normal range of motion, no joint swelling, deformity or tenderness left aka  Neurologic: no cranial nerve deficit and muscle strength normal    Lab and Imaging Review     CBC  Recent Labs     09/19/20  1146 09/20/20  0318 09/20/20  1817 09/21/20  0706   WBC 17.1* 12.0*  --  14.0*   HGB 7.6* 7.0* 8.9* 8.7*   HCT 26.7* 24.9* 29.7* 29.3*   MCV 76.5* 78.5*  --  77.5*   * 359  --  334       BMP  Recent Labs     09/19/20  1146 09/20/20  0318 09/21/20  0706   * 133* 129*   K 3.8 3.9 3.4*    106 100   CO2 20 18* 20   BUN 17 14 12   CREATININE 0.58* 0.50* 0.48*   GLUCOSE 82 98 83   CALCIUM 7.3* 6.5* 6.5*       LFTS  Recent Labs     09/20/20  0318   ALKPHOS 149*   ALT 10   AST 12   PROT 4.0*   BILITOT <0.10*   LABALBU 1.4*       AMYLASE/LIPASE/AMMONIA  Recent Labs     09/19/20  1819   AMYLASE 41   LIPASE 5*   Results for Pooja Lara (MRN [id number]) as of 9/21/2020 09:41   Ref. Range 9/20/2020 18:17   A-1 Antitrypsin Latest Ref Range: 90 - 200 mg/dL 167   Results for Pooja Lara (MRN [id number]) as of 9/21/2020 09:41   Ref. Range 9/20/2020 18:17   Ceruloplasmin Latest Ref Range: 15 - 30 mg/dL 14 (L)   Results for Pooja Lara (MRN [id number]) as of 9/21/2020 09:41   Ref.  Range 9/20/2020 18:17   IgA Latest Ref Range: 70 - 400 mg/dL 350   Hep A IgM Latest Ref Range: NONREACTIVE  NONREACTIVE   Hepatitis B Surface Ag Latest Ref Range: NONREACTIVE  NONREACTIVE   Hepatitis C Ab Latest Ref Range: NONREACTIVE NONREACTIVE   Hep B Core Ab, IgM Latest Ref Range: NONREACTIVE  NONREACTIVE   Results for Dasia Erwin (MRN [id number]) as of 9/21/2020 09:41   Ref. Range 9/20/2020 18:17   AFP (Alpha Fetoprotein) Latest Ref Range: <8.4 ug/L 1.6       PT/INR  Recent Labs     09/19/20  1146 09/20/20  0318   PROTIME 13.7 14.6*   INR 1.1 1.2         ANEMIA STUDIES  Recent Labs     09/20/20  1817   LABIRON 27   TIBC 98*   FERRITIN 79   [id number] >2000*   FOLATE 2.0*     Ct Abdomen Pelvis W Iv Contrast     Result Date: 9/19/2020  EXAMINATION: CT OF THE ABDOMEN AND PELVIS WITH CONTRAST 9/19/2020 12:27 pm  Initial FINDINGS: Lower Chest: Mild to moderate right pleural effusion. Small left pleural effusion. Emphysematous changes in the lungs. Compressive atelectasis both lung bases. Coronary artery calcification. Organs: Liver contains several subcentimeter hypodensities near the dome right lobe consistent with cysts. Liver is slightly lobular in contour. The spleen, pancreas, and adrenals are unremarkable. Gallstones are noted within the gallbladder. Kidneys demonstrate no evidence of hydronephrosis or nephrolithiasis. GI/Bowel: Small amount of upper abdominal ascites and haziness in the mesentery is noted. No free air or bowel obstruction is noted. Gastric folds are prominent and thickened consistent with gastritis. Pelvis: No evidence of appendicitis. Small amount of pelvic fluid. Bladder is decompressed limiting assessment but bladder wall appears prominent. Prostate is enlarged. Bladder wall thickening may be related to hypertrophy. No inguinal adenopathy is noted. Atherosclerotic calcification of the iliac vessels is noted, moderately significant. Peritoneum/Retroperitoneum: No aortic aneurysm. Diffuse atherosclerotic calcification of the aorta is noted, mild to moderate. An Bones/Soft Tissues: No acute osseous or soft tissue abnormality.      1.   Moderate right and small left pleural effusions with compressive atelectasis at the lung bases in this patient with emphysematous changes. 2.  Prominent gastric fold thickening most compatible with gastritis. 3.  Liver appears slightly lobular. Additionally, abdominal ascites is noted. The constellation of findings are consistent with cirrhosis. 4.  Enlarged prostate. Bladder wall appears thickened although bladder is suboptimally distended. 5.  Atherosclerotic disease. 6.  No bowel obstruction or urinary obstruction although gallstones are present. No evidence of appendicitis.        ASSESSMENT/PLAN:  1. Anemia, no overt bleeding iron at 26 with normal saturation  -trend hh and keep hgb>7  -will discuss endoscopy with md    2. Ascites, Possible cirrhosis; does have hx of alcohol abuse  -scheduled for paracentesis today, labs ordered to r/o SBP and malignancy given history of malignant melanoma   -liver disease w/u pending        This plan was formulated in collaboration with Dr. Sabino Chung .     Electronically signed by: GEOFF Bloom CNP on 9/21/2020 at 7:30 AM

## 2020-09-21 NOTE — PROGRESS NOTES
Occupational Therapy  DATE: 2020    NAME: Sherine Holman  MRN: [id number]   : 1945    Patient not seen this date for Occupational Therapy due to:  [] Blood transfusion in progress  [x] Cancel by RN- hold eval per RN Elen as pt's heart rate and BP low; continue to follow  [] Hemodialysis  []  Refusal by Patient   [] Spine Precautions   [] Strict Bedrest  [] Surgery  [] Testing      [] Other        [] PT being discontinued at this time. Patient independent. No further needs. [] PT being discontinued at this time as the patient has been transferred to hospice care. No further needs.     Griselda Spangler, OT

## 2020-09-21 NOTE — PROGRESS NOTES
[facility])    Progress Note    2020 4:28 PM      Subjective:  Mr. Roya Nicole  Has no complaints, has a.fib with RVR             LABS:     CBC:   Recent Labs     20  1146 20  0318 20  1817 20  0706   WBC 17.1* 12.0*  --  14.0*   HGB 7.6* 7.0* 8.9* 8.7*   HCT 26.7* 24.9* 29.7* 29.3*   MCV 76.5* 78.5*  --  77.5*   * 359  --  334     BMP:   Recent Labs     20  1146 20  0318 20  0706   * 133* 129*   K 3.8 3.9 3.4*    106 100   CO2 20 18* 20   PHOS  --  1.9*  --    BUN 17 14 12   CREATININE 0.58* 0.50* 0.48*     PT/INR:   Recent Labs     20  1146 20  0318   PROTIME 13.7 14.6*   INR 1.1 1.2     APTT:   Recent Labs     20  114   APTT 34.2*     MAG:   Recent Labs     20  1146 20  0318   MG 1.8 1.7     D Dimer: No results for input(s): DDIMER in the last 72 hours. Troponin T   Recent Labs     20  1146 20  1342   TROPONINT NOT REPORTED NOT REPORTED     Pro-BNP No results for input(s): BNP in the last 72 hours. Pulse Ox:  SpO2  Av.7 %  Min: 95 %  Max: 98 %  Supplemental O2:       Current Meds: midodrine (PROAMATINE) tablet 10 mg, TID  oxyCODONE (ROXICODONE) immediate release tablet 5 mg, Q8H PRN  potassium chloride (KLOR-CON M) extended release tablet 40 mEq, PRN    Or  potassium bicarb-citric acid (EFFER-K) effervescent tablet 40 mEq, PRN    Or  potassium chloride 10 mEq/100 mL IVPB (Peripheral Line), PRN  0.9 % sodium chloride infusion, Continuous  albumin human 25 % IV solution 25 g, Once  sodium chloride flush 0.9 % injection 10 mL, PRN  ferrous sulfate (FE TABS 325) EC tablet 325 mg, TID WC  escitalopram (LEXAPRO) tablet 20 mg, Daily  mirtazapine (REMERON) tablet 15 mg, Nightly  magnesium oxide (MAG-OX) tablet 400 mg, Daily  gabapentin (NEURONTIN) capsule 300 mg, TID  [START ON 10/5/2020] vitamin D (ERGOCALCIFEROL) capsule 50,000 Units, Q30 Days  pantoprazole (PROTONIX) tablet 40 mg, BID AC  sodium chloride flush 0.9 % injection 10 mL, 2 times per day  sodium chloride flush 0.9 % injection 10 mL, PRN  digoxin (LANOXIN) tablet 125 mcg, Daily  [Held by provider] metoprolol tartrate (LOPRESSOR) tablet 12.5 mg, BID  apixaban (ELIQUIS) tablet 5 mg, BID  cefTRIAXone (ROCEPHIN) 1 g IVPB in 50 mL D5W minibag, Q24H  dilTIAZem 125 mg in dextrose 5 % 125 mL infusion, Continuous      Continuous Infusions:    sodium chloride 75 mL/hr at 09/21/20 1523    dilTIAZem (CARDIZEM) 125 mg in dextrose 5% 125 mL infusion 5 mg/hr (09/21/20 1035)          VITAL SIGNS:    /71   Pulse 138   Temp 97.6 °F (36.4 °C) (Oral)   Resp 16   Ht 5' 10\" (1.778 m)   Wt 116 lb 4.8 oz (52.8 kg)   SpO2 96%   BMI 16.69 kg/m²         Admit Weight:  127 lb (57.6 kg)    Last 3 weights: Wt Readings from Last 3 Encounters:   09/21/20 116 lb 4.8 oz (52.8 kg)   08/27/17 (S) 127 lb (57.6 kg)       BMI: Body mass index is 16.69 kg/m². INPUT/OUTPUT:          Intake/Output Summary (Last 24 hours) at 9/21/2020 1628  Last data filed at 9/20/2020 1723  Gross per 24 hour   Intake --   Output 950 ml   Net -950 ml       EKG: tele a.fib with rvr    EXAM:     General appearance:frail  Lungs: Clear to Auscultation bilaterally  Heart: irregular  Abdomen: Soft nontender  Extremities: No edema      Principal Problem:    Atrial fibrillation with RVR (HCC)  Active Problems:    Elevated brain natriuretic peptide (BNP) level    History of alcoholism (HCC)    Cirrhosis of liver with ascites (HCC)    Chronic atrial fibrillation    Leukocytosis    Anemia    Thrombocytosis (HCC)    Severe malnutrition (HCC)    Sarcopenia    Hyponatremia    Sepsis (HCC)    Malignant melanoma of lower extremity, including hip (HCC)    Dizziness  Resolved Problems:    * No resolved hospital problems.  *      ASSESSMENT / PLAN    Add cardizem oral  Check dig level may need to increase dig dose  Echo pending    Electronically signed by Candice Marshall MD on 9/21/2020 at 4:28 PM

## 2020-09-21 NOTE — PROCEDURES
INSTRUMENTAL SWALLOW REPORT  MODIFIED BARIUM SWALLOW    NAME: Marco Sanderson   : 1945  MRN: [id number]       Date of Eval: 2020        Radiologist: Dr. ESPINO [facility]       Past Medical History:  has a past medical history of History of alcoholism ([address] and Hypertension. Past Surgical History:  has a past surgical history that includes hernia repair and Hydrocele surgery. Current Diet Solid Consistency: Regular  Current Diet Liquid Consistency: Thin       Type of Study: Initial MBS       Patient Complaints/Reason for Referral:  Marco Sanderson was referred for a MBS to assess the efficiency of his swallow function, assess for aspiration, and to make recommendations regarding safe dietary consistencies, effective compensatory strategies, and safe eating environment. Behavior/Cognition/Vision/Hearing:  Behavior/Cognition: Alert; Cooperative  Vision: Within Functional Limits  Hearing: Within functional limits    Impressions:  Pt. With + trace penetration, no aspiration, no cough with thin liquid.  + residual in laryngeal vestibule. No penetration, no aspiration with nectar thick liquid. No penetration, no aspiration with puree. No penetration, no aspiration with soft solid. Mod vallecula residual noted, decreased with regular solid bolus. No penetration, no aspiration with regular solid. Premature spill noted throughout with all consistencies. Mild extended mastication noted with soft solids. Moderate extended mastication noted with regular solids. ST to recommend Dysphagia soft and bite/sized (Dysphagia III) with nectar thick liquid. If s/s of aspiration occur, d/c all PO and recommend NPO. Results and recommendations reported to RN. Patient Degrees: upright in chair  Consistencies Administered: Dysphagia Soft and Bite-Sized (Dysphagia III); Reg solid; Dysphagia Pureed (Dysphagia I); Thin straw; Thin teaspoon;Nectar straw;Nectar  teaspoon    Recommended Diet:  Solid consistency: Dysphagia Soft and Bite-Sized (Dysphagia III)  Liquid consistency: Mildly Thick (Nectar)    Safe Swallow Protocol:     Compensatory Swallowing Strategies: Upright as possible for all oral intake;Eat/Feed slowly; Small bites/sips    Recommendations/Treatment  Requires SLP Intervention: Yes   D/C Recommendations: Further therapy recommended at discharge. Recommended Exercises:    Therapeutic Interventions: Oral motor exercises; Marlin;Diet tolerance monitoring;Patient/Family education    Education: Images and recommendations were reviewed with pt. And RN following this exam.   Patient Education: yes  Patient Education Response: Verbalizes understanding    Prognosis  Prognosis for safe diet advancement: fair  Duration/Frequency of Treatment  Duration/Frequency of Treatment: 3-5 x week      Goals:    Long Term: To Maximize safety with intake, optimize nutrition/hydration and minimize risk for aspiration. Short Term:     Goal 1: Pt. will complete OMEX for dysphagia 10-20 x per session. Oral Preparation / Oral Phase  Oral Phase: Impaired  Oral Phase: Premature spill noted throughout with all consistencies. Mild extended mastication noted with soft solids. Moderate extended mastication noted with regular solids. Pharyngeal Phase  Pharyngeal Phase: Impaired   Pharyngeal:     -Thin liquid:  + trace penetration, no aspiration. + residual in laryngeal vestibule. -Nectar thick liquid:  no penetration, no aspiration.      -Puree:  no penetration, no aspiration.      -Soft solid:  no penetration, no aspiration. Mod vallecula residual noted, decreased with regular solid bolus.      -Regular solid:  no penetration, no aspiration. Esophageal Phase  Esophageal Screen: WFL    Pain   Patient Currently in Pain: No  Pain Level: 7      Therapy Time:   Individual Concurrent Group Co-treatment   Time In 1500         Time Out 1510         Minutes Skip MOORE CCC-SLP, 9/21/2020, 3:29 PM

## 2020-09-21 NOTE — PLAN OF CARE
Problem: Skin Integrity:  Goal: Will show no infection signs and symptoms  Description: Will show no infection signs and symptoms  Outcome: Ongoing  Note:   Q2 turn and PRN    Goal: Absence of new skin breakdown  Description: Absence of new skin breakdown  Outcome: Ongoing     Problem: Falls - Risk of:  Goal: Will remain free from falls  Description: Will remain free from falls  Outcome: Ongoing  Note: Patient is fall risk per fall scale. Hourly rounding performed. Personal belongings and call light within reach. Bed in low position.    Goal: Absence of physical injury  Description: Absence of physical injury  Outcome: Ongoing

## 2020-09-22 PROBLEM — I50.33 ACUTE ON CHRONIC DIASTOLIC HEART FAILURE (HCC): Status: ACTIVE | Noted: 2020-09-22

## 2020-09-22 LAB
ABSOLUTE EOS #: <0.03 K/UL (ref 0–0.44)
ABSOLUTE IMMATURE GRANULOCYTE: 0.17 K/UL (ref 0–0.3)
ABSOLUTE LYMPH #: 1.4 K/UL (ref 1.1–3.7)
ABSOLUTE MONO #: 0.54 K/UL (ref 0.1–1.2)
ANION GAP SERPL CALCULATED.3IONS-SCNC: 7 MMOL/L (ref 9–17)
APPEARANCE FLUID: NORMAL
BASO FLUID: NORMAL %
BASOPHILS # BLD: 0 % (ref 0–2)
BASOPHILS ABSOLUTE: <0.03 K/UL (ref 0–0.2)
BUN BLDV-MCNC: 11 MG/DL (ref 8–23)
BUN/CREAT BLD: 19 (ref 9–20)
CALCIUM SERPL-MCNC: 6.6 MG/DL (ref 8.6–10.4)
CHLORIDE BLD-SCNC: 106 MMOL/L (ref 98–107)
CO2: 19 MMOL/L (ref 20–31)
COLOR FLUID: NORMAL
CREAT SERPL-MCNC: 0.59 MG/DL (ref 0.7–1.2)
DIFFERENTIAL TYPE: ABNORMAL
DIGOXIN DATE LAST DOSE: NORMAL
DIGOXIN DOSE AMOUNT: NORMAL
DIGOXIN DOSE TIME: NORMAL
DIGOXIN LEVEL: 0.9 NG/ML (ref 0.5–2)
EOSINOPHIL FLUID: NORMAL %
EOSINOPHILS RELATIVE PERCENT: 0 % (ref 1–4)
FLUID DIFF COMMENT: NORMAL
GFR AFRICAN AMERICAN: >60 ML/MIN
GFR NON-AFRICAN AMERICAN: >60 ML/MIN
GFR SERPL CREATININE-BSD FRML MDRD: ABNORMAL ML/MIN/{1.73_M2}
GFR SERPL CREATININE-BSD FRML MDRD: ABNORMAL ML/MIN/{1.73_M2}
GLUCOSE BLD-MCNC: 116 MG/DL (ref 70–99)
HCT VFR BLD CALC: 28.4 % (ref 40.7–50.3)
HEMOGLOBIN: 8.1 G/DL (ref 13–17)
IMMATURE GRANULOCYTES: 2 %
LYMPHOCYTES # BLD: 13 % (ref 24–43)
LYMPHOCYTES, BODY FLUID: 20 %
MCH RBC QN AUTO: 22.7 PG (ref 25.2–33.5)
MCHC RBC AUTO-ENTMCNC: 28.5 G/DL (ref 28.4–34.8)
MCV RBC AUTO: 79.6 FL (ref 82.6–102.9)
MONOCYTE, FLUID: NORMAL %
MONOCYTES # BLD: 5 % (ref 3–12)
NEUTROPHIL, FLUID: 67 %
NRBC AUTOMATED: 0 PER 100 WBC
OTHER CELLS FLUID: NORMAL %
PDW BLD-RTO: 18.5 % (ref 11.8–14.4)
PLATELET # BLD: 310 K/UL (ref 138–453)
PLATELET ESTIMATE: ABNORMAL
PMV BLD AUTO: 9.7 FL (ref 8.1–13.5)
POTASSIUM SERPL-SCNC: 3.7 MMOL/L (ref 3.7–5.3)
RBC # BLD: 3.57 M/UL (ref 4.21–5.77)
RBC # BLD: ABNORMAL 10*6/UL
RBC FLUID: <3000 /MM3
SEG NEUTROPHILS: 80 % (ref 36–65)
SEGMENTED NEUTROPHILS ABSOLUTE COUNT: 8.64 K/UL (ref 1.5–8.1)
SMOOTH MUSCLE ANTIBODY: ABNORMAL
SODIUM BLD-SCNC: 132 MMOL/L (ref 135–144)
SPECIMEN TYPE: NORMAL
SURGICAL PATHOLOGY REPORT: NORMAL
WBC # BLD: 10.8 K/UL (ref 3.5–11.3)
WBC # BLD: ABNORMAL 10*3/UL
WBC FLUID: 43 /MM3

## 2020-09-22 PROCEDURE — 99232 SBSQ HOSP IP/OBS MODERATE 35: CPT | Performed by: INTERNAL MEDICINE

## 2020-09-22 PROCEDURE — 6360000002 HC RX W HCPCS: Performed by: FAMILY MEDICINE

## 2020-09-22 PROCEDURE — 6370000000 HC RX 637 (ALT 250 FOR IP): Performed by: FAMILY MEDICINE

## 2020-09-22 PROCEDURE — 6370000000 HC RX 637 (ALT 250 FOR IP): Performed by: INTERNAL MEDICINE

## 2020-09-22 PROCEDURE — 80162 ASSAY OF DIGOXIN TOTAL: CPT

## 2020-09-22 PROCEDURE — APPNB30 APP NON BILLABLE TIME 0-30 MINS: Performed by: NURSE PRACTITIONER

## 2020-09-22 PROCEDURE — APPSS45 APP SPLIT SHARED TIME 31-45 MINUTES: Performed by: NURSE PRACTITIONER

## 2020-09-22 PROCEDURE — 2580000003 HC RX 258: Performed by: INTERNAL MEDICINE

## 2020-09-22 PROCEDURE — 2500000003 HC RX 250 WO HCPCS: Performed by: INTERNAL MEDICINE

## 2020-09-22 PROCEDURE — 51702 INSERT TEMP BLADDER CATH: CPT

## 2020-09-22 PROCEDURE — 80048 BASIC METABOLIC PNL TOTAL CA: CPT

## 2020-09-22 PROCEDURE — 99213 OFFICE O/P EST LOW 20 MIN: CPT

## 2020-09-22 PROCEDURE — 2060000000 HC ICU INTERMEDIATE R&B

## 2020-09-22 PROCEDURE — 85025 COMPLETE CBC W/AUTO DIFF WBC: CPT

## 2020-09-22 PROCEDURE — 36415 COLL VENOUS BLD VENIPUNCTURE: CPT

## 2020-09-22 PROCEDURE — 2580000003 HC RX 258: Performed by: FAMILY MEDICINE

## 2020-09-22 RX ADMIN — PANTOPRAZOLE SODIUM 40 MG: 40 TABLET, DELAYED RELEASE ORAL at 06:19

## 2020-09-22 RX ADMIN — DILTIAZEM HYDROCHLORIDE 5 MG/HR: 5 INJECTION INTRAVENOUS at 05:44

## 2020-09-22 RX ADMIN — GABAPENTIN 300 MG: 300 CAPSULE ORAL at 15:00

## 2020-09-22 RX ADMIN — OXYCODONE HYDROCHLORIDE 5 MG: 5 TABLET ORAL at 10:34

## 2020-09-22 RX ADMIN — Medication 10 ML: at 20:16

## 2020-09-22 RX ADMIN — MIRTAZAPINE 15 MG: 15 TABLET, FILM COATED ORAL at 20:16

## 2020-09-22 RX ADMIN — SODIUM CHLORIDE: 9 INJECTION, SOLUTION INTRAVENOUS at 05:00

## 2020-09-22 RX ADMIN — MIDODRINE HYDROCHLORIDE 10 MG: 10 TABLET ORAL at 08:12

## 2020-09-22 RX ADMIN — SODIUM CHLORIDE: 9 INJECTION, SOLUTION INTRAVENOUS at 17:40

## 2020-09-22 RX ADMIN — MIDODRINE HYDROCHLORIDE 10 MG: 10 TABLET ORAL at 15:00

## 2020-09-22 RX ADMIN — GABAPENTIN 300 MG: 300 CAPSULE ORAL at 20:16

## 2020-09-22 RX ADMIN — DIGOXIN 125 MCG: 125 TABLET ORAL at 08:12

## 2020-09-22 RX ADMIN — MIDODRINE HYDROCHLORIDE 10 MG: 10 TABLET ORAL at 20:15

## 2020-09-22 RX ADMIN — FERROUS SULFATE TAB EC 325 MG (65 MG FE EQUIVALENT) 325 MG: 325 (65 FE) TABLET DELAYED RESPONSE at 08:12

## 2020-09-22 RX ADMIN — FERROUS SULFATE TAB EC 325 MG (65 MG FE EQUIVALENT) 325 MG: 325 (65 FE) TABLET DELAYED RESPONSE at 15:00

## 2020-09-22 RX ADMIN — ESCITALOPRAM OXALATE 20 MG: 10 TABLET ORAL at 08:12

## 2020-09-22 RX ADMIN — CEFTRIAXONE SODIUM 1 G: 1 INJECTION, POWDER, FOR SOLUTION INTRAMUSCULAR; INTRAVENOUS at 17:40

## 2020-09-22 RX ADMIN — DILTIAZEM HYDROCHLORIDE 30 MG: 30 TABLET, FILM COATED ORAL at 15:00

## 2020-09-22 RX ADMIN — OXYCODONE HYDROCHLORIDE 5 MG: 5 TABLET ORAL at 17:42

## 2020-09-22 RX ADMIN — MAGNESIUM GLUCONATE 500 MG ORAL TABLET 400 MG: 500 TABLET ORAL at 08:12

## 2020-09-22 RX ADMIN — GABAPENTIN 300 MG: 300 CAPSULE ORAL at 08:12

## 2020-09-22 RX ADMIN — PANTOPRAZOLE SODIUM 40 MG: 40 TABLET, DELAYED RELEASE ORAL at 17:42

## 2020-09-22 RX ADMIN — DILTIAZEM HYDROCHLORIDE 30 MG: 30 TABLET, FILM COATED ORAL at 20:16

## 2020-09-22 ASSESSMENT — PAIN SCALES - GENERAL
PAINLEVEL_OUTOF10: 0
PAINLEVEL_OUTOF10: 7
PAINLEVEL_OUTOF10: 4
PAINLEVEL_OUTOF10: 0
PAINLEVEL_OUTOF10: 5

## 2020-09-22 ASSESSMENT — PAIN DESCRIPTION - PAIN TYPE
TYPE: CHRONIC PAIN

## 2020-09-22 ASSESSMENT — ENCOUNTER SYMPTOMS
CONSTIPATION: 0
NAUSEA: 0
DIARRHEA: 1
BLOOD IN STOOL: 0
VOMITING: 0
SHORTNESS OF BREATH: 0
WHEEZING: 0
ABDOMINAL PAIN: 0
COUGH: 0
CHEST TIGHTNESS: 0

## 2020-09-22 ASSESSMENT — PAIN DESCRIPTION - LOCATION: LOCATION: OTHER (COMMENT)

## 2020-09-22 NOTE — PLAN OF CARE
Problem: Skin Integrity:  Goal: Will show no infection signs and symptoms  Description: Will show no infection signs and symptoms  9/21/2020 2314 by Eusebia Guo RN  Outcome: Ongoing  Note: Patient is showing no S/S of infection at this time   9/21/2020 1240 by Bo Zhao RN  Outcome: Ongoing  Note: Skin assessment complete. Pt. Monitored for s/s of infection. Turn and reposition in bed, pressure reducing mattress, monitoring skin with every assessment and prn. Pressure ulcer preventions being practiced. Will continue to monitor.     Goal: Absence of new skin breakdown  Description: Absence of new skin breakdown  Outcome: Ongoing  Note: No new breakdown noted  turning patient Q2 and as needed       Problem: Cardiac:  Goal: Ability to maintain vital signs within normal range will improve  Description: Ability to maintain vital signs within normal range will improve  9/21/2020 2314 by Eusebia Guo RN  Outcome: Ongoing  9/21/2020 1240 by Bo Zhao RN  Outcome: Ongoing  Goal: Cardiovascular alteration will improve  Description: Cardiovascular alteration will improve  Outcome: Ongoing     Problem: Health Behavior:  Goal: Will modify at least one risk factor affecting health status  Description: Will modify at least one risk factor affecting health status  Outcome: Ongoing  Goal: Identification of resources available to assist in meeting health care needs will improve  Description: Identification of resources available to assist in meeting health care needs will improve  Outcome: Ongoing     Problem: Physical Regulation:  Goal: Complications related to the disease process, condition or treatment will be avoided or minimized  Description: Complications related to the disease process, condition or treatment will be avoided or minimized  9/21/2020 2314 by Eusebia Guo RN  Outcome: Ongoing  9/21/2020 1240 by Bo Zhao RN  Outcome: Ongoing     Problem: Falls - Risk of:  Goal: Will remain free

## 2020-09-22 NOTE — PROGRESS NOTES
Physical Therapy  DATE: 2020    NAME: Felicia Tobar  MRN: [id number]   : 1945    Patient not seen this date for Physical Therapy due to:  [] Blood transfusion in progress  [] Cancel by RN  [] Hemodialysis  [x]  Refusal by Patient - Pt refused PT this date, staying he doesn't want to do it. Will continue to check back when appropriate.   [] Spine Precautions   [] Strict Bedrest  [] Surgery  [] Testing      [] Other        [] PT being discontinued at this time. Patient independent. No further needs. [] PT being discontinued at this time as the patient has been transferred to hospice care. No further needs.     Ezequiel Galvez, PT

## 2020-09-22 NOTE — PROGRESS NOTES
bilaterally- no wheezes, rales or rhonchi, normal air movement, no respiratory distress  Cardiovascular: tachycardic rate, regular rhythm, normal S1 and S2, no murmurs, rubs, clicks or gallops, distal pulses intact, no carotid bruits  Abdomen: soft, non-tender, non-distended, normal bowel sounds, no masses or organomegaly  Extremities: no cyanosis, clubbing or edema  Musculoskeletal: normal range of motion, no joint swelling, deformity or tenderness  Neurologic: no cranial nerve deficit and muscle strength normal    Lab and Imaging Review     CBC  Recent Labs     09/20/20 0318 09/20/20 1817 09/21/20  0706 09/22/20  0532   WBC 12.0*  --  14.0* 10.8   HGB 7.0* 8.9* 8.7* 8.1*   HCT 24.9* 29.7* 29.3* 28.4*   MCV 78.5*  --  77.5* 79.6*     --  334 310       BMP  Recent Labs     09/20/20 0318 09/21/20  0706 09/22/20  0532   * 129* 132*   K 3.9 3.4* 3.7    100 106   CO2 18* 20 19*   BUN 14 12 11   CREATININE 0.50* 0.48* 0.59*   GLUCOSE 98 83 116*   CALCIUM 6.5* 6.5* 6.6*       LFTS  Recent Labs     09/20/20 0318   ALKPHOS 149*   ALT 10   AST 12   PROT 4.0*   BILITOT <0.10*   LABALBU 1.4*       AMYLASE/LIPASE/AMMONIA  Recent Labs     09/19/20 1819   AMYLASE 41   LIPASE 5*       PT/INR  Recent Labs     09/20/20 0318   PROTIME 14.6*   INR 1.2         ANEMIA STUDIES  Recent Labs     09/20/20 1817   LABIRON 27   TIBC 98*   FERRITIN 79   [id number] >2000*   FOLATE 2.0*   Results for Joel Meade (MRN [id number]) as of 9/22/2020 17:14   Ref. Range 9/20/2020 18:17   BRET Latest Ref Range: NEGATIVE  NEGATIVE   Smooth Muscle Ab Latest Ref Range: NONE DETECTED  1:20 (A)   Results for Joel Meade (MRN [id number]) as of 9/21/2020 09:41    Ref. Range 9/20/2020 18:17   A-1 Antitrypsin Latest Ref Range: 90 - 200 mg/dL 167   Results for Joel Meade (MRN [id number]) as of 9/21/2020 09:41    Ref.  Range 9/20/2020 18:17   Ceruloplasmin Latest Ref Range: 15 - 30 mg/dL 14 (L)   Results for Joel Meade (MRN [id number]) as of 9/21/2020 09:41    Ref. Range 9/20/2020 18:17   IgA Latest Ref Range: 70 - 400 mg/dL 350   Hep A IgM Latest Ref Range: NONREACTIVE  NONREACTIVE   Hepatitis B Surface Ag Latest Ref Range: NONREACTIVE  NONREACTIVE   Hepatitis C Ab Latest Ref Range: NONREACTIVE  NONREACTIVE   Hep B Core Ab, IgM Latest Ref Range: NONREACTIVE  NONREACTIVE   Results for Abby Millard (MRN [id number]) as of 9/21/2020 09:41    Ref. Range 9/20/2020 18:17   AFP (Alpha Fetoprotein) Latest Ref Range: <8.4 ug/L 1.6       Results for Abby Millrad (MRN [id number]) as of 9/22/2020 17:14 paracentesis   Ref. Range 9/21/2020 12:00   Appearance, Fluid Unknown NOT REPORTED   Basos, Fluid Latest Ref Range: 0 % NOT REPORTED   Color, Fluid Unknown NOT REPORTED   Eos, Fluid Latest Ref Range: 0 % NOT REPORTED   Glucose, Fluid Latest Units: mg/dL 89   LD, Fluid Latest Units: U/L 49   RBC, Fluid Latest Units: /mm3 <3,000   Lymphocytes, Body Fluid Latest Units: % 20   Monocyte Count, Fluid Latest Units: % NOT REPORTED   Neutrophil Count, Fluid Latest Units: % 67   Total Protein, Body Fluid Latest Units: g/dL <1.0   WBC, Fluid Latest Units: /mm3 43   Amylase, Fluid Latest Units: U/L 7   Albumin, Fluid Latest Units: g/dL 0.2   Other Cells, Fluid Latest Units: % MONOCYTES     Ct Abdomen Pelvis W Iv Contrast     Result Date: 9/19/2020  EXAMINATION: CT OF THE ABDOMEN AND PELVIS WITH CONTRAST 9/19/2020 12:27 pm  Initial FINDINGS: Lower Chest: Mild to moderate right pleural effusion.  Small left pleural effusion.  Emphysematous changes in the lungs.  Compressive atelectasis both lung bases.  Coronary artery calcification. Organs: Liver contains several subcentimeter hypodensities near the dome right lobe consistent with cysts.  Liver is slightly lobular in contour.  The spleen, pancreas, and adrenals are unremarkable.  Gallstones are noted within the gallbladder.  Kidneys demonstrate no evidence of hydronephrosis or nephrolithiasis.  GI/Bowel: Small amount of upper abdominal ascites and haziness in the mesentery is noted.  No free air or bowel obstruction is noted.  Gastric folds are prominent and thickened consistent with gastritis. Pelvis: No evidence of appendicitis.  Small amount of pelvic fluid.  Bladder is decompressed limiting assessment but bladder wall appears prominent. Prostate is enlarged.  Bladder wall thickening may be related to hypertrophy. No inguinal adenopathy is noted.  Atherosclerotic calcification of the iliac vessels is noted, moderately significant. Peritoneum/Retroperitoneum: No aortic aneurysm.  Diffuse atherosclerotic calcification of the aorta is noted, mild to moderate.  An Bones/Soft Tissues: No acute osseous or soft tissue abnormality.      1.  Moderate right and small left pleural effusions with compressive atelectasis at the lung bases in this patient with emphysematous changes. 2.  Prominent gastric fold thickening most compatible with gastritis. 3.  Liver appears slightly lobular.  Additionally, abdominal ascites is noted.  The constellation of findings are consistent with cirrhosis. 4.  Enlarged prostate.  Bladder wall appears thickened although bladder is suboptimally distended. 5.  Atherosclerotic disease. 6.  No bowel obstruction or urinary obstruction although gallstones are present.  No evidence of appendicitis.        ASSESSMENT/PLAN:  1. Anemia, no overt bleeding iron at 26 with normal saturation  -trend hh and keep hgb>7  -clear diet tomorrow and plan for egd and colonoscopy thurs    2. Ascites with possible cirrhosis per imaging, s/p paracentesis yesterday that did not show SBP or malignancy. SAAG is 1.2 with TP of <1.0, c/w liver source  -autoimmune/viral testing still pending    3.possible pneumonia/sepsis mgt per ID        This plan was formulated in collaboration with Dr. Albino Sibley.     Electronically signed by: GEOFF Garcia CNP on 9/22/2020 at 5:17 PM

## 2020-09-22 NOTE — PROGRESS NOTES
[facility])    Progress Note    2020 4:02 PM      Subjective:  Mr. Priya Calderon  Has no complaints, has a.fib rates better         LABS:     CBC:   Recent Labs     20  1817 20  0706 20  0532   WBC 12.0*  --  14.0* 10.8   HGB 7.0* 8.9* 8.7* 8.1*   HCT 24.9* 29.7* 29.3* 28.4*   MCV 78.5*  --  77.5* 79.6*     --  334 310     BMP:   Recent Labs     20  0706 20  0532   * 129* 132*   K 3.9 3.4* 3.7    100 106   CO2 18* 20 19*   PHOS 1.9*  --   --    BUN 14 12 11   CREATININE 0.50* 0.48* 0.59*     PT/INR:   Recent Labs     20   PROTIME 14.6*   INR 1.2     APTT:   No results for input(s): APTT in the last 72 hours. MAG:   Recent Labs     20  0318   MG 1.7     D Dimer: No results for input(s): DDIMER in the last 72 hours. Troponin T   No results for input(s): TROPONINT in the last 72 hours. Pro-BNP No results for input(s): BNP in the last 72 hours. Pulse Ox:  SpO2  Av %  Min: 93 %  Max: 96 %  Supplemental O2:       Current Meds: midodrine (PROAMATINE) tablet 10 mg, TID  oxyCODONE (ROXICODONE) immediate release tablet 5 mg, Q8H PRN  potassium chloride (KLOR-CON M) extended release tablet 40 mEq, PRN    Or  potassium bicarb-citric acid (EFFER-K) effervescent tablet 40 mEq, PRN    Or  potassium chloride 10 mEq/100 mL IVPB (Peripheral Line), PRN  0.9 % sodium chloride infusion, Continuous  dilTIAZem (CARDIZEM) tablet 30 mg, 4 times per day  sodium chloride flush 0.9 % injection 10 mL, PRN  ferrous sulfate (FE TABS 325) EC tablet 325 mg, TID WC  escitalopram (LEXAPRO) tablet 20 mg, Daily  mirtazapine (REMERON) tablet 15 mg, Nightly  magnesium oxide (MAG-OX) tablet 400 mg, Daily  gabapentin (NEURONTIN) capsule 300 mg, TID  [START ON 10/5/2020] vitamin D (ERGOCALCIFEROL) capsule 50,000 Units, Q30 Days  pantoprazole (PROTONIX) tablet 40 mg, BID AC  sodium chloride flush 0.9 % injection 10 mL, 2 times per day  sodium chloride flush 0.9 % injection 10 mL, PRN  digoxin (LANOXIN) tablet 125 mcg, Daily  [Held by provider] metoprolol tartrate (LOPRESSOR) tablet 12.5 mg, BID  apixaban (ELIQUIS) tablet 5 mg, BID  cefTRIAXone (ROCEPHIN) 1 g IVPB in 50 mL D5W minibag, Q24H  dilTIAZem 125 mg in dextrose 5 % 125 mL infusion, Continuous      Continuous Infusions:    sodium chloride 75 mL/hr at 09/22/20 0500    dilTIAZem (CARDIZEM) 125 mg in dextrose 5% 125 mL infusion 5 mg/hr (09/22/20 0544)          VITAL SIGNS:    BP (!) 113/53   Pulse 112   Temp 98.2 °F (36.8 °C) (Oral)   Resp 16   Ht 5' 10\" (1.778 m)   Wt 113 lb 12.8 oz (51.6 kg)   SpO2 93%   BMI 16.33 kg/m²         Admit Weight:  127 lb (57.6 kg)    Last 3 weights: Wt Readings from Last 3 Encounters:   09/22/20 113 lb 12.8 oz (51.6 kg)   08/27/17 (S) 127 lb (57.6 kg)       BMI: Body mass index is 16.33 kg/m². INPUT/OUTPUT:          Intake/Output Summary (Last 24 hours) at 9/22/2020 1602  Last data filed at 9/22/2020 1307  Gross per 24 hour   Intake 4034 ml   Output 1250 ml   Net 2784 ml       EKG: tele a.fib with rvr    EXAM:     General appearance:frail  Lungs: Clear to Auscultation bilaterally  Heart: irregular  Abdomen: Soft nontender  Extremities: No edema      Principal Problem:    Atrial fibrillation with RVR (HCC)  Active Problems:    Elevated brain natriuretic peptide (BNP) level    History of alcoholism (HCC)    Cirrhosis of liver with ascites (HCC)    Chronic atrial fibrillation    Leukocytosis    Anemia    Thrombocytosis (HCC)    Severe malnutrition (HCC)    Sarcopenia    Hyponatremia    Sepsis (HCC)    Malignant melanoma of lower extremity, including hip (HCC)    Dizziness    Acute on chronic diastolic heart failure (HCC)  Resolved Problems:    * No resolved hospital problems.  *      ASSESSMENT / PLAN    Add cardizem oral try to wean off iv cardizem  Dig level ok  Normal EF    numerous non cardiac medical issues are playing role in why he is in

## 2020-09-22 NOTE — PROGRESS NOTES
[facility]  Speech Language Pathology    Date: 9/22/2020  Patient Name: Barbara Kim  YOB: 1945   AGE: 77 y.o. MRN: [id number]        Patient Not Available for Speech Therapy     Due to:  [] Testing  [] Hemodialysis  [] Cancelled by RN  [] Surgery   [] Intubation/Sedation/Pain Medication  [] Medical instability  [x] Other: Spoke with RN, pt. Will be unwilling to participate in exercises. Next scheduled treatment: 9/23  Completed by: Deepali Kendrick M.A.  CCC-SLP

## 2020-09-22 NOTE — CONSULTS
[address] [place] [address] Continence Nurse  Consult Note       NAME:  Daniela Benedict  MEDICAL RECORD NUMBER:  [id number]  AGE: 77 y.o. GENDER: male  : 1945  TODAY'S DATE:  2020    Subjective:     Reason for Wound [place]o Care and Assessment: wound      Daniela Benedict is a 77 y.o. male referred by:   [x] Physician  [] Nursing  [] Other:       Wound Identification:  Wound Type: venous and pressure  Contributing Factors: edema, venous stasis, chronic pressure, decreased mobility, shear force and malnutrition    Wound History: the patient is a poor historian and is unable to explain how or when wounds began   Current Wound Care Treatment:  Petroleum impregnated dressing    Patient Goal of Care:  [] Wound Healing  [] Odor Control  [] Palliative Care  [] Pain Control   [] Other:         PAST MEDICAL HISTORY        Diagnosis Date    [facility]     Hypertension        PAST SURGICAL HISTORY    Past Surgical History:   Procedure Laterality Date    HERNIA REPAIR      HYDROCELE EXCISION         FAMILY HISTORY    History reviewed. No pertinent family history.     SOCIAL HISTORY    Social History     Tobacco Use    Smoking status: Current Every Day Smoker     Packs/day: 0.50    Smokeless tobacco: Never Used   Substance Use Topics    Alcohol use: Yes     Comment: former alcoholic    Drug use: No       ALLERGIES    No Known Allergies        Objective:      BP (!) 115/56   Pulse 107   Temp 98.2 °F (36.8 °C) (Oral)   Resp 16   Ht 5' 10\" (1.778 m)   Wt 113 lb 12.8 oz (51.6 kg)   SpO2 96%   BMI 16.33 kg/m²       LABS    CBC:   Lab Results   Component Value Date    WBC 10.8 2020    RBC 3.57 2020    HGB 8.1 2020     CMP:  Albumin:    Lab Results   Component Value Date    LABALBU 1.4 2020     PT/INR:    Lab Results   Component Value Date    PROTIME 14.6 2020    INR 1.2 2020     HgBA1c:  No results found for: LABA1C  PTT: No components found for: LABPTT    Review of Systems    Assessment:     Physical Exam      Yusef Risk Score: Yusef Scale Score: 13    Patient Active Problem List   Diagnosis Code    Atrial fibrillation with RVR (HCC) I48.91    Elevated brain natriuretic peptide (BNP) level R79.89    History of alcoholism (HCC) F10.21    Other ascites R18.8    Cirrhosis of liver with ascites (HCC) K74.60, R18.8    Chronic atrial fibrillation I48.20    Leukocytosis D72.829    Anemia D64.9    Thrombocytosis (HCC) D47.3    Severe malnutrition (HCC) E43    Sarcopenia M62.84    Hyponatremia E87.1    Sepsis (HCC) A41.9    Malignant melanoma of lower extremity, including hip (HCC) C43.70    Dizziness R42    Acute on chronic diastolic heart failure (HCC) I50.33       Patient Active Problem List   Diagnosis    Atrial fibrillation with RVR (HCC)    Elevated brain natriuretic peptide (BNP) level    History of alcoholism (HCC)    Other ascites    Cirrhosis of liver with ascites (HCC)    Chronic atrial fibrillation    Leukocytosis    Anemia    Thrombocytosis (HCC)    Severe malnutrition (HCC)    Sarcopenia    Hyponatremia    Sepsis (HCC)    Malignant melanoma of lower extremity, including hip (HCC)    Dizziness    Acute on chronic diastolic heart failure (HCC)       Measurements:  Puncture 09/21/20 Abdomen Right (Active)   Number of days: 1       Wound Foot Right;Dorsal (Active)   Wound Image   09/22/20 1502   Wound Venous 09/22/20 1502   Dressing Status Old drainage; Changed 09/22/20 1502   Dressing Changed Changed/New 09/22/20 1502   Dressing/Treatment Alginate with Ag;ABD; Ace wrap 09/22/20 1502   Wound Cleansed Rinsed/Irrigated with saline; Soap and water 09/22/20 1502   Dressing Change Due 09/23/20 09/22/20 1502   Wound Length (cm) 4.2 cm 09/22/20 1502   Wound Width (cm) 1.8 cm 09/22/20 1502   Wound Depth (cm) 0.1 cm 09/22/20 1502   Wound Surface Area (cm^2) 7.56 cm^2 09/22/20 1502   Wound Volume (cm^3) 0.76 cm^3 09/22/20 1502   Wound Assessment Drainage;Red;Slough; Yellow 09/22/20 1502   Drainage Amount Moderate 09/22/20 1502   Drainage Description Serosanguinous 09/22/20 1502   Odor None 09/22/20 1502   Margins Attached edges; Defined edges 09/22/20 1502   Evelia-wound Assessment Edema 09/22/20 1502   Red%Wound Bed 75 09/22/20 1502   Yellow%Wound Bed 25 09/22/20 1502   Number of days:        Wound Ear Left behind ear (Active)   Wound Image   09/22/20 1502   Wound Pressure Stage  3 09/22/20 1502   Dressing Status Old drainage; Changed 09/22/20 1502   Dressing Changed Changed/New 09/22/20 1502   Dressing/Treatment Foam;Alginate with Ag 09/22/20 1502   Wound Cleansed Rinsed/Irrigated with saline 09/22/20 1502   Dressing Change Due 09/24/20 09/22/20 1502   Wound Length (cm) 1.2 cm 09/22/20 1502   Wound Width (cm) 0.9 cm 09/22/20 1502   Wound Depth (cm) 0.3 cm 09/22/20 1502   Wound Surface Area (cm^2) 1.08 cm^2 09/22/20 1502   Wound Volume (cm^3) 0.32 cm^3 09/22/20 1502   Wound Assessment Drainage;Red;Slough; Yellow 09/22/20 1502   Drainage Amount Moderate 09/22/20 1502   Drainage Description Serosanguinous 09/22/20 1502   Odor None 09/22/20 1502   Margins Attached edges; Defined edges 09/22/20 1502   [place]%Wound Bed 75 09/22/20 1502   Yellow%Wound Bed 25 09/22/20 1502   Number of days:      Venous stasis ulceration cluster to right dorsal foot. Appears to be venous in nature and related to pedal edema. Treatment should include moisture mgmt and elevation/compression. MONIK form 2018 WNL. Pressure ulcer found behind ear. Likely from glasses. The patient states it has been hurting him for a week. The ulceration appears old as it was crusted over, took a great deal to cleanse and loosen up crusted drainage. Response to treatment:  With complaints of pain. Plan:     Plan of Care:     Right dorsal foot wound care: cleanse with soap and water, rinse, pat dry. Apply Maxorb Ag to cover wound cluster, reinforce with ABD and roll gauze.  Secure with loose ace to foot.     Left behind ear care:  cleanse with saline, pat dry. Apply Maxorb Ag to cover wound. Cut foam to fit. Change every other day. -Turn every 2 hours    -Float heels of of bed with pillows under     -Mepilex sacrum dressing to sacrococcygeal area. Peel back dressing, inspect skin beneath, re-secure. Change every 72 hours and prn wrinkles, soilage. Discontinue Sacral Mepilex if  repeatedly soiled by incontinence.     -Routine incontinence care with Antoine barrier cloths and zinc oxide cream. Apply zinc oxide cream BID and prn incontinence. Covidien moisture wicking under pads vs cloth backed briefs    -Static air overlay. Check inflation each shift by sliding hand under the air overlay. Feel for the patient's heaviest/ most dependant body part. Ideally 1/2 to 1\" of air will be between your hand and the patient's body. Add air prn.     Specialty Bed Required : Yes   [] Low Air Loss   [x] Pressure Redistribution  [] Fluid Immersion  [] Bariatric  [] Total Pressure Relief  [] Other:     Current Diet: Dietary Nutrition Supplements:  DIET DENTAL SOFT; Mildly Thick (Nectar)  Dietician consult:  Yes    Discharge Plan:  Placement for patient upon discharge: skilled nursing   Patient appropriate for Outpatient [address] [address]: Yes    Patient/Caregiver Teaching:  Level of patientunderstanding able to:     [x] Indicates understanding       [x] Needs reinforcement  [] Unsuccessful      [] Verbal Understanding  [] Demonstrated understanding       [] No evidence of learning  [] Refused teaching         [] N/A       Electronically signed by Maged Cameron RN on  9/22/2020 at 3:06 PM

## 2020-09-22 NOTE — PROGRESS NOTES
Physician Progress Note      PATIENT:               Jose Moody  CSN #:                  [id number]  :                       1945  ADMIT DATE:       2020 11:24 AM  DISCH DATE:  RESPONDING  PROVIDER #:        Lita Jesus NP          QUERY TEXT:    Pt admitted with Atrial Fib with RVR. Pt noted to have SIRS documented. If   possible, please document in the progress notes and discharge summary if you   are evaluating and /or treating any of the following: The medical record reflects the following:  Risk Factors: Possible pneumonia  Clinical Indicators: elevated lactic acid 7.2, WBC 17.1-->2-->14.1,   Procalcitonin level 10.59, heart rate tachy, chest xray shows Increasing   bibasilar opacities probably reflecting very small effusions and underlying   bibasilar atelectasis/infiltrates, documentation of possible pneumonia,   documentation per ID, \"Sepsis picture /lactic acidosis/leukocytosis/elevated   procalcitonin level/possible right lower lobe pneumonia,\" CRP 99  Treatment: IV Fluid boluses x 3, continuous IV Fluids, IV Rocephin, IV   Vancomycin, monitoring labs, chest xeay, blood cx    Thank you! Lexi Pinto RN, [address] [place] [address]  [phone]  Options provided:  -- Sepsis, present on admission  -- Sepsis, not present on admission,  -- No Sepsis, localized infection only  -- Sepsis was ruled out  -- Other - I will add my own diagnosis  -- Disagree - Not applicable / Not valid  -- Disagree - Clinically unable to determine / Unknown  -- Refer to Clinical Documentation Reviewer    PROVIDER RESPONSE TEXT:    This patient has Sepsis which was present on admission. Query created by: Ether Opitz on 2020 12:56 PM      QUERY TEXT:    Pt admitted with Atrial Fibrillation with RVR and has CHF documented.  If   possible, please document in progress notes and discharge summary further   specificity regarding the type and acuity of CHF:    The medical record reflects the following:  Risk Factors: HTN, IV Fluids  Clinical Indicators: elevated , Chest xray shows bilateral pleural   effusion more to the right, breath sounds diminished, rt leg lower extremity   edema, no echo, documentation of heart failure  Treatment: IV Lasix daily, chest xray, Monitoring labs, decrease IV Fluids,   Echo ordered    Thank you!   Lidia Kelly RN, CCDS, CRCR  [phone]  Options provided:  -- Acute Systolic CHF/HFrEF  -- Acute Diastolic CHF/HFpEF  -- Acute Systolic and Diastolic CHF  -- Other - I will add my own diagnosis  -- Disagree - Not applicable / Not valid  -- Disagree - Clinically unable to determine / Unknown  -- Refer to Clinical Documentation Reviewer    PROVIDER RESPONSE TEXT:    Acute on chronic diastolic heart failure    Query created by: Mary Nur on 9/21/2020 1:04 PM      Electronically signed by:  Rm Jesus NP 9/22/2020 2:58 PM

## 2020-09-22 NOTE — PLAN OF CARE
Problem: Skin Integrity:  Goal: Will show no infection signs and symptoms  Description: Will show no infection signs and symptoms  9/22/2020 1257 by Sergei Elizabeth RN  Outcome: Ongoing  Note: Skin assessment complete. Pt. Monitored for s/s of infection. Turn and reposition in bed, pressure reducing mattress, monitoring skin with every assessment and prn. Pressure ulcer preventions being practiced. Will continue to monitor. Problem: Cardiac:  Goal: Ability to maintain vital signs within normal range will improve  Description: Ability to maintain vital signs within normal range will improve  9/22/2020 1257 by Sergei Elizabeth RN  Outcome: Ongoing     Problem: Physical Regulation:  Goal: Complications related to the disease process, condition or treatment will be avoided or minimized  Description: Complications related to the disease process, condition or treatment will be avoided or minimized  9/22/2020 1257 by Sergei Elizabeth RN  Outcome: Ongoing     Problem: Falls - Risk of:  Goal: Will remain free from falls  Description: Will remain free from falls  9/22/2020 1257 by Sergei Elizabeth RN  Outcome: Ongoing  Note: Pt fall risk, fall band present, falling star, safety alarm activated and in use as needed. Bed in lowest position, call light within reach. Hourly rounding performed. Pt encouraged to use call light. See Marlo Youssef fall risk assessment. Patient offered toileting assistance during rounding. Hourly rounds performed.

## 2020-09-22 NOTE — PROGRESS NOTES
[facility]  Office: [address], DO, Hilary Daley, DO, Virgie St, DO, Georges Wilson, DO, Nilson Cuevas MD, Rebeca Rosario MD, Deanna Dillard MD, Anabel Marley MD, Brigitte Bryan MD, Jw Szymanski MD, Michael Hernandez MD, Dyana Giles MD, Cal Peña MD, Kirsten Ahn DO, Christine Jarvis MD, Tiffany Grace MD, Ian Elizabeth DO, Jarocho Rosales MD,  Kma Monroy DO, Neena Magallon MD, Evin Moyer MD, Zia Teresa [facility], [facility], CNP, Donte Holman, CNP, Orlando Alas, CNS, Perri Vásquez, CNP, Claude Leep, CNP, Rosauar Miguel, CNP, Esthela Best, CNP, Leland Higgins, CNP, Jossue Benoit PA-C, Peggy Tejeda, RICHELLE, Coltete Schafer, CNP, Sonia Aranda, CNP, Eric Romero, CNP, Elba Lora, CNP, Chin Garcia, [facility]    Progress Note    9/22/2020    2:40 PM    Name:   Bertha Chambers  MRN:     [id number]     Acct:      [de-identified]   Room:   [address] [place] [address] Day:  3  Admit Date:  9/19/2020 11:24 AM    PCP:   GEOFF Faust CNP  Code Status:  Full Code    Subjective:     C/C:   Chief Complaint   Patient presents with    Dizziness    Palpitations     Interval History Status: not changed. Patient blood pressure and heart rate are better controlled this morning. Appreciate cardiology recommendations however we anticipate transition to oral regimen today. GI evaluation is underway. Hemoglobin is 8.1 today, trending down again, defer to GI on extent of admission evaluation. Patient apparently underwent paracentesis yesterday. Cytology and culture reports are pending. Patient's blood cultures remain positive x1 and is a likely contaminant. Infectious disease is on board and is narrowing antimicrobial coverage. PT/OT are underway and patient is very difficult to work with and resistant to their recommendations.   Patient's diet has been changed to nectar thick due to a failed swallow  gabapentin  300 mg Oral TID    [START ON 10/5/2020] vitamin D  50,000 Units Oral Q30 Days    pantoprazole  40 mg Oral BID AC    sodium chloride flush  10 mL Intravenous 2 times per day    digoxin  125 mcg Oral Daily    [Held by provider] metoprolol tartrate  12.5 mg Oral BID    apixaban  5 mg Oral BID    cefTRIAXone (ROCEPHIN) IV  1 g Intravenous Q24H     Continuous Infusions:    sodium chloride 75 mL/hr at 20 0500    dilTIAZem (CARDIZEM) 125 mg in dextrose 5% 125 mL infusion 5 mg/hr (20 0544)     PRN Meds: oxyCODONE **OR** [DISCONTINUED] oxyCODONE, potassium chloride **OR** potassium alternative oral replacement **OR** potassium chloride, sodium chloride flush, sodium chloride flush    Data:     Past Medical History:   has a past medical history of History of alcoholism ([address] and Hypertension. Social History:   reports that he has been smoking. He has been smoking about 0.50 packs per day. He has never used smokeless tobacco. He reports current alcohol use. He reports that he does not use drugs. Family History: History reviewed. No pertinent family history. Vitals:  BP (!) 115/56   Pulse 107   Temp 98.2 °F (36.8 °C) (Oral)   Resp 16   Ht 5' 10\" (1.778 m)   Wt 113 lb 12.8 oz (51.6 kg)   SpO2 96%   BMI 16.33 kg/m²   Temp (24hrs), Av.8 °F (36.6 °C), Min:97.6 °F (36.4 °C), Max:98.2 °F (36.8 °C)    No results for input(s): POCGLU in the last 72 hours. I/O (24Hr):     Intake/Output Summary (Last 24 hours) at 2020 1440  Last data filed at 2020 1307  Gross per 24 hour   Intake 4034 ml   Output 1250 ml   Net 2784 ml       Labs:  Hematology:  Recent Labs     20  0318 20  1817 20  0706 20  0532   WBC 12.0*  --  14.0* 10.8   RBC 3.17*  --  3.78* 3.57*   HGB 7.0* 8.9* 8.7* 8.1*   HCT 24.9* 29.7* 29.3* 28.4*   MCV 78.5*  --  77.5* 79.6*   MCH 22.1*  --  23.0* 22.7*   MCHC 28.1*  --  29.7 28.5   RDW 18.2*  --  18.3* 18.5*     --  334 310 9/19/2020  1. No evidence of pulmonary embolism. 2.  Emphysematous changes. 3. small to moderate right pleural effusion with trace left effusion and basilar atelectasis. 4.  Mild ascites upper abdomen. Physical Examination:        Physical Exam  Vitals signs and nursing note reviewed. Constitutional:       General: He is not in acute distress. Appearance: He is well-developed. He is not diaphoretic. Comments: Underweight and malnourished, poor personal hygiene   HENT:      Head: Normocephalic and atraumatic. Right Ear: Hearing normal.      Left Ear: Hearing normal.      Nose: Nose normal. No rhinorrhea. Eyes:      General: Lids are normal.      Extraocular Movements:      Right eye: Normal extraocular motion. Left eye: Normal extraocular motion. Conjunctiva/sclera: Conjunctivae normal.      Right eye: Right conjunctiva is not injected. Left eye: Left conjunctiva is not injected. Pupils: Pupils are equal, round, and reactive to light. Pupils are equal.      Right eye: Pupil is reactive. Left eye: Pupil is reactive. Neck:      Musculoskeletal: Neck supple. Thyroid: No thyromegaly. Vascular: No carotid bruit. Trachea: Trachea and phonation normal. No tracheal deviation. Cardiovascular:      Rate and Rhythm: Normal rate. Rhythm irregular. Pulses: Normal pulses. Heart sounds: Normal heart sounds. No murmur. Pulmonary:      Effort: Pulmonary effort is normal. No respiratory distress. Breath sounds: No stridor. Examination of the right-lower field reveals decreased breath sounds. Decreased breath sounds present. Abdominal:      General: Bowel sounds are normal. There is no distension. Palpations: Abdomen is soft. There is no mass. Tenderness: There is no abdominal tenderness. There is no guarding. Musculoskeletal:         General: No tenderness. Right lower leg: Edema present.       Comments: Left above-knee amputation Skin:     General: Skin is warm and dry. Findings: No erythema, lesion or rash.         Assessment:        Hospital Problems           Last Modified POA    * (Principal) Atrial fibrillation with RVR ([address] 9/19/2020 Yes    Elevated brain natriuretic peptide (BNP) level 9/19/2020 Yes    History of alcoholism ([address] 9/19/2020 Yes    Cirrhosis of liver with ascites ([address] 9/19/2020 Yes    Chronic atrial fibrillation 9/19/2020 Yes    Leukocytosis 9/19/2020 Yes    Anemia 9/21/2020 Yes    Thrombocytosis ([address] 9/19/2020 Yes    Severe malnutrition ([address] 9/19/2020 Yes    Sarcopenia 9/19/2020 Yes    Hyponatremia 9/19/2020 Yes    Sepsis ([address] 9/20/2020 Yes    Malignant melanoma of lower extremity, including hip ([address] 9/20/2020 Yes    Overview Signed 9/20/2020  3:34 PM by GEOFF Chou CNP     Hx of left foot with left groin lymph node positive for melanoma         Dizziness 9/20/2020 Yes    Acute on chronic diastolic heart failure ([address] 9/22/2020 Yes          Plan:          · Continue antibiotics per ID for likely sepsis of unknown origin  · Digoxin and Cardizem per cardiology recommendation, anticipate transition to oral today  · Continue to hold anticoagulation pending GI evaluation  · GI consultation to determine necessity of inpatient versus outpatient anemia work-up including EGD/colonoscopy  · PT/OT for outpatient recommendations on discharge  · Nectar thick diet due to swallow study recommendations  · Blood pressure well controlled today  · Continue to follow paracentesis cultures  · Continue to follow blood cultures, single positive culture, likely contaminant, ID on board      GEOFF Lino NP  9/22/2020  2:40 PM

## 2020-09-23 LAB
ABSOLUTE EOS #: <0.03 K/UL (ref 0–0.44)
ABSOLUTE IMMATURE GRANULOCYTE: 0.16 K/UL (ref 0–0.3)
ABSOLUTE LYMPH #: 1.41 K/UL (ref 1.1–3.7)
ABSOLUTE MONO #: 0.62 K/UL (ref 0.1–1.2)
ANION GAP SERPL CALCULATED.3IONS-SCNC: 6 MMOL/L (ref 9–17)
BASOPHILS # BLD: 0 % (ref 0–2)
BASOPHILS ABSOLUTE: 0.03 K/UL (ref 0–0.2)
BUN BLDV-MCNC: 12 MG/DL (ref 8–23)
BUN/CREAT BLD: 22 (ref 9–20)
CALCIUM IONIZED: 1.14 MMOL/L (ref 1.13–1.33)
CALCIUM SERPL-MCNC: 6.6 MG/DL (ref 8.6–10.4)
CHLORIDE BLD-SCNC: 107 MMOL/L (ref 98–107)
CO2: 20 MMOL/L (ref 20–31)
CREAT SERPL-MCNC: 0.55 MG/DL (ref 0.7–1.2)
DIFFERENTIAL TYPE: ABNORMAL
EOSINOPHILS RELATIVE PERCENT: 0 % (ref 1–4)
GFR AFRICAN AMERICAN: >60 ML/MIN
GFR NON-AFRICAN AMERICAN: >60 ML/MIN
GFR SERPL CREATININE-BSD FRML MDRD: ABNORMAL ML/MIN/{1.73_M2}
GFR SERPL CREATININE-BSD FRML MDRD: ABNORMAL ML/MIN/{1.73_M2}
GLUCOSE BLD-MCNC: 79 MG/DL (ref 70–99)
HCT VFR BLD CALC: 27.9 % (ref 40.7–50.3)
HEMOGLOBIN: 8.1 G/DL (ref 13–17)
IMMATURE GRANULOCYTES: 2 %
LYMPHOCYTES # BLD: 13 % (ref 24–43)
MCH RBC QN AUTO: 23.1 PG (ref 25.2–33.5)
MCHC RBC AUTO-ENTMCNC: 29 G/DL (ref 28.4–34.8)
MCV RBC AUTO: 79.5 FL (ref 82.6–102.9)
MONOCYTES # BLD: 6 % (ref 3–12)
NRBC AUTOMATED: 0 PER 100 WBC
PDW BLD-RTO: 18.6 % (ref 11.8–14.4)
PLATELET # BLD: 294 K/UL (ref 138–453)
PLATELET ESTIMATE: ABNORMAL
PMV BLD AUTO: 9.6 FL (ref 8.1–13.5)
POTASSIUM SERPL-SCNC: 3.9 MMOL/L (ref 3.7–5.3)
RBC # BLD: 3.51 M/UL (ref 4.21–5.77)
RBC # BLD: ABNORMAL 10*6/UL
SEG NEUTROPHILS: 79 % (ref 36–65)
SEGMENTED NEUTROPHILS ABSOLUTE COUNT: 8.26 K/UL (ref 1.5–8.1)
SODIUM BLD-SCNC: 133 MMOL/L (ref 135–144)
WBC # BLD: 10.5 K/UL (ref 3.5–11.3)
WBC # BLD: ABNORMAL 10*3/UL

## 2020-09-23 PROCEDURE — 6360000002 HC RX W HCPCS: Performed by: FAMILY MEDICINE

## 2020-09-23 PROCEDURE — 6370000000 HC RX 637 (ALT 250 FOR IP): Performed by: FAMILY MEDICINE

## 2020-09-23 PROCEDURE — APPSS45 APP SPLIT SHARED TIME 31-45 MINUTES: Performed by: NURSE PRACTITIONER

## 2020-09-23 PROCEDURE — 99232 SBSQ HOSP IP/OBS MODERATE 35: CPT | Performed by: INTERNAL MEDICINE

## 2020-09-23 PROCEDURE — 2580000003 HC RX 258: Performed by: FAMILY MEDICINE

## 2020-09-23 PROCEDURE — 6370000000 HC RX 637 (ALT 250 FOR IP): Performed by: INTERNAL MEDICINE

## 2020-09-23 PROCEDURE — 80048 BASIC METABOLIC PNL TOTAL CA: CPT

## 2020-09-23 PROCEDURE — 51702 INSERT TEMP BLADDER CATH: CPT

## 2020-09-23 PROCEDURE — 82330 ASSAY OF CALCIUM: CPT

## 2020-09-23 PROCEDURE — 36415 COLL VENOUS BLD VENIPUNCTURE: CPT

## 2020-09-23 PROCEDURE — APPNB30 APP NON BILLABLE TIME 0-30 MINS: Performed by: NURSE PRACTITIONER

## 2020-09-23 PROCEDURE — 85025 COMPLETE CBC W/AUTO DIFF WBC: CPT

## 2020-09-23 PROCEDURE — 2060000000 HC ICU INTERMEDIATE R&B

## 2020-09-23 RX ADMIN — MAGNESIUM GLUCONATE 500 MG ORAL TABLET 400 MG: 500 TABLET ORAL at 08:40

## 2020-09-23 RX ADMIN — MIDODRINE HYDROCHLORIDE 10 MG: 10 TABLET ORAL at 21:38

## 2020-09-23 RX ADMIN — FERROUS SULFATE TAB EC 325 MG (65 MG FE EQUIVALENT) 325 MG: 325 (65 FE) TABLET DELAYED RESPONSE at 17:13

## 2020-09-23 RX ADMIN — MIRTAZAPINE 15 MG: 15 TABLET, FILM COATED ORAL at 21:38

## 2020-09-23 RX ADMIN — Medication 10 ML: at 22:30

## 2020-09-23 RX ADMIN — DILTIAZEM HYDROCHLORIDE 30 MG: 30 TABLET, FILM COATED ORAL at 08:40

## 2020-09-23 RX ADMIN — FERROUS SULFATE TAB EC 325 MG (65 MG FE EQUIVALENT) 325 MG: 325 (65 FE) TABLET DELAYED RESPONSE at 12:17

## 2020-09-23 RX ADMIN — OXYCODONE HYDROCHLORIDE 5 MG: 5 TABLET ORAL at 12:17

## 2020-09-23 RX ADMIN — DILTIAZEM HYDROCHLORIDE 30 MG: 30 TABLET, FILM COATED ORAL at 21:38

## 2020-09-23 RX ADMIN — MIDODRINE HYDROCHLORIDE 10 MG: 10 TABLET ORAL at 08:42

## 2020-09-23 RX ADMIN — FERROUS SULFATE TAB EC 325 MG (65 MG FE EQUIVALENT) 325 MG: 325 (65 FE) TABLET DELAYED RESPONSE at 08:41

## 2020-09-23 RX ADMIN — CEFTRIAXONE SODIUM 1 G: 1 INJECTION, POWDER, FOR SOLUTION INTRAMUSCULAR; INTRAVENOUS at 17:13

## 2020-09-23 RX ADMIN — GABAPENTIN 300 MG: 300 CAPSULE ORAL at 08:40

## 2020-09-23 RX ADMIN — ESCITALOPRAM OXALATE 20 MG: 10 TABLET ORAL at 08:40

## 2020-09-23 RX ADMIN — PANTOPRAZOLE SODIUM 40 MG: 40 TABLET, DELAYED RELEASE ORAL at 08:40

## 2020-09-23 RX ADMIN — GABAPENTIN 300 MG: 300 CAPSULE ORAL at 21:38

## 2020-09-23 RX ADMIN — DILTIAZEM HYDROCHLORIDE 30 MG: 30 TABLET, FILM COATED ORAL at 03:33

## 2020-09-23 RX ADMIN — OXYCODONE HYDROCHLORIDE 5 MG: 5 TABLET ORAL at 04:17

## 2020-09-23 RX ADMIN — DIGOXIN 125 MCG: 125 TABLET ORAL at 08:40

## 2020-09-23 RX ADMIN — SODIUM CHLORIDE: 9 INJECTION, SOLUTION INTRAVENOUS at 06:54

## 2020-09-23 ASSESSMENT — PAIN DESCRIPTION - PAIN TYPE: TYPE: CHRONIC PAIN

## 2020-09-23 ASSESSMENT — PAIN SCALES - GENERAL
PAINLEVEL_OUTOF10: 4
PAINLEVEL_OUTOF10: 4
PAINLEVEL_OUTOF10: 5
PAINLEVEL_OUTOF10: 0
PAINLEVEL_OUTOF10: 8
PAINLEVEL_OUTOF10: 5

## 2020-09-23 ASSESSMENT — ENCOUNTER SYMPTOMS
NAUSEA: 0
WHEEZING: 0
ABDOMINAL PAIN: 0
CONSTIPATION: 0
VOMITING: 0
COUGH: 0
BLOOD IN STOOL: 0
CHEST TIGHTNESS: 0
DIARRHEA: 1
SHORTNESS OF BREATH: 0

## 2020-09-23 ASSESSMENT — PAIN DESCRIPTION - PROGRESSION
CLINICAL_PROGRESSION: RAPIDLY WORSENING
CLINICAL_PROGRESSION: NOT CHANGED

## 2020-09-23 ASSESSMENT — PAIN DESCRIPTION - DESCRIPTORS: DESCRIPTORS: ACHING

## 2020-09-23 ASSESSMENT — PAIN DESCRIPTION - LOCATION
LOCATION: OTHER (COMMENT)
LOCATION: GENERALIZED;FOOT

## 2020-09-23 ASSESSMENT — PAIN DESCRIPTION - FREQUENCY: FREQUENCY: INTERMITTENT

## 2020-09-23 ASSESSMENT — PAIN - FUNCTIONAL ASSESSMENT
PAIN_FUNCTIONAL_ASSESSMENT: PREVENTS OR INTERFERES SOME ACTIVE ACTIVITIES AND ADLS
PAIN_FUNCTIONAL_ASSESSMENT: PREVENTS OR INTERFERES WITH ALL ACTIVE AND SOME PASSIVE ACTIVITIES

## 2020-09-23 ASSESSMENT — PAIN DESCRIPTION - ONSET
ONSET: PROGRESSIVE
ONSET: GRADUAL

## 2020-09-23 ASSESSMENT — PAIN DESCRIPTION - ORIENTATION: ORIENTATION: RIGHT

## 2020-09-23 NOTE — PROGRESS NOTES
GI rounding and requesting patient not receive any narcotic pain medications. Tylenol only for pain at this time.

## 2020-09-23 NOTE — PROGRESS NOTES
[facility]  Office: [address], DO, Rosamaria Ma, DO, Ortega Mar, DO, Lavonne Wilson, DO, Ramo Shepard MD, Yannick Mathur MD, Ismael Dang MD, Trav Tubbs MD, Paula Hay MD, Chica Mcclure MD, Cheikh Steven MD, Sobeida Reed MD, Cal Parekh MD, Francois Salinas DO, Danielle Gardner MD, Isidoro Hunter MD, Rickie Muñoz DO, Gunnar Forte MD,  Francisco Yates DO, Julio Wu MD, Ryan King MD, Juan Dunn, CNP, [facility], CNP, Fransisco Alves, CNP, Zara Silverman, CNS, Dinorah Kim, CNP, Madeline Hernandez, CNP, Kat Gauthier, CNP, Marcus Moreno, CNP, Ana Colvin, CNP, Ricky Minor PA-C, Ellie Hammond, [facility], Charmaine Dolan, CNP, Sue Flores, CNP, Juanito Segundo, CNP, Erika Mcqueen, CNP, Amisha Graves, CNP         [facility]   [address]    Progress Note    9/23/2020    10:15 AM    Name:   Darius Nguyen  MRN:     [id number]     Acct:      [de-identified]   Room:   [address] [place] [address] Day:  4  Admit Date:  9/19/2020 11:24 AM    PCP:   GEOFF Tinsley CNP  Code Status:  Full Code    Subjective:     C/C:   Chief Complaint   Patient presents with    Dizziness    Palpitations     Interval History Status: not changed. Condition is essentially unchanged or slightly improved. Patient's heart rate is better controlled and he has been trialed on oral medications today. Patient's anemia continues to be evaluated by gastroenterology. Patient is apparently scheduled for EGD and colonoscopy tomorrow. We continue to trend H&H in the meantime. Paracentesis results are negative for malignancy however cultures are still pending. Patient remains on Rocephin per ID. No definitive source of infection has yet been identified. Brief History:     9/19 -very poor historian. Known history of A. fib. Known history of alcoholism. Known history of cancer, likely malignant melanoma.   Admitted from the nursing home with near syncope and palpitations. Patient is found to have A. fib with RVR and hypotension patient was treated according to ACLS protocols and cardiology was consulted    9/20 -patient is found to have decreased liver function, ascites, anemia requiring transfusion, elevated white count, and a single positive blood culture. Infectious disease, cardiology, gastroenterology are requested. 9/21 -patient condition has not changed. Patient heart rate and blood pressure is better controlled today. Hemoglobin continues to trend down. Infectious disease continues to follow and is narrowing antimicrobial therapy. We await paracentesis results. 9/22 -transition to oral medications. Await GI recommendations on anemia work-up. Paracentesis results still pending. 9/23 -GI apparently plans scopes for tomorrow. Paracentesis is negative for malignancy, culture results pending. Review of Systems:     Review of Systems   Constitutional: Positive for activity change, appetite change and fatigue. Negative for chills, diaphoresis and fever. HENT: Negative for congestion. Eyes: Negative for visual disturbance. Respiratory: Negative for cough, chest tightness, shortness of breath and wheezing. Cardiovascular: Positive for leg swelling. Negative for chest pain and palpitations. Gastrointestinal: Positive for diarrhea. Negative for abdominal pain, blood in stool, constipation, nausea and vomiting. Genitourinary: Negative for difficulty urinating. Musculoskeletal: Positive for arthralgias. Neurological: Positive for light-headedness. Negative for dizziness, weakness, numbness and headaches. All other systems reviewed and are negative. Medications:      Allergies:  No Known Allergies    Current Meds:   Scheduled Meds:    midodrine  10 mg Oral TID    dilTIAZem  30 mg Oral 4 times per day    ferrous sulfate  325 mg Oral TID WC    escitalopram  20 mg Oral Daily    mirtazapine  15 mg Oral Nightly    magnesium oxide  400 mg Oral Daily    gabapentin  300 mg Oral TID    [START ON 10/5/2020] vitamin D  50,000 Units Oral Q30 Days    pantoprazole  40 mg Oral BID AC    sodium chloride flush  10 mL Intravenous 2 times per day    digoxin  125 mcg Oral Daily    [Held by provider] metoprolol tartrate  12.5 mg Oral BID    apixaban  5 mg Oral BID    cefTRIAXone (ROCEPHIN) IV  1 g Intravenous Q24H     Continuous Infusions:    sodium chloride 75 mL/hr at 20 0842    dilTIAZem (CARDIZEM) 125 mg in dextrose 5% 125 mL infusion Stopped (20)     PRN Meds: oxyCODONE **OR** [DISCONTINUED] oxyCODONE, potassium chloride **OR** potassium alternative oral replacement **OR** potassium chloride, sodium chloride flush, sodium chloride flush    Data:     Past Medical History:   has a past medical history of History of alcoholism ([address] and Hypertension. Social History:   reports that he has been smoking. He has been smoking about 0.50 packs per day. He has never used smokeless tobacco. He reports current alcohol use. He reports that he does not use drugs. Family History: History reviewed. No pertinent family history. Vitals:  BP (!) 121/49   Pulse 112   Temp 98.4 °F (36.9 °C) (Oral)   Resp 16   Ht 5' 10\" (1.778 m)   Wt 116 lb 4.8 oz (52.8 kg)   SpO2 95%   BMI 16.69 kg/m²   Temp (24hrs), Av.4 °F (36.9 °C), Min:98.1 °F (36.7 °C), Max:98.6 °F (37 °C)    No results for input(s): POCGLU in the last 72 hours. I/O (24Hr):     Intake/Output Summary (Last 24 hours) at 2020 1015  Last data filed at 2020 0655  Gross per 24 hour   Intake 2421 ml   Output 750 ml   Net 1671 ml       Labs:  Hematology:  Recent Labs     20  0706 20  0532 20  0447   WBC 14.0* 10.8 10.5   RBC 3.78* 3.57* 3.51*   HGB 8.7* 8.1* 8.1*   HCT 29.3* 28.4* 27.9*   MCV 77.5* 79.6* 79.5*   MCH 23.0* 22.7* 23.1*   MCHC 29.7 28.5 29.0   RDW 18.3* 18.5* 18.6*    310 294   MPV 9.5 9.7 9.6 Chemistry:  Recent Labs     09/21/20  0706 09/22/20  0532 09/23/20  0447   * 132* 133*   K 3.4* 3.7 3.9    106 107   CO2 20 19* 20   GLUCOSE 83 116* 79   BUN 12 11 12   CREATININE 0.48* 0.59* 0.55*   ANIONGAP 9 7* 6*   LABGLOM >60 >60 >60   GFRAA >60 >60 >60   CALCIUM 6.5* 6.6* 6.6*   DIGOXIN  --  0.9  --      No results for input(s): PROT, LABALBU, LABA1C, G4EMVJG, D2JBGEV, FT4, TSH, AST, ALT, LDH, GGT, ALKPHOS, LABGGT, BILITOT, BILIDIR, AMMONIA, AMYLASE, LIPASE, LACTATE, CHOL, HDL, LDLCHOLESTEROL, CHOLHDLRATIO, TRIG, VLDL, TRF58LI, PHENYTOIN, PHENYF, URICACID, POCGLU in the last 72 hours. ABG:No results found for: POCPH, PHART, PH, POCPCO2, DFH0RLT, PCO2, POCPO2, PO2ART, PO2, POCHCO3, OIQ9WUF, HCO3, NBEA, PBEA, BEART, BE, THGBART, THB, HDH7KRY, ZKON7YXW, V1SNZKXV, O2SAT, FIO2  Lab Results   Component Value Date/Time    SPECIAL NOT REPORTED 09/21/2020 12:00 PM     Lab Results   Component Value Date/Time    CULTURE NO GROWTH 15 HOURS 09/21/2020 12:00 PM       Radiology:  Ct Abdomen Pelvis W Iv Contrast    Result Date: 9/19/2020  1. Moderate right and small left pleural effusions with compressive atelectasis at the lung bases in this patient with emphysematous changes. 2.  Prominent gastric fold thickening most compatible with gastritis. 3.  Liver appears slightly lobular. Additionally, abdominal ascites is noted. The constellation of findings are consistent with cirrhosis. 4.  Enlarged prostate. Bladder wall appears thickened although bladder is suboptimally distended. 5.  Atherosclerotic disease. 6.  No bowel obstruction or urinary obstruction although gallstones are present. No evidence of appendicitis. Xr Chest Portable    Result Date: 9/19/2020  1. Right lower lobe airspace opacity potentially due to atelectasis, pneumonia, or aspiration. 2. Bony demineralization partially limits evaluation for fractures. Ct Chest Pulmonary Embolism W Contrast    Result Date: 9/19/2020  1.   No evidence of pulmonary embolism. 2.  Emphysematous changes. 3. small to moderate right pleural effusion with trace left effusion and basilar atelectasis. 4.  Mild ascites upper abdomen. Physical Examination:        Physical Exam  Vitals signs and nursing note reviewed. Constitutional:       General: He is not in acute distress. Appearance: He is well-developed. He is not diaphoretic. Comments: Underweight and malnourished, poor personal hygiene   HENT:      Head: Normocephalic and atraumatic. Right Ear: Hearing normal.      Left Ear: Hearing normal.      Nose: Nose normal. No rhinorrhea. Eyes:      General: Lids are normal.      Extraocular Movements:      Right eye: Normal extraocular motion. Left eye: Normal extraocular motion. Conjunctiva/sclera: Conjunctivae normal.      Right eye: Right conjunctiva is not injected. Left eye: Left conjunctiva is not injected. Pupils: Pupils are equal, round, and reactive to light. Pupils are equal.      Right eye: Pupil is reactive. Left eye: Pupil is reactive. Neck:      Musculoskeletal: Neck supple. Thyroid: No thyromegaly. Vascular: No carotid bruit. Trachea: Trachea and phonation normal. No tracheal deviation. Cardiovascular:      Rate and Rhythm: Normal rate. Rhythm irregular. Pulses: Normal pulses. Heart sounds: Normal heart sounds. No murmur. Pulmonary:      Effort: Pulmonary effort is normal. No respiratory distress. Breath sounds: No stridor. Examination of the right-lower field reveals decreased breath sounds. Decreased breath sounds present. Abdominal:      General: Bowel sounds are normal. There is no distension. Palpations: Abdomen is soft. There is no mass. Tenderness: There is no abdominal tenderness. There is no guarding. Musculoskeletal:         General: No tenderness. Right lower leg: Edema present.       Comments: Left above-knee amputation   Skin: General: Skin is warm and dry. Findings: No erythema, lesion or rash.         Assessment:        Hospital Problems           Last Modified POA    * (Principal) Atrial fibrillation with RVR ([address] 9/19/2020 Yes    Elevated brain natriuretic peptide (BNP) level 9/19/2020 Yes    History of alcoholism ([address] 9/19/2020 Yes    Cirrhosis of liver with ascites ([address] 9/19/2020 Yes    Chronic atrial fibrillation 9/19/2020 Yes    Leukocytosis 9/19/2020 Yes    Anemia 9/21/2020 Yes    Thrombocytosis ([address] 9/19/2020 Yes    Severe malnutrition ([address] 9/19/2020 Yes    Sarcopenia 9/19/2020 Yes    Hyponatremia 9/19/2020 Yes    Sepsis ([address] 9/20/2020 Yes    Malignant melanoma of lower extremity, including hip ([address] 9/20/2020 Yes    Overview Signed 9/20/2020  3:34 PM by GEOFF Rios CNP     Hx of left foot with left groin lymph node positive for melanoma         Dizziness 9/20/2020 Yes    Acute on chronic diastolic heart failure ([address] 9/22/2020 Yes    Acute on chronic congestive heart failure ([address] 9/22/2020 Yes          Plan:          · Continue antibiotics per ID for likely sepsis of unknown origin  · Digoxin and Cardizem per cardiology, transitioning to oral regiment  · Continue to hold anticoagulation pending GI evaluation  · Clear diet today for anticipated EGD and colonoscopy tomorrow  · PT/OT for outpatient recommendations on discharge, anticipate SNF  · Nectar thick diet due to swallow study recommendations  · Blood pressure well controlled today  · Continue to follow paracentesis cultures, cultures pending, negative for malignancy  · Continue to follow blood cultures, single positive culture, likely contaminant, ID on board      GEOFF Adames NP  9/23/2020  10:15 AM

## 2020-09-23 NOTE — PROGRESS NOTES
Physical Therapy  DATE: 2020    NAME: Precious Lai  MRN: [id number]   : 1945    Patient not seen this date for Physical Therapy due to:  [] Blood transfusion in progress  [] Cancel by RN  [] Hemodialysis  [x]  Refusal by Patient  - pt refused PT this date. Encouraged pt to allow PT to help reposition, and still refused. Will continue to follow as appropriate.   [] Spine Precautions   [] Strict Bedrest  [] Surgery  [] Testing      [] Other        [] PT being discontinued at this time. Patient independent. No further needs. [] PT being discontinued at this time as the patient has been transferred to hospice care. No further needs.     Suzie Patel, PT

## 2020-09-23 NOTE — CARE COORDINATION
Discharge planning    Patient discussed during qualify flow rounds. Social work is following for potential placement. Patient has been declining but [facility] stating cant come back due to diet restrictions    At this time awaiting direction on how to proceed. SS has been in discussion with Johann Deras from [address] [place] [address] regarding alternative planning. Sherri Hall was to discuss with supervisor to see if what the final decision will be.      SS did update family and sent referral to check bed availability of [facility]

## 2020-09-23 NOTE — PLAN OF CARE
Problem: Skin Integrity:  Goal: Will show no infection signs and symptoms  Description: Will show no infection signs and symptoms  9/23/2020 1552 by Priya Trevino RN  Note: Waffle mattress on bed and properly inflated   Q2 turn  Meiplex to coccyx    9/23/2020 1551 by Priya Trevino RN  Outcome: Ongoing  9/23/2020 0611 by Artemio Carney RN  Outcome: Ongoing     Problem: Skin Integrity:  Goal: Absence of new skin breakdown  Description: Absence of new skin breakdown  9/23/2020 1551 by Priya Trevino RN  Outcome: Ongoing  9/23/2020 0611 by Artemio Carney RN  Outcome: Ongoing     Problem: Cardiac:  Goal: Ability to maintain vital signs within normal range will improve  Description: Ability to maintain vital signs within normal range will improve  9/23/2020 1551 by Priya Trevino RN  Outcome: Ongoing  9/23/2020 0611 by Artemio Carney RN  Outcome: Ongoing  Intervention: Monitor hemodynamic parameters  Note: Vitals taken every foru hours and as needed     Problem: Cardiac:  Goal: Ability to maintain vital signs within normal range will improve  Description: Ability to maintain vital signs within normal range will improve  Intervention: Monitor hemodynamic parameters  Note: Vitals taken every foru hours and as needed     Problem: Cardiac:  Goal: Cardiovascular alteration will improve  Description: Cardiovascular alteration will improve  9/23/2020 1551 by Priya Trevino RN  Outcome: Ongoing  9/23/2020 0611 by Artemio Carney RN  Outcome: Ongoing     Problem: Health Behavior:  Goal: Will modify at least one risk factor affecting health status  Description: Will modify at least one risk factor affecting health status  Outcome: Ongoing     Problem: Falls - Risk of:  Goal: Absence of physical injury  Description: Absence of physical injury  Outcome: Ongoing     Problem: Falls - Risk of:  Goal: Will remain free from falls  Description: Will remain free from falls  Note: Siderails up x 2  Hourly rounding. Call light in reach. Instructed to call for assist before attempting out of bed. Remains free from falls and accidental injury at this time. Floor free from obstacles, and bed is locked and in lowest position. Adequate lighting provided. Bed alarm on. Fall sticker on wristband.  Fall Sign posted in doorway

## 2020-09-23 NOTE — PLAN OF CARE
Problem: Skin Integrity:  Goal: Will show no infection signs and symptoms  Description: Will show no infection signs and symptoms  Outcome: Ongoing  Goal: Absence of new skin breakdown  Description: Absence of new skin breakdown  Outcome: Ongoing     Problem: Cardiac:  Goal: Ability to maintain vital signs within normal range will improve  Description: Ability to maintain vital signs within normal range will improve  Outcome: Ongoing  Goal: Cardiovascular alteration will improve  Description: Cardiovascular alteration will improve  Outcome: Ongoing

## 2020-09-23 NOTE — CARE COORDINATION
SHANNON called [facility] to follow up on referral, the pt has been denied. SHANNON called pt daughter Cindy Patterson to discuss the discharge plan and encourage her to call [phone] to follow up regarding pt not being able to return. Cindy Patterson reports she will call them and call SHANNON back with an update. Cindy Patterson will ask her brother to talk with pt about possibly going to a SNF short term. Cindy Patterson reports speaking with pt yesterday regarding short stay at SNF, pt is stubborn and refusing to go to one. Neptalijoshua Leonardo reports the pt cannot stay with family at this time.   Cindy Patterson will call  back with updates

## 2020-09-23 NOTE — PROGRESS NOTES
[facility]    Gastroenterology Daily Progress Note      Patient:   Marco Sanderson   :    1945   Facility[facility]   [facility]  Date:     2020  Consultant:   Zayra Khoury CNP      SUBJECTIVE  77 y.o. male admitted 2020 with Atrial fibrillation with RVR ([facility] [address] [I48.91]  Atrial fibrillation with RVR ([address] [I48.91] and seen for anemia with cirrhosis. The pt was seen and examined. He is lethargic not answering questions. RN reports that he received narcotic pain medication around 12 pm today. No BM overnight.          OBJECTIVE  Scheduled Meds:   midodrine  10 mg Oral TID    dilTIAZem  30 mg Oral 4 times per day    ferrous sulfate  325 mg Oral TID WC    escitalopram  20 mg Oral Daily    mirtazapine  15 mg Oral Nightly    magnesium oxide  400 mg Oral Daily    gabapentin  300 mg Oral TID    [START ON 10/5/2020] vitamin D  50,000 Units Oral Q30 Days    pantoprazole  40 mg Oral BID AC    sodium chloride flush  10 mL Intravenous 2 times per day    digoxin  125 mcg Oral Daily    [Held by provider] metoprolol tartrate  12.5 mg Oral BID    apixaban  5 mg Oral BID    cefTRIAXone (ROCEPHIN) IV  1 g Intravenous Q24H       Vital Signs:  BP (!) 101/54   Pulse 110   Temp 98.3 °F (36.8 °C) (Oral)   Resp 16   Ht 5' 10\" (1.778 m)   Wt 116 lb 4.8 oz (52.8 kg)   SpO2 95%   BMI 16.69 kg/m²      Physical Exam:     General Appearance: ill appearing lethargic and oriented to person,  well-developed and mal-nourished, in no acute distress  Skin: warm and dry, no rash or erythema  Head: normocephalic and atraumatic  Eyes: pupils equal, round, and reactive to light, extraocular eye movements intact, conjunctivae normal  ENT: hearing grossly normal bilaterally  Neck: neck supple and non tender without mass, no thyromegaly or thyroid nodules, no cervical lymphadenopathy   Pulmonary/Chest: clear to auscultation bilaterally- no wheezes, rales or rhonchi, normal air movement, no respiratory distress  Cardiovascular: tachycardic rate, regular rhythm, normal S1 and S2, no murmurs, rubs, clicks or gallops, distal pulses intact, no carotid bruits  Abdomen: soft, non-tender, non-distended, normal bowel sounds, no masses or organomegaly  Extremities: no cyanosis, clubbing or edema  Musculoskeletal: normal range of motion, no joint swelling, deformity or tenderness  Neurologic: no cranial nerve deficit and muscle strength normal       Lab and Imaging Review     CBC  Recent Labs     09/21/20  0706 09/22/20  0532 09/23/20  0447   WBC 14.0* 10.8 10.5   HGB 8.7* 8.1* 8.1*   HCT 29.3* 28.4* 27.9*   MCV 77.5* 79.6* 79.5*    310 294       BMP  Recent Labs     09/21/20  0706 09/22/20  0532 09/23/20  0447   * 132* 133*   K 3.4* 3.7 3.9    106 107   CO2 20 19* 20   BUN 12 11 12   CREATININE 0.48* 0.59* 0.55*   GLUCOSE 83 116* 79   CALCIUM 6.5* 6.6* 6.6*       ANEMIA STUDIES  Recent Labs     09/20/20  1817   LABIRON 27   TIBC 98*   FERRITIN 79   [id number] >2000*   FOLATE 2.0*   Results for Teofilo Picking (MRN [id number]) as of 9/23/2020 15:24   Ref. Range 9/20/2020 18:17   CMV IgG Latest Ref Range: <0.9  26.8 (H)   CMV IgM Latest Ref Range: <0.9  0.2     Results for Delorisann Picking (MRN [id number]) as of 9/22/2020 17:14    Ref. Range 9/20/2020 18:17   BRET Latest Ref Range: NEGATIVE  NEGATIVE   Smooth Muscle Ab Latest Ref Range: NONE DETECTED  1:20 (A)   Results for Teofilo Picking (MRN [id number]) as of 9/21/2020 09:41    Ref. Range 9/20/2020 18:17   A-1 Antitrypsin Latest Ref Range: 90 - 200 mg/dL 167   Results for Delorisann Picking (MRN [id number]) as of 9/21/2020 09:41    Ref. Range 9/20/2020 18:17   Ceruloplasmin Latest Ref Range: 15 - 30 mg/dL 14 (L)   Results for Teofilo Mariee (MRN [id number]) as of 9/21/2020 09:41    Ref.  Range 9/20/2020 18:17   IgA Latest Ref Range: 70 - 400 mg/dL 350   Hep A IgM Latest Ref Range: NONREACTIVE  NONREACTIVE   Hepatitis B Surface Ag Latest Ref Range: NONREACTIVE NONREACTIVE   Hepatitis C Ab Latest Ref Range: NONREACTIVE  NONREACTIVE   Hep B Core Ab, IgM Latest Ref Range: NONREACTIVE  NONREACTIVE   Results for Diana Mack (MRN [id number]) as of 9/21/2020 09:41    Ref. Range 9/20/2020 18:17   AFP (Alpha Fetoprotein) Latest Ref Range: <8.4 ug/L 1.6       Results for Diana Mack (MRN [id number]) as of 9/22/2020 17:14 paracentesis    Ref. Range 9/21/2020 12:00   Appearance, Fluid Unknown NOT REPORTED   Basos, Fluid Latest Ref Range: 0 % NOT REPORTED   Color, Fluid Unknown NOT REPORTED   Eos, Fluid Latest Ref Range: 0 % NOT REPORTED   Glucose, Fluid Latest Units: mg/dL 89   LD, Fluid Latest Units: U/L 49   RBC, Fluid Latest Units: /mm3 <3,000   Lymphocytes, Body Fluid Latest Units: % 20   Monocyte Count, Fluid Latest Units: % NOT REPORTED   Neutrophil Count, Fluid Latest Units: % 67   Total Protein, Body Fluid Latest Units: g/dL <1.0   WBC, Fluid Latest Units: /mm3 43   Amylase, Fluid Latest Units: U/L 7   Albumin, Fluid Latest Units: g/dL 0.2   Other Cells, Fluid Latest Units: % MONOCYTES      Ct Abdomen Pelvis W Iv Contrast     Result Date: 9/19/2020  EXAMINATION: CT OF THE ABDOMEN AND PELVIS WITH CONTRAST 9/19/2020 12:27 pm  Initial FINDINGS: Lower Chest: Mild to moderate right pleural effusion.  Small left pleural effusion.  Emphysematous changes in the lungs.  Compressive atelectasis both lung bases.  Coronary artery calcification. Organs: Liver contains several subcentimeter hypodensities near the dome right lobe consistent with cysts.  Liver is slightly lobular in contour.  The spleen, pancreas, and adrenals are unremarkable.  Gallstones are noted within the gallbladder.  Kidneys demonstrate no evidence of hydronephrosis or nephrolithiasis. GI/Bowel: Small amount of upper abdominal ascites and haziness in the mesentery is noted.  No free air or bowel obstruction is noted.  Gastric folds are prominent and thickened consistent with gastritis.  Pelvis: No evidence of appendicitis.  Small amount of pelvic fluid.  Bladder is decompressed limiting assessment but bladder wall appears prominent. Prostate is enlarged.  Bladder wall thickening may be related to hypertrophy. No inguinal adenopathy is noted.  Atherosclerotic calcification of the iliac vessels is noted, moderately significant. Peritoneum/Retroperitoneum: No aortic aneurysm.  Diffuse atherosclerotic calcification of the aorta is noted, mild to moderate.  An Bones/Soft Tissues: No acute osseous or soft tissue abnormality.      1.  Moderate right and small left pleural effusions with compressive atelectasis at the lung bases in this patient with emphysematous changes. 2.  Prominent gastric fold thickening most compatible with gastritis. 3.  Liver appears slightly lobular.  Additionally, abdominal ascites is noted.  The constellation of findings are consistent with cirrhosis. 4.  Enlarged prostate.  Bladder wall appears thickened although bladder is suboptimally distended. 5.  Atherosclerotic disease. 6.  No bowel obstruction or urinary obstruction although gallstones are present.  No evidence of appendicitis.        ASSESSMENT/PLAN:  1. Anemia; no overt bleeding  -clear diet today and will discuss egd and colonoscopy for tomorrow with md, pt is currently lethargic possibly from narcotic pain meds doubt he could drink prep for colonoscopy, will defer endoscopy until Friday, d/w md  -hold the [facility]  -continue ppi  -trend hh and keep hgb >7    2.cirrhosis with ascites pt is lethargic today  -s/p paracentesis culture negative so far  -recommend no narcotics as these can mask encephalopathy, can have up to 2 gm of tylenol per day  -EBV still pending         3.possible pneumonia/sepsis mgt per ID      This plan was formulated in collaboration with Dr. Surinder Holloway.     Electronically signed by: GEOFF Maddox - CNP on 9/23/2020 at 3:19 PM

## 2020-09-24 LAB
ABSOLUTE EOS #: <0.03 K/UL (ref 0–0.44)
ABSOLUTE IMMATURE GRANULOCYTE: 0.2 K/UL (ref 0–0.3)
ABSOLUTE LYMPH #: 1.65 K/UL (ref 1.1–3.7)
ABSOLUTE MONO #: 0.75 K/UL (ref 0.1–1.2)
ANION GAP SERPL CALCULATED.3IONS-SCNC: 6 MMOL/L (ref 9–17)
BASOPHILS # BLD: 0 % (ref 0–2)
BASOPHILS ABSOLUTE: <0.03 K/UL (ref 0–0.2)
BUN BLDV-MCNC: 15 MG/DL (ref 8–23)
BUN/CREAT BLD: 28 (ref 9–20)
CALCIUM SERPL-MCNC: 7 MG/DL (ref 8.6–10.4)
CHLORIDE BLD-SCNC: 107 MMOL/L (ref 98–107)
CO2: 21 MMOL/L (ref 20–31)
CREAT SERPL-MCNC: 0.53 MG/DL (ref 0.7–1.2)
DIFFERENTIAL TYPE: ABNORMAL
EBV EARLY ANTIGEN AB, IGG: 7.5 U/ML (ref 0–10.9)
EBV NUCLEAR AG AB: >600 U/ML (ref 0–21.9)
EOSINOPHILS RELATIVE PERCENT: 0 % (ref 1–4)
EPSTEIN-BARR VCA IGG: >750 U/ML (ref 0–21.9)
EPSTEIN-BARR VCA IGM: <10 U/ML (ref 0–43.9)
GFR AFRICAN AMERICAN: >60 ML/MIN
GFR NON-AFRICAN AMERICAN: >60 ML/MIN
GFR SERPL CREATININE-BSD FRML MDRD: ABNORMAL ML/MIN/{1.73_M2}
GFR SERPL CREATININE-BSD FRML MDRD: ABNORMAL ML/MIN/{1.73_M2}
GLUCOSE BLD-MCNC: 100 MG/DL (ref 70–99)
HCT VFR BLD CALC: 29.6 % (ref 40.7–50.3)
HEMOGLOBIN: 8.5 G/DL (ref 13–17)
IMMATURE GRANULOCYTES: 2 %
LIVER-KIDNEY MICROSOMAL AB: NORMAL
LYMPHOCYTES # BLD: 17 % (ref 24–43)
MCH RBC QN AUTO: 22.8 PG (ref 25.2–33.5)
MCHC RBC AUTO-ENTMCNC: 28.7 G/DL (ref 28.4–34.8)
MCV RBC AUTO: 79.6 FL (ref 82.6–102.9)
MITOCHONDRIAL ANTIBODY: 2.8 UNITS (ref 0–24.9)
MONOCYTES # BLD: 8 % (ref 3–12)
NRBC AUTOMATED: 0 PER 100 WBC
PDW BLD-RTO: 18.7 % (ref 11.8–14.4)
PLATELET # BLD: 317 K/UL (ref 138–453)
PLATELET ESTIMATE: ABNORMAL
PMV BLD AUTO: 9.7 FL (ref 8.1–13.5)
POTASSIUM SERPL-SCNC: 4.3 MMOL/L (ref 3.7–5.3)
RBC # BLD: 3.72 M/UL (ref 4.21–5.77)
RBC # BLD: ABNORMAL 10*6/UL
SEG NEUTROPHILS: 73 % (ref 36–65)
SEGMENTED NEUTROPHILS ABSOLUTE COUNT: 7.3 K/UL (ref 1.5–8.1)
SODIUM BLD-SCNC: 134 MMOL/L (ref 135–144)
WBC # BLD: 9.9 K/UL (ref 3.5–11.3)
WBC # BLD: ABNORMAL 10*3/UL

## 2020-09-24 PROCEDURE — 6360000002 HC RX W HCPCS: Performed by: NURSE PRACTITIONER

## 2020-09-24 PROCEDURE — 85025 COMPLETE CBC W/AUTO DIFF WBC: CPT

## 2020-09-24 PROCEDURE — 99232 SBSQ HOSP IP/OBS MODERATE 35: CPT | Performed by: INTERNAL MEDICINE

## 2020-09-24 PROCEDURE — 6370000000 HC RX 637 (ALT 250 FOR IP): Performed by: NURSE PRACTITIONER

## 2020-09-24 PROCEDURE — APPNB30 APP NON BILLABLE TIME 0-30 MINS: Performed by: NURSE PRACTITIONER

## 2020-09-24 PROCEDURE — 2060000000 HC ICU INTERMEDIATE R&B

## 2020-09-24 PROCEDURE — 99232 SBSQ HOSP IP/OBS MODERATE 35: CPT | Performed by: NURSE PRACTITIONER

## 2020-09-24 PROCEDURE — 6370000000 HC RX 637 (ALT 250 FOR IP): Performed by: FAMILY MEDICINE

## 2020-09-24 PROCEDURE — 2580000003 HC RX 258: Performed by: FAMILY MEDICINE

## 2020-09-24 PROCEDURE — 80048 BASIC METABOLIC PNL TOTAL CA: CPT

## 2020-09-24 PROCEDURE — 97530 THERAPEUTIC ACTIVITIES: CPT

## 2020-09-24 PROCEDURE — 36415 COLL VENOUS BLD VENIPUNCTURE: CPT

## 2020-09-24 PROCEDURE — 6370000000 HC RX 637 (ALT 250 FOR IP): Performed by: INTERNAL MEDICINE

## 2020-09-24 RX ORDER — MORPHINE SULFATE 2 MG/ML
2 INJECTION, SOLUTION INTRAMUSCULAR; INTRAVENOUS ONCE
Status: COMPLETED | OUTPATIENT
Start: 2020-09-24 | End: 2020-09-24

## 2020-09-24 RX ADMIN — POLYETHYLENE GLYCOL 3350, SODIUM SULFATE ANHYDROUS, SODIUM BICARBONATE, SODIUM CHLORIDE, POTASSIUM CHLORIDE 2000 ML: 236; 22.74; 6.74; 5.86; 2.97 POWDER, FOR SOLUTION ORAL at 17:30

## 2020-09-24 RX ADMIN — MIDODRINE HYDROCHLORIDE 10 MG: 10 TABLET ORAL at 08:36

## 2020-09-24 RX ADMIN — MIDODRINE HYDROCHLORIDE 10 MG: 10 TABLET ORAL at 20:36

## 2020-09-24 RX ADMIN — MIDODRINE HYDROCHLORIDE 10 MG: 10 TABLET ORAL at 14:34

## 2020-09-24 RX ADMIN — FERROUS SULFATE TAB EC 325 MG (65 MG FE EQUIVALENT) 325 MG: 325 (65 FE) TABLET DELAYED RESPONSE at 14:34

## 2020-09-24 RX ADMIN — FERROUS SULFATE TAB EC 325 MG (65 MG FE EQUIVALENT) 325 MG: 325 (65 FE) TABLET DELAYED RESPONSE at 08:47

## 2020-09-24 RX ADMIN — PANTOPRAZOLE SODIUM 40 MG: 40 TABLET, DELAYED RELEASE ORAL at 06:06

## 2020-09-24 RX ADMIN — GABAPENTIN 300 MG: 300 CAPSULE ORAL at 20:36

## 2020-09-24 RX ADMIN — MIRTAZAPINE 15 MG: 15 TABLET, FILM COATED ORAL at 20:36

## 2020-09-24 RX ADMIN — DIGOXIN 125 MCG: 125 TABLET ORAL at 08:36

## 2020-09-24 RX ADMIN — DILTIAZEM HYDROCHLORIDE 30 MG: 30 TABLET, FILM COATED ORAL at 20:36

## 2020-09-24 RX ADMIN — GABAPENTIN 300 MG: 300 CAPSULE ORAL at 08:36

## 2020-09-24 RX ADMIN — GABAPENTIN 300 MG: 300 CAPSULE ORAL at 14:34

## 2020-09-24 RX ADMIN — DILTIAZEM HYDROCHLORIDE 30 MG: 30 TABLET, FILM COATED ORAL at 02:01

## 2020-09-24 RX ADMIN — MAGNESIUM GLUCONATE 500 MG ORAL TABLET 400 MG: 500 TABLET ORAL at 08:36

## 2020-09-24 RX ADMIN — Medication 10 ML: at 08:36

## 2020-09-24 RX ADMIN — ESCITALOPRAM OXALATE 20 MG: 10 TABLET ORAL at 08:36

## 2020-09-24 RX ADMIN — DILTIAZEM HYDROCHLORIDE 30 MG: 30 TABLET, FILM COATED ORAL at 08:36

## 2020-09-24 RX ADMIN — PANTOPRAZOLE SODIUM 40 MG: 40 TABLET, DELAYED RELEASE ORAL at 17:30

## 2020-09-24 RX ADMIN — DILTIAZEM HYDROCHLORIDE 30 MG: 30 TABLET, FILM COATED ORAL at 14:34

## 2020-09-24 RX ADMIN — FERROUS SULFATE TAB EC 325 MG (65 MG FE EQUIVALENT) 325 MG: 325 (65 FE) TABLET DELAYED RESPONSE at 17:31

## 2020-09-24 RX ADMIN — Medication 10 ML: at 20:41

## 2020-09-24 RX ADMIN — MORPHINE SULFATE 2 MG: 2 INJECTION, SOLUTION INTRAMUSCULAR; INTRAVENOUS at 02:00

## 2020-09-24 ASSESSMENT — PAIN SCALES - GENERAL
PAINLEVEL_OUTOF10: 4
PAINLEVEL_OUTOF10: 3
PAINLEVEL_OUTOF10: 4
PAINLEVEL_OUTOF10: 4
PAINLEVEL_OUTOF10: 8

## 2020-09-24 ASSESSMENT — PAIN DESCRIPTION - ORIENTATION: ORIENTATION: OTHER (COMMENT)

## 2020-09-24 ASSESSMENT — ENCOUNTER SYMPTOMS
NAUSEA: 0
COLOR CHANGE: 0
COUGH: 0
WHEEZING: 0
RESPIRATORY NEGATIVE: 1
CONSTIPATION: 0
ABDOMINAL PAIN: 0
SHORTNESS OF BREATH: 0
GASTROINTESTINAL NEGATIVE: 1
BLOOD IN STOOL: 0
DIARRHEA: 1
SINUS PRESSURE: 0
CHEST TIGHTNESS: 0
SINUS PAIN: 0
VOMITING: 0

## 2020-09-24 ASSESSMENT — PAIN DESCRIPTION - PROGRESSION
CLINICAL_PROGRESSION: NOT CHANGED
CLINICAL_PROGRESSION: NOT CHANGED
CLINICAL_PROGRESSION: OTHER (COMMENT)
CLINICAL_PROGRESSION: NOT CHANGED
CLINICAL_PROGRESSION: OTHER (COMMENT)
CLINICAL_PROGRESSION: NOT CHANGED
CLINICAL_PROGRESSION: OTHER (COMMENT)
CLINICAL_PROGRESSION: OTHER (COMMENT)
CLINICAL_PROGRESSION: NOT CHANGED
CLINICAL_PROGRESSION: NOT CHANGED

## 2020-09-24 ASSESSMENT — PAIN DESCRIPTION - DESCRIPTORS: DESCRIPTORS: ACHING;BURNING

## 2020-09-24 ASSESSMENT — PAIN - FUNCTIONAL ASSESSMENT: PAIN_FUNCTIONAL_ASSESSMENT: PREVENTS OR INTERFERES WITH MANY ACTIVE NOT PASSIVE ACTIVITIES

## 2020-09-24 ASSESSMENT — PAIN DESCRIPTION - PAIN TYPE: TYPE: ACUTE PAIN;CHRONIC PAIN

## 2020-09-24 ASSESSMENT — PAIN DESCRIPTION - ONSET: ONSET: PROGRESSIVE

## 2020-09-24 ASSESSMENT — PAIN DESCRIPTION - FREQUENCY: FREQUENCY: INTERMITTENT

## 2020-09-24 ASSESSMENT — PAIN DESCRIPTION - LOCATION: LOCATION: GENERALIZED

## 2020-09-24 NOTE — CARE COORDINATION
SW met with pt to follow up on SNF choices, pt reports he did not review the list yet. SW went over the SNF list with pt to discuss options. Pt did not answer and just looked at [facility] explained pt daughter requested a referral to [facility] but pt was denied. Pt daughter also suggested [facility]- pt is agreeable to referral to [facility].

## 2020-09-24 NOTE — CARE COORDINATION
SHANNON received call from Franny Pa with [facility] regarding the pt has been accepted - will need OT note to start the insurance pre-cert.

## 2020-09-24 NOTE — PROGRESS NOTES
Physical Therapy  Facility/Department: [facility] PROGRESSIVE CARE  Daily Treatment Note  NAME: Daniela Benedict  : 1945  MRN: [id number]    Date of Service: 2020    Discharge Recommendations:  [address] RN reports patient is medically stable for therapy treatment this date. Chart reviewed prior to treatment and patient is agreeable for therapy. All lines intact and patient positioned comfortably at end of treatment. All patient needs addressed prior to ending therapy session. Assessment   Body structures, Functions, Activity limitations: Decreased functional mobility ; Decreased endurance;Decreased strength;Decreased balance; Increased pain;Decreased safe awareness  Assessment: Pt limited this date due to pain. Unable to assess all of bed mobility/transfers due to pt being in too much pain. Will continue to assess as appropriate. Specific instructions for Next Treatment: if tolerable, assess supine<>sit  Prognosis: Fair  Clinical Presentation: evolving  PT Education: Goals;PT Role;Functional Mobility Training;Plan of Care;General Safety;Pressure Relief;Home Exercise Program  Patient Education: Emphasis on exercises while in bed to improve strength  REQUIRES PT FOLLOW UP: Yes  Activity Tolerance  Activity Tolerance: Patient limited by pain; Patient limited by fatigue  Activity Tolerance: Pt stated throughout session that he was in a lot of generalized pain. Patient Diagnosis(es): The primary encounter diagnosis was Dizziness. Diagnoses of Atrial fibrillation with RVR ([facility] [address] and Acute on chronic congestive heart failure, unspecified heart failure type [facility]) were also pertinent to this visit. has a past medical history of History of alcoholism ([facility] [address] and Hypertension. has a past surgical history that includes hernia repair and Hydrocele surgery.     Restrictions  Restrictions/Precautions  Restrictions/Precautions: Fall Risk, Up as Tolerated, Contact hand. Attempted to reposition pt's trunk in bed as he tends to lean to the right. With max assist, pt was able to long sit in bed, but started yelling stating it hurt his back to sit like that. Transfers  Comment: Unsafe to assess this date. Ambulation  Ambulation?: No(pt non-ambulatory at baseline)        Exercises  Comments: Attempted exercises with pt this date, although he stated he was in too much pain to finish. Pt demo'd 10 R ankle pumps, and 3 quad sets on RLE. AROM RLE (degrees)  RLE General AROM: Pt would not let PT assess RLE this date, stating it hurt too much. OutComes Score  AM-PAC Score  AM-PAC Inpatient Mobility Raw Score : 8 (09/24/20 1433)  AM-PAC Inpatient T-Scale Score : 28.52 (09/24/20 1433)  Mobility Inpatient CMS 0-100% Score: 86.62 (09/24/20 1433)  Mobility Inpatient CMS G-Code Modifier : CM (09/24/20 1433)          Goals  Short term goals  Time Frame for Short term goals: 12 visits  Short term goal 1: Pt will roll left<>right with SBA and use of handrails  Short term goal 2: Pt will scoot self in bed with SBA in order to reposition self. Short term goal 3: Pt will tolerate 25mins of PT to improve activity tolerance, strength, and functional mobility  Patient Goals   Patient goals : To get better    Plan    Plan  Times per week: 1-2x day / 5-6 days per week  Specific instructions for Next Treatment: if tolerable, assess supine<>sit  Current Treatment Recommendations: Strengthening, Functional Mobility Training, Positioning, Safety Education & Training, Endurance Training  Safety Devices  Type of devices:  All fall risk precautions in place, Bed alarm in place, Call light within reach, Nurse notified, Left in bed     Therapy Time   Individual Concurrent Group Co-treatment   Time In 1403         Time Out 1417         Minutes 14                 Suzie Patel [place]

## 2020-09-24 NOTE — PROGRESS NOTES
Patient called out again complaining that his foot is killing him. Morphine was given at about 2am and he was quietly resting until now. RN tried to reposition, but patient doesn't tolerate repositioning well. RN will continue to monitor and reassess. Patient is currently quiet and not complaining after RN talked with him and assured him Dr. Valdivia Led be by in the am to assess his foot.

## 2020-09-24 NOTE — CARE COORDINATION
Discharge planning    Jp Thornton NP notified writer that he had a long conversation ( 30 minutes) regarding patient plan of care. He states patient is now agreeable to snf placement. Call to ss and notified. NP also asked writer to follow up with ID regarding atb poc for discharge.  Will also discuss in rounds with RN

## 2020-09-24 NOTE — PROGRESS NOTES
[facility]  Office: [address], DO, Annita Jeison, DO, Juventino Manifold, DO, Kevin Young Blood, DO, Cynthia Mascorro MD, Antoine Medel MD, Lissette Holder MD, Clem Arriaga MD, Philip Myles MD, Piper Muller MD, Amalia Rey MD, Osvaldo Schultz MD, Mbkelsey Ackerman MD, Kevin Matson, DO, Lili Schulz MD, Cali Nuno MD, Roxanne Hassan DO, Anca Moore MD,  Mary Brewer DO, Norris Holter, MD, Bora Gonsalves MD, Ladan Barber, CNP, [facility], CNP, Yuridia Ferreira, CNP, Serafin Moreno, CNS, Toro Malone, CNP, Darron Vasquez, CNP, Jennifer Smith, CNP, Jeaneth Bains, CNP, Clarisa Wilhelm, CNP, Reza Fowler PA-C, Darien Carroll DNP, Alix Aguilera, CNP, Gabriela Jefferson, CNP, Austyn Giles, CNP, Jaron Dee, CNP, Salome Johansen, CNP         [address]    Progress Note    9/24/2020    12:08 PM    Name:   Waldron Boast  MRN:     [id number]     Acct:      [de-identified]   Room:   [address] [place] [address] Day:  5  Admit Date:  9/19/2020 11:24 AM    PCP:   GEOFF Douglass CNP  Code Status:  Full Code    Subjective:     C/C:   Chief Complaint   Patient presents with    Dizziness    Palpitations     Interval History Status: not changed. Condition is essentially stable from yesterday's exam.  Patient continues to be in relatively poor health. Extensive discussion is held with the patient who is interactive with this provider. Options for care discussed. Patient expresses understanding that his overall health is deteriorating. Patient is not interested in palliative care or hospice. Patient expresses that he desires to get better explained to the patient that he must be compliant with medical recommendations and that he must engage with PT/OT. Patient states that he understands and will engage with therapy. Patient is agreeable at this time to skilled nursing facility for continued rehabilitation.   As patient's hemoglobin is stable and he apparently refused the bowel prep we will defer the decision to complete EGD and colonoscopy as an inpatient versus follow-up as an outpatient to gastroenterology. Infectious disease has de-escalate it antibiotic therapy as no clear source for sepsis has been identified but he did respond well to antimicrobial therapy. Cardiology has signed off as his heart rate is well controlled and blood pressure stable. We anticipate discharge to skilled nursing facility once arrangements can be made. Brief History:     9/19 -very poor historian. Known history of A. fib. Known history of alcoholism. Known history of cancer, likely malignant melanoma. Admitted from the nursing home with near syncope and palpitations. Patient is found to have A. fib with RVR and hypotension patient was treated according to ACLS protocols and cardiology was consulted    9/20 -patient is found to have decreased liver function, ascites, anemia requiring transfusion, elevated white count, and a single positive blood culture. Infectious disease, cardiology, gastroenterology are requested. 9/21 -patient condition has not changed. Patient heart rate and blood pressure is better controlled today. Hemoglobin continues to trend down. Infectious disease continues to follow and is narrowing antimicrobial therapy. We await paracentesis results. 9/22 -transition to oral medications. Await GI recommendations on anemia work-up. Paracentesis results still pending. 9/23 -GI apparently plans scopes for tomorrow. Paracentesis is negative for malignancy, culture results pending. 9/24 -patient condition essentially stable. Cardiology and infectious disease have de-escalate therapy. Anticipate discharge to skilled nursing facility for continued rehabilitation. Review of Systems:     Review of Systems   Constitutional: Positive for activity change, appetite change and fatigue. Negative for chills, diaphoresis and fever. HENT: Negative for congestion, sinus pressure and sinus pain. Eyes: Negative for visual disturbance. Respiratory: Negative for cough, chest tightness, shortness of breath and wheezing. Cardiovascular: Positive for leg swelling. Negative for chest pain and palpitations. Gastrointestinal: Positive for diarrhea. Negative for abdominal pain, blood in stool, constipation, nausea and vomiting. Endocrine: Negative for cold intolerance and heat intolerance. Genitourinary: Negative for difficulty urinating, flank pain and frequency. Musculoskeletal: Positive for arthralgias. Skin: Positive for pallor. Negative for color change. Neurological: Positive for weakness and light-headedness. Negative for dizziness, numbness and headaches. Psychiatric/Behavioral: Negative for agitation and confusion. All other systems reviewed and are negative. Medications:      Allergies:  No Known Allergies    Current Meds:   Scheduled Meds:    midodrine  10 mg Oral TID    dilTIAZem  30 mg Oral 4 times per day    ferrous sulfate  325 mg Oral TID WC    escitalopram  20 mg Oral Daily    mirtazapine  15 mg Oral Nightly    magnesium oxide  400 mg Oral Daily    gabapentin  300 mg Oral TID    [START ON 10/5/2020] vitamin D  50,000 Units Oral Q30 Days    pantoprazole  40 mg Oral BID AC    sodium chloride flush  10 mL Intravenous 2 times per day    digoxin  125 mcg Oral Daily    [Held by provider] metoprolol tartrate  12.5 mg Oral BID    apixaban  5 mg Oral BID     Continuous Infusions:    sodium chloride 75 mL/hr at 09/23/20 0842    dilTIAZem (CARDIZEM) 125 mg in dextrose 5% 125 mL infusion Stopped (09/22/20 2027)     PRN Meds: oxyCODONE **OR** [DISCONTINUED] oxyCODONE, potassium chloride **OR** potassium alternative oral replacement **OR** potassium chloride, sodium chloride flush, sodium chloride flush    Data:     Past Medical History:   has a past medical history of History of alcoholism ([facility] [address] and Hypertension. Social History:   reports that he has been smoking. He has been smoking about 0.50 packs per day. He has never used smokeless tobacco. He reports current alcohol use. He reports that he does not use drugs. Family History: History reviewed. No pertinent family history. Vitals:  /60   Pulse 76   Temp 97.7 °F (36.5 °C) (Oral)   Resp 16   Ht 5' 10\" (1.778 m)   Wt 116 lb 4.8 oz (52.8 kg)   SpO2 97%   BMI 16.69 kg/m²   Temp (24hrs), Av.1 °F (36.7 °C), Min:97.7 °F (36.5 °C), Max:98.6 °F (37 °C)    No results for input(s): POCGLU in the last 72 hours. I/O (24Hr): Intake/Output Summary (Last 24 hours) at 2020 1208  Last data filed at 2020 0606  Gross per 24 hour   Intake 2947 ml   Output 350 ml   Net 2597 ml       Labs:  Hematology:  Recent Labs     20  0532 20  0447 20  0502   WBC 10.8 10.5 9.9   RBC 3.57* 3.51* 3.72*   HGB 8.1* 8.1* 8.5*   HCT 28.4* 27.9* 29.6*   MCV 79.6* 79.5* 79.6*   MCH 22.7* 23.1* 22.8*   MCHC 28.5 29.0 28.7   RDW 18.5* 18.6* 18.7*    294 317   MPV 9.7 9.6 9.7     Chemistry:  Recent Labs     20  0532 20  0447 20  1152 20  0502   * 133*  --  134*   K 3.7 3.9  --  4.3    107  --  107   CO2 19* 20  --  21   GLUCOSE 116* 79  --  100*   BUN 11 12  --  15   CREATININE 0.59* 0.55*  --  0.53*   ANIONGAP 7* 6*  --  6*   LABGLOM >60 >60  --  >60   GFRAA >60 >60  --  >60   CALCIUM 6.6* 6.6*  --  7.0*   CAION  --   --  1.14  --    DIGOXIN 0.9  --   --   --      No results for input(s): PROT, LABALBU, LABA1C, E9OVJBA, J6VVHXT, FT4, TSH, AST, ALT, LDH, GGT, ALKPHOS, LABGGT, BILITOT, BILIDIR, AMMONIA, AMYLASE, LIPASE, LACTATE, CHOL, HDL, LDLCHOLESTEROL, CHOLHDLRATIO, TRIG, VLDL, VYR48PE, PHENYTOIN, PHENYF, URICACID, POCGLU in the last 72 hours.   ABG:No results found for: POCPH, PHART, PH, POCPCO2, HBN3NIP, PCO2, POCPO2, PO2ART, PO2, POCHCO3, XIX6ZEV, HCO3, NBEA, PBEA, BEART, BE, THGBART, THB, Conjunctiva/sclera: Conjunctivae normal.      Right eye: Right conjunctiva is not injected. Left eye: Left conjunctiva is not injected. Pupils: Pupils are equal, round, and reactive to light. Pupils are equal.      Right eye: Pupil is reactive. Left eye: Pupil is reactive. Neck:      Musculoskeletal: Neck supple. Thyroid: No thyromegaly. Vascular: No carotid bruit. Trachea: Trachea and phonation normal. No tracheal deviation. Cardiovascular:      Rate and Rhythm: Normal rate. Rhythm irregular. Pulses: Normal pulses. Heart sounds: Normal heart sounds. No murmur. Pulmonary:      Effort: Pulmonary effort is normal. No respiratory distress. Breath sounds: No stridor. Examination of the right-lower field reveals decreased breath sounds. Decreased breath sounds present. Abdominal:      General: Bowel sounds are normal. There is no distension. Palpations: Abdomen is soft. There is no mass. Tenderness: There is no abdominal tenderness. There is no guarding. Musculoskeletal:         General: No tenderness. Right lower leg: Edema present. Comments: Left above-knee amputation   Skin:     General: Skin is warm and dry. Findings: No erythema, lesion or rash.         Assessment:        Hospital Problems           Last Modified POA    * (Principal) Atrial fibrillation with RVR ([address] 9/19/2020 Yes    Elevated brain natriuretic peptide (BNP) level 9/19/2020 Yes    History of alcoholism ([address] 9/19/2020 Yes    Cirrhosis of liver with ascites ([address] 9/19/2020 Yes    Chronic atrial fibrillation 9/19/2020 Yes    Leukocytosis 9/19/2020 Yes    Anemia 9/21/2020 Yes    Thrombocytosis ([address] 9/19/2020 Yes    Severe malnutrition ([address] 9/19/2020 Yes    Sarcopenia 9/19/2020 Yes    Hyponatremia 9/19/2020 Yes    Sepsis ([address] 9/20/2020 Yes    Malignant melanoma of lower extremity, including hip ([address] 9/20/2020 Yes    Overview Signed 9/20/2020  3:34 PM by Kathrin Bryan

## 2020-09-24 NOTE — PROGRESS NOTES
[facility]    Gastroenterology Daily Progress Note      Patient:   Albina Vaz   :    1945   Facility:   [facility]  Date:     2020  Consultant:   Hira Yoo CNP      SUBJECTIVE  77 y.o. male admitted 2020 with Atrial fibrillation with RVR ([facility] [address] [I48.91]  Atrial fibrillation with RVR ([address] [I48.91] and seen for anemia and cirrhosis. The pt was seen and examined. More alert today, did receive morphine overnight for left leg pain. No c/o abdominal pain or nausea. Appetite remains decreased. No melena or hematochezia.  .        OBJECTIVE  Scheduled Meds:   polyethylene glycol  2,000 mL Oral Once    [START ON 2020] polyethylene glycol  2,000 mL Oral Once    midodrine  10 mg Oral TID    dilTIAZem  30 mg Oral 4 times per day    ferrous sulfate  325 mg Oral TID WC    escitalopram  20 mg Oral Daily    mirtazapine  15 mg Oral Nightly    magnesium oxide  400 mg Oral Daily    gabapentin  300 mg Oral TID    [START ON 10/5/2020] vitamin D  50,000 Units Oral Q30 Days    pantoprazole  40 mg Oral BID AC    sodium chloride flush  10 mL Intravenous 2 times per day    digoxin  125 mcg Oral Daily    [Held by provider] metoprolol tartrate  12.5 mg Oral BID    [Held by provider] apixaban  5 mg Oral BID       Vital Signs:  /69   Pulse 52   Temp 98.1 °F (36.7 °C) (Oral)   Resp 16   Ht 5' 10\" (1.778 m)   Wt 116 lb 4.8 oz (52.8 kg)   SpO2 97%   BMI 16.69 kg/m²      Physical Exam:     General Appearance: ill appearing alert and oriented to person,  well-developed and mal-nourished, in no acute distress  Skin: warm and dry, no rash   Head: normocephalic and atraumatic  Eyes: pupils equal, round, and reactive to light, extraocular eye movements intact, conjunctivae normal  ENT: hearing grossly normal bilaterally  Neck: neck supple and non tender without mass, no thyromegaly or thyroid nodules, no cervical lymphadenopathy   Pulmonary/Chest: clear to auscultation bilaterally- no wheezes, rales or rhonchi, normal air movement, no respiratory distress  Cardiovascular: bradycardic rate, regular rhythm, normal S1 and S2, no murmurs, rubs, clicks or gallops, distal pulses intact, no carotid bruits  Abdomen: soft, non-tender, non-distended, normal bowel sounds, no masses or organomegaly  Extremities: no cyanosis, clubbing or edema  Musculoskeletal: normal range of motion, no joint swelling, deformity left bka dressing  Neurologic: no cranial nerve deficit and muscle strength normal        Lab and Imaging Review     CBC  Recent Labs     09/22/20  0532 09/23/20  0447 09/24/20  0502   WBC 10.8 10.5 9.9   HGB 8.1* 8.1* 8.5*   HCT 28.4* 27.9* 29.6*   MCV 79.6* 79.5* 79.6*    294 317       BMP  Recent Labs     09/22/20  0532 09/23/20  0447 09/24/20  0502   * 133* 134*   K 3.7 3.9 4.3    107 107   CO2 19* 20 21   BUN 11 12 15   CREATININE 0.59* 0.55* 0.53*   GLUCOSE 116* 79 100*   CALCIUM 6.6* 6.6* 7.0*     Results for Madyson Pryor (MRN [id number]) as of 9/23/2020 15:24    Ref. Range 9/20/2020 18:17   CMV IgG Latest Ref Range: <0.9  26.8 (H)   CMV IgM Latest Ref Range: <0.9  0.2    Results for Madyson Pryor (MRN [id number]) as of 9/24/2020 15:08   Ref. Range 9/20/2020 18:17   Anti-Mitochondrial Antibody Latest Ref Range: 0.0 - 24.9 Units 2.8   Liver-Kidney Microsomal Ab Latest Ref Range: <1:20  <1:20   Results for Madyson Pryor (MRN [id number]) as of 9/24/2020 15:08   Ref. Range 9/20/2020 18:17   CMV IgG Latest Ref Range: <0.9  26.8 (H)   CMV IgM Latest Ref Range: <0.9  0.2     Results for Madyson Pryor (MRN [id number]) as of 9/22/2020 17:14    Ref. Range 9/20/2020 18:17   BRET Latest Ref Range: NEGATIVE  NEGATIVE   Smooth Muscle Ab Latest Ref Range: NONE DETECTED  1:20 (A)   Results for Madyson Pryor (MRN [id number]) as of 9/21/2020 09:41    Ref.  Range 9/20/2020 18:17   A-1 Antitrypsin Latest Ref Range: 90 - 200 mg/dL 167   Results for LISINSKI, ALEJANDRO REY (MRN [id number]) as of 9/21/2020 09:41    Ref. Range 9/20/2020 18:17   Ceruloplasmin Latest Ref Range: 15 - 30 mg/dL 14 (L)   Results for Sabrina Sanches (MRN [id number]) as of 9/21/2020 09:41    Ref. Range 9/20/2020 18:17   IgA Latest Ref Range: 70 - 400 mg/dL 350   Hep A IgM Latest Ref Range: NONREACTIVE  NONREACTIVE   Hepatitis B Surface Ag Latest Ref Range: NONREACTIVE  NONREACTIVE   Hepatitis C Ab Latest Ref Range: NONREACTIVE  NONREACTIVE   Hep B Core Ab, IgM Latest Ref Range: NONREACTIVE  NONREACTIVE   Results for Sabrina Sanches (MRN [id number]) as of 9/21/2020 09:41    Ref. Range 9/20/2020 18:17   AFP (Alpha Fetoprotein) Latest Ref Range: <8.4 ug/L 1.6       Results for Sabrina Sanches (MRN [id number]) as of 9/22/2020 17:14 paracentesis    Ref. Range 9/21/2020 12:00   Appearance, Fluid Unknown NOT REPORTED   Basos, Fluid Latest Ref Range: 0 % NOT REPORTED   Color, Fluid Unknown NOT REPORTED   Eos, Fluid Latest Ref Range: 0 % NOT REPORTED   Glucose, Fluid Latest Units: mg/dL 89   LD, Fluid Latest Units: U/L 49   RBC, Fluid Latest Units: /mm3 <3,000   Lymphocytes, Body Fluid Latest Units: % 20   Monocyte Count, Fluid Latest Units: % NOT REPORTED   Neutrophil Count, Fluid Latest Units: % 67   Total Protein, Body Fluid Latest Units: g/dL <1.0   WBC, Fluid Latest Units: /mm3 43   Amylase, Fluid Latest Units: U/L 7   Albumin, Fluid Latest Units: g/dL 0.2   Other Cells, Fluid Latest Units: % MONOCYTES      Ct Abdomen Pelvis W Iv Contrast     Result Date: 9/19/2020  EXAMINATION: CT OF THE ABDOMEN AND PELVIS WITH CONTRAST 9/19/2020 12:27 pm  Initial FINDINGS: Lower Chest: Mild to moderate right pleural effusion.  Small left pleural effusion.  Emphysematous changes in the lungs.  Compressive atelectasis both lung bases.  Coronary artery calcification.  Organs: Liver contains several subcentimeter hypodensities near the dome right lobe consistent with cysts.  Liver is slightly lobular in contour.  The spleen, pancreas, and adrenals are unremarkable.  Gallstones are noted within the gallbladder.  Kidneys demonstrate no evidence of hydronephrosis or nephrolithiasis. GI/Bowel: Small amount of upper abdominal ascites and haziness in the mesentery is noted.  No free air or bowel obstruction is noted.  Gastric folds are prominent and thickened consistent with gastritis. Pelvis: No evidence of appendicitis.  Small amount of pelvic fluid.  Bladder is decompressed limiting assessment but bladder wall appears prominent. Prostate is enlarged.  Bladder wall thickening may be related to hypertrophy. No inguinal adenopathy is noted.  Atherosclerotic calcification of the iliac vessels is noted, moderately significant. Peritoneum/Retroperitoneum: No aortic aneurysm.  Diffuse atherosclerotic calcification of the aorta is noted, mild to moderate.  An Bones/Soft Tissues: No acute osseous or soft tissue abnormality.      1.  Moderate right and small left pleural effusions with compressive atelectasis at the lung bases in this patient with emphysematous changes. 2.  Prominent gastric fold thickening most compatible with gastritis. 3.  Liver appears slightly lobular.  Additionally, abdominal ascites is noted.  The constellation of findings are consistent with cirrhosis. 4.  Enlarged prostate.  Bladder wall appears thickened although bladder is suboptimally distended. 5.  Atherosclerotic disease. 6.  No bowel obstruction or urinary obstruction although gallstones are present.  No evidence of appendicitis.          ASSESSMENT/PLAN:  1.anemia no overt bleeding  -pt agreeable to egd and colonoscopy will plan for tomorrow; spli  -hold AC  -continue ppi  -keep hgb>7    2.cirrhosis with ascites, mentation improved today  -avoid narcotics, may have up to 2gm tylenol per day  -2gm low salt diet    This plan was formulated in collaboration with Dr. Ofe Pacheco .     Electronically signed by: GEOFF Carter CNP on 9/24/2020 at 3:10 PM

## 2020-09-24 NOTE — PROGRESS NOTES
Patient is crying out in extreme pain and RN was advised to hold the pain meds because GI felt he was lethargic from the Roxicodone 5 mg. RN tried to reposition the patient, use therapeutic touch, which he did not tolerate and did not help alleviate his discomfort.

## 2020-09-24 NOTE — PROGRESS NOTES
Patient is alert and resting comfortably in bed. He was given 2 mg morphine before dressing changes, which he tolerated well. He is no longer calling out in agony and resting comfortably. RN will continue to monitor and reassess.

## 2020-09-24 NOTE — CARE COORDINATION
SHANNON attempted to contact pt daughter Arlene Ramires to discuss discharge planning, no answer, SHANNON left message requesting a return call. SHANNON called pt son Hussain Hall to discuss discharge planning, Hussain Hall reports the pt just moved to [facility] within the last month. SHANNON explained the facility will not accept him back. SHANNON discussed the pt has been denied by [facility] and [facility]. SHANNON requested Hussain Hall assistance with choosing another facility. Hussain Hall reports the pt will need a long-term facility. SHANNON explained [facility] and [facility] might have long-term beds and accept pt insurance. Hussain Dinajaye is agreeable to referral to [facility] due to location.  SHANNON will fax referral

## 2020-09-24 NOTE — FLOWSHEET NOTE
09/24/20 0148   Provider Notification   Reason for Communication Evaluate   Provider Name [facility]   Provider Notification Advance Practice Clinician (CNS, NP, CNM, CRNA, PA)   Method of Communication Secure Message   Notification Time 0148   Patient has been crying out in pain all night and pain meds were held by RN as instructed in GI notes, due to his being lethargic when GI rounded. Patient has dressing changes due and patient will not tolerate these interventions without some pain control. Patient is alert and continues to cry out in pain.

## 2020-09-24 NOTE — PROGRESS NOTES
person, place, and time. Laboratory data:   I have independently reviewed the followinglabs:  CBC with Differential:   Recent Labs     09/23/20  0447 09/24/20  0502   WBC 10.5 9.9   HGB 8.1* 8.5*   HCT 27.9* 29.6*    317   LYMPHOPCT 13* 17*   MONOPCT 6 8     BMP:   Recent Labs     09/23/20  0447 09/24/20  0502   * 134*   K 3.9 4.3    107   CO2 20 21   BUN 12 15   CREATININE 0.55* 0.53*     Hepatic Function Panel:   No results for input(s): PROT, LABALBU, BILIDIR, IBILI, BILITOT, ALKPHOS, ALT, AST in the last 72 hours. Lab Results   Component Value Date    PROCAL 48.67 09/21/2020    PROCAL 10.59 09/19/2020     Lab Results   Component Value Date    CRP 99.0 09/19/2020     Lab Results   Component Value Date    SEDRATE 12 09/19/2020         No results found for: DDIMER  Lab Results   Component Value Date    FERRITIN 79 09/20/2020     No results found for: LDH  No results found for: FIBRINOGEN    Results in Past 30 Days  Result Component Current Result Ref Range Previous Result Ref Range   SARS-CoV-2      (9/21/2020)  Not in Time Range          (9/21/2020)       Not Detected (9/21/2020) Not Detected       Lab Results   Component Value Date    COVID19 Not Detected 09/21/2020       No results for input(s): VANCOTROUGH in the last 72 hours. Color, Fluid  NOT REPORTED   Final  09/21/2020 12:00 PM  [address] [facility] [place] [place] [facility]    Appearance, Fluid  NOT REPORTED   Final  09/21/2020 12:00 PM  [address] [facility] [place] [place] [facility]    WBC, Fluid  43  /mm3  Final  09/21/2020 12:00 PM  [address]       RBC, Fluid  <3,000  /mm3  Final  09/21/2020 12:00 PM  [address]       Specimen Type  . PARACENTESIS FLUID   Final  09/21/2020 12:00 PM  [address] [facility] [place] [place] [facility]    Neutrophil Count, Fluid  67  %  Final  09/21/2020 12:00 PM  [address]       Lymphocytes, Body Fluid  20  %  Final  09/21/2020 12:00 PM  [address]       Monocyte Count, Fluid  NOT Specimen Description  . BLOOD     Special Requests  NOT REPORTED     Culture  NO GROWTH 5 DAYS    Culture, Blood 1 [[id number]]  (Abnormal)  Collected: 09/19/20 1155    Order Status: Completed  Specimen: Blood  Updated: 09/22/20 0518     Specimen Description  . BLOOD     Special Requests  NOT REPORTED     Culture  POSITIVE Blood Culture Results called to and read back by: [id number] 09/20/2020 MICHAEL Proctor        DIRECT GRAM STAIN FROM BOTTLE: GRAM POSITIVE COCCI IN CLUSTERS      Coagulase negative Staphylococcus species detected by PCRAbnormal        STAPHYLOCOCCUS SPECIES, COAGULASE NEGATIVE A single positive blood culture of coagulase negative Staphylocci, diptheroids, micrococci, Cutibacterium, viridans Streptocci, Bacillus, or Lactobacillus species should be interpreted with caution and viewed as a likely skin contaminant. Abnormal         Medications:      midodrine  10 mg Oral TID    dilTIAZem  30 mg Oral 4 times per day    ferrous sulfate  325 mg Oral TID WC    escitalopram  20 mg Oral Daily    mirtazapine  15 mg Oral Nightly    magnesium oxide  400 mg Oral Daily    gabapentin  300 mg Oral TID    [START ON 10/5/2020] vitamin D  50,000 Units Oral Q30 Days    pantoprazole  40 mg Oral BID AC    sodium chloride flush  10 mL Intravenous 2 times per day    digoxin  125 mcg Oral Daily    [Held by provider] metoprolol tartrate  12.5 mg Oral BID    apixaban  5 mg Oral BID    cefTRIAXone (ROCEPHIN) IV  1 g Intravenous Q24H           [facility]  Samson Cruz MD  Perfect Serve messaging  OFFICE: [phone]      Electronically signed by Samson Cruz MD on 9/24/2020 at 10:08 AM  Thank you for allowing us to participate in the care of this patient. Please call with questions.     This note iscreated with the assistance of a speech recognition program.  While intending to generate a document that actually reflects the content of the visit, the document can still have some errors including those of syntax andsound a like substitutions which may escape proof reading. In such instances, actual meaning can be extrapolated by contextual diversion.

## 2020-09-25 ENCOUNTER — APPOINTMENT (OUTPATIENT)
Dept: GENERAL RADIOLOGY | Age: 75
DRG: 871 | End: 2020-09-25
Payer: COMMERCIAL

## 2020-09-25 LAB
ABSOLUTE EOS #: <0.03 K/UL (ref 0–0.44)
ABSOLUTE IMMATURE GRANULOCYTE: 0.16 K/UL (ref 0–0.3)
ABSOLUTE LYMPH #: 1.67 K/UL (ref 1.1–3.7)
ABSOLUTE MONO #: 0.97 K/UL (ref 0.1–1.2)
ANION GAP SERPL CALCULATED.3IONS-SCNC: 8 MMOL/L (ref 9–17)
BASOPHILS # BLD: 0 % (ref 0–2)
BASOPHILS ABSOLUTE: 0.03 K/UL (ref 0–0.2)
BUN BLDV-MCNC: 16 MG/DL (ref 8–23)
BUN/CREAT BLD: 31 (ref 9–20)
CALCIUM SERPL-MCNC: 7 MG/DL (ref 8.6–10.4)
CHLORIDE BLD-SCNC: 108 MMOL/L (ref 98–107)
CO2: 20 MMOL/L (ref 20–31)
CREAT SERPL-MCNC: 0.51 MG/DL (ref 0.7–1.2)
CULTURE: NORMAL
DATE, STOOL #1: ABNORMAL
DATE, STOOL #2: ABNORMAL
DATE, STOOL #3: ABNORMAL
DIFFERENTIAL TYPE: ABNORMAL
EOSINOPHILS RELATIVE PERCENT: 0 % (ref 1–4)
GFR AFRICAN AMERICAN: >60 ML/MIN
GFR NON-AFRICAN AMERICAN: >60 ML/MIN
GFR SERPL CREATININE-BSD FRML MDRD: ABNORMAL ML/MIN/{1.73_M2}
GFR SERPL CREATININE-BSD FRML MDRD: ABNORMAL ML/MIN/{1.73_M2}
GLUCOSE BLD-MCNC: 87 MG/DL (ref 70–99)
HCT VFR BLD CALC: 28.3 % (ref 40.7–50.3)
HEMOCCULT SP1 STL QL: POSITIVE
HEMOCCULT SP2 STL QL: ABNORMAL
HEMOCCULT SP3 STL QL: ABNORMAL
HEMOGLOBIN: 8.2 G/DL (ref 13–17)
IMMATURE GRANULOCYTES: 2 %
LYMPHOCYTES # BLD: 18 % (ref 24–43)
Lab: NORMAL
MCH RBC QN AUTO: 22.7 PG (ref 25.2–33.5)
MCHC RBC AUTO-ENTMCNC: 29 G/DL (ref 28.4–34.8)
MCV RBC AUTO: 78.4 FL (ref 82.6–102.9)
MONOCYTES # BLD: 10 % (ref 3–12)
NRBC AUTOMATED: 0 PER 100 WBC
PDW BLD-RTO: 18.9 % (ref 11.8–14.4)
PLATELET # BLD: 323 K/UL (ref 138–453)
PLATELET ESTIMATE: ABNORMAL
PMV BLD AUTO: 10 FL (ref 8.1–13.5)
POTASSIUM SERPL-SCNC: 4.1 MMOL/L (ref 3.7–5.3)
RBC # BLD: 3.61 M/UL (ref 4.21–5.77)
RBC # BLD: ABNORMAL 10*6/UL
SEG NEUTROPHILS: 70 % (ref 36–65)
SEGMENTED NEUTROPHILS ABSOLUTE COUNT: 6.6 K/UL (ref 1.5–8.1)
SODIUM BLD-SCNC: 136 MMOL/L (ref 135–144)
SPECIMEN DESCRIPTION: NORMAL
TIME, STOOL #1: 452
TIME, STOOL #2: ABNORMAL
TIME, STOOL #3: ABNORMAL
WBC # BLD: 9.4 K/UL (ref 3.5–11.3)
WBC # BLD: ABNORMAL 10*3/UL

## 2020-09-25 PROCEDURE — 97530 THERAPEUTIC ACTIVITIES: CPT

## 2020-09-25 PROCEDURE — 2580000003 HC RX 258: Performed by: FAMILY MEDICINE

## 2020-09-25 PROCEDURE — 6370000000 HC RX 637 (ALT 250 FOR IP): Performed by: FAMILY MEDICINE

## 2020-09-25 PROCEDURE — APPSS45 APP SPLIT SHARED TIME 31-45 MINUTES: Performed by: NURSE PRACTITIONER

## 2020-09-25 PROCEDURE — 97167 OT EVAL HIGH COMPLEX 60 MIN: CPT

## 2020-09-25 PROCEDURE — 71046 X-RAY EXAM CHEST 2 VIEWS: CPT

## 2020-09-25 PROCEDURE — 82272 OCCULT BLD FECES 1-3 TESTS: CPT

## 2020-09-25 PROCEDURE — 6370000000 HC RX 637 (ALT 250 FOR IP): Performed by: NURSE PRACTITIONER

## 2020-09-25 PROCEDURE — 2500000003 HC RX 250 WO HCPCS: Performed by: NURSE PRACTITIONER

## 2020-09-25 PROCEDURE — 99231 SBSQ HOSP IP/OBS SF/LOW 25: CPT | Performed by: INTERNAL MEDICINE

## 2020-09-25 PROCEDURE — 36415 COLL VENOUS BLD VENIPUNCTURE: CPT

## 2020-09-25 PROCEDURE — 80048 BASIC METABOLIC PNL TOTAL CA: CPT

## 2020-09-25 PROCEDURE — 99232 SBSQ HOSP IP/OBS MODERATE 35: CPT | Performed by: INTERNAL MEDICINE

## 2020-09-25 PROCEDURE — 97535 SELF CARE MNGMENT TRAINING: CPT

## 2020-09-25 PROCEDURE — 85025 COMPLETE CBC W/AUTO DIFF WBC: CPT

## 2020-09-25 PROCEDURE — 6360000002 HC RX W HCPCS: Performed by: NURSE PRACTITIONER

## 2020-09-25 PROCEDURE — 2060000000 HC ICU INTERMEDIATE R&B

## 2020-09-25 PROCEDURE — 6370000000 HC RX 637 (ALT 250 FOR IP): Performed by: INTERNAL MEDICINE

## 2020-09-25 RX ORDER — FUROSEMIDE 10 MG/ML
40 INJECTION INTRAMUSCULAR; INTRAVENOUS ONCE
Status: COMPLETED | OUTPATIENT
Start: 2020-09-25 | End: 2020-09-25

## 2020-09-25 RX ORDER — METOPROLOL TARTRATE 5 MG/5ML
5 INJECTION INTRAVENOUS ONCE
Status: COMPLETED | OUTPATIENT
Start: 2020-09-25 | End: 2020-09-25

## 2020-09-25 RX ORDER — FUROSEMIDE 20 MG/1
20 TABLET ORAL DAILY
Status: DISCONTINUED | OUTPATIENT
Start: 2020-09-26 | End: 2020-09-29 | Stop reason: HOSPADM

## 2020-09-25 RX ADMIN — METOPROLOL TARTRATE 5 MG: 5 INJECTION, SOLUTION INTRAVENOUS at 14:50

## 2020-09-25 RX ADMIN — DILTIAZEM HYDROCHLORIDE 30 MG: 30 TABLET, FILM COATED ORAL at 16:09

## 2020-09-25 RX ADMIN — ESCITALOPRAM OXALATE 20 MG: 10 TABLET ORAL at 08:44

## 2020-09-25 RX ADMIN — DILTIAZEM HYDROCHLORIDE 30 MG: 30 TABLET, FILM COATED ORAL at 04:23

## 2020-09-25 RX ADMIN — MAGNESIUM CITRATE 296 ML: 1.75 LIQUID ORAL at 19:45

## 2020-09-25 RX ADMIN — DILTIAZEM HYDROCHLORIDE 30 MG: 30 TABLET, FILM COATED ORAL at 21:26

## 2020-09-25 RX ADMIN — FERROUS SULFATE TAB EC 325 MG (65 MG FE EQUIVALENT) 325 MG: 325 (65 FE) TABLET DELAYED RESPONSE at 16:09

## 2020-09-25 RX ADMIN — PANTOPRAZOLE SODIUM 40 MG: 40 TABLET, DELAYED RELEASE ORAL at 16:09

## 2020-09-25 RX ADMIN — GABAPENTIN 300 MG: 300 CAPSULE ORAL at 21:25

## 2020-09-25 RX ADMIN — GABAPENTIN 300 MG: 300 CAPSULE ORAL at 16:09

## 2020-09-25 RX ADMIN — MAGNESIUM GLUCONATE 500 MG ORAL TABLET 400 MG: 500 TABLET ORAL at 08:44

## 2020-09-25 RX ADMIN — FUROSEMIDE 40 MG: 10 INJECTION, SOLUTION INTRAMUSCULAR; INTRAVENOUS at 14:08

## 2020-09-25 RX ADMIN — DIGOXIN 125 MCG: 125 TABLET ORAL at 08:44

## 2020-09-25 RX ADMIN — METOPROLOL TARTRATE 12.5 MG: 25 TABLET, FILM COATED ORAL at 21:27

## 2020-09-25 RX ADMIN — MIDODRINE HYDROCHLORIDE 10 MG: 10 TABLET ORAL at 21:26

## 2020-09-25 RX ADMIN — PANTOPRAZOLE SODIUM 40 MG: 40 TABLET, DELAYED RELEASE ORAL at 08:44

## 2020-09-25 RX ADMIN — FERROUS SULFATE TAB EC 325 MG (65 MG FE EQUIVALENT) 325 MG: 325 (65 FE) TABLET DELAYED RESPONSE at 08:45

## 2020-09-25 RX ADMIN — DILTIAZEM HYDROCHLORIDE 30 MG: 30 TABLET, FILM COATED ORAL at 09:57

## 2020-09-25 RX ADMIN — MIDODRINE HYDROCHLORIDE 10 MG: 10 TABLET ORAL at 16:09

## 2020-09-25 RX ADMIN — MIDODRINE HYDROCHLORIDE 10 MG: 10 TABLET ORAL at 08:44

## 2020-09-25 RX ADMIN — Medication 10 ML: at 21:30

## 2020-09-25 RX ADMIN — Medication 10 ML: at 08:45

## 2020-09-25 RX ADMIN — GABAPENTIN 300 MG: 300 CAPSULE ORAL at 08:44

## 2020-09-25 RX ADMIN — MIRTAZAPINE 15 MG: 15 TABLET, FILM COATED ORAL at 21:25

## 2020-09-25 ASSESSMENT — ENCOUNTER SYMPTOMS
BLOOD IN STOOL: 0
COLOR CHANGE: 0
RESPIRATORY NEGATIVE: 1
WHEEZING: 0
GASTROINTESTINAL NEGATIVE: 1
SHORTNESS OF BREATH: 0
CONSTIPATION: 0
SINUS PAIN: 0
NAUSEA: 0
SINUS PRESSURE: 0
DIARRHEA: 1
ABDOMINAL PAIN: 0
CHEST TIGHTNESS: 0
VOMITING: 0
COUGH: 0

## 2020-09-25 ASSESSMENT — PAIN DESCRIPTION - PROGRESSION
CLINICAL_PROGRESSION: NOT CHANGED

## 2020-09-25 ASSESSMENT — PAIN SCALES - GENERAL
PAINLEVEL_OUTOF10: 0

## 2020-09-25 NOTE — PROGRESS NOTES
[facility]  Office: [address], DO, Hinachance Adin, DO, Yuliya Ivan, DO, Patashley Wilson, DO, Roxanne Zacarias MD, Cristiane Pollock MD, Duran Ba MD, Brett Oneal MD, Enzo Aponte MD, El Roberts MD, Vereniec Mullins MD, Melanie Pollard MD, Cal Salinas MD, Micaela Alfaro DO, Dashawn Crocker MD, Brooklynn Fields MD, All Chamberlain DO, Kaylene Fajardo MD,  Elen Olmstead DO, Chandan Munoz MD, Troy Lorenzo MD, Seth Silva, [facility], [facility], CNP, Rich Campbell, CNP, Raissa Lorenoz, CNS, Stanford Hernadez, CNP, Jazmin Zhou, CNP, Leland Mann, CNP, Yasmin Mabry, [facility], Reina Jarrell, CNP, Wanda Avitia PA-C, Patt Amanda, [facility], Raquel Adams, CNP, Piedad Kenny, CNP, Cirilo Pitts, CNP, Namrata Guan, [facility], [facility], [facility]    Progress Note    9/25/2020    8:24 AM    Name:   Sherine Holman  MRN:     [id number]     Acct:      [de-identified]   Room:   [facility] Day:  6  Admit Date:  9/19/2020 11:24 AM    PCP:   Laureen Buerger, APRN - CNP  Code Status:  Full Code    Subjective:     C/C:   Chief Complaint   Patient presents with    Dizziness    Palpitations     Interval History Status: not changed. Condition is again essentially stable. Patient is apparently agreeable to skilled nursing facility for rehabilitation. We await GI intervention versus decision to follow as an outpatient. Patient states he will work with PT/OT with the intention of improving his quality of life. Patient refuses hospice and palliative care. Occult stool is positive, however, hemoglobin is stable for the past 5 days. ID has the escalated therapy. Fluids stopped as patient is experiencing peripheral edema. Brief History:     9/19 -very poor historian. Known history of A. fib. Known history of alcoholism. Known history of cancer, likely malignant melanoma.   Admitted from the nursing home with near syncope and palpitations. Patient is found to have A. fib with RVR and hypotension patient was treated according to ACLS protocols and cardiology was consulted    9/20 -patient is found to have decreased liver function, ascites, anemia requiring transfusion, elevated white count, and a single positive blood culture. Infectious disease, cardiology, gastroenterology are requested. 9/21 -patient condition has not changed. Patient heart rate and blood pressure is better controlled today. Hemoglobin continues to trend down. Infectious disease continues to follow and is narrowing antimicrobial therapy. We await paracentesis results. 9/22 -transition to oral medications. Await GI recommendations on anemia work-up. Paracentesis results still pending. 9/23 -GI apparently plans scopes for tomorrow. Paracentesis is negative for malignancy, culture results pending. 9/24 -patient condition essentially stable. Cardiology and infectious disease have de-escalate therapy. Anticipate discharge to skilled nursing facility for continued rehabilitation. 9/25 -we await GI intervention versus decision to follow as an outpatient. Patient is agreeable to SNF. Precertification and placement decisions are pending. Review of Systems:     Review of Systems   Constitutional: Positive for activity change, appetite change and fatigue. Negative for chills, diaphoresis and fever. HENT: Negative for congestion, sinus pressure and sinus pain. Eyes: Negative for visual disturbance. Respiratory: Negative for cough, chest tightness, shortness of breath and wheezing. Cardiovascular: Positive for leg swelling. Negative for chest pain and palpitations. Gastrointestinal: Positive for diarrhea. Negative for abdominal pain, blood in stool, constipation, nausea and vomiting. Endocrine: Negative for cold intolerance and heat intolerance. Genitourinary: Negative for difficulty urinating, flank pain and frequency. Musculoskeletal: Positive for arthralgias. Skin: Positive for pallor. Negative for color change. Neurological: Positive for weakness and light-headedness. Negative for dizziness, numbness and headaches. Psychiatric/Behavioral: Negative for agitation and confusion. All other systems reviewed and are negative. Medications: Allergies:  No Known Allergies    Current Meds:   Scheduled Meds:    polyethylene glycol  2,000 mL Oral Once    midodrine  10 mg Oral TID    dilTIAZem  30 mg Oral 4 times per day    ferrous sulfate  325 mg Oral TID WC    escitalopram  20 mg Oral Daily    mirtazapine  15 mg Oral Nightly    magnesium oxide  400 mg Oral Daily    gabapentin  300 mg Oral TID    [START ON 10/5/2020] vitamin D  50,000 Units Oral Q30 Days    pantoprazole  40 mg Oral BID AC    sodium chloride flush  10 mL Intravenous 2 times per day    digoxin  125 mcg Oral Daily    [Held by provider] metoprolol tartrate  12.5 mg Oral BID    [Held by provider] apixaban  5 mg Oral BID     Continuous Infusions:     PRN Meds: oxyCODONE **OR** [DISCONTINUED] oxyCODONE, potassium chloride **OR** potassium alternative oral replacement **OR** potassium chloride, sodium chloride flush, sodium chloride flush    Data:     Past Medical History:   has a past medical history of History of alcoholism ([address] and Hypertension. Social History:   reports that he has been smoking. He has been smoking about 0.50 packs per day. He has never used smokeless tobacco. He reports current alcohol use. He reports that he does not use drugs. Family History: History reviewed. No pertinent family history. Vitals:  BP (!) 118/53   Pulse 109   Temp 98.1 °F (36.7 °C) (Oral)   Resp 22   Ht 5' 10\" (1.778 m)   Wt 130 lb 7 oz (59.2 kg)   SpO2 95%   BMI 18.72 kg/m²   Temp (24hrs), Av °F (36.7 °C), Min:97.9 °F (36.6 °C), Max:98.1 °F (36.7 °C)    No results for input(s): POCGLU in the last 72 hours. I/O (24Hr):   No intake or output data in the 24 hours ending 09/25/20 0824    Labs:  Hematology:  Recent Labs     09/23/20 0447 09/24/20  0502 09/25/20  0536   WBC 10.5 9.9 9.4   RBC 3.51* 3.72* 3.61*   HGB 8.1* 8.5* 8.2*   HCT 27.9* 29.6* 28.3*   MCV 79.5* 79.6* 78.4*   MCH 23.1* 22.8* 22.7*   MCHC 29.0 28.7 29.0   RDW 18.6* 18.7* 18.9*    317 323   MPV 9.6 9.7 10.0     Chemistry:  Recent Labs     09/23/20 0447 09/23/20  1152 09/24/20  0502 09/25/20  0536   *  --  134* 136   K 3.9  --  4.3 4.1     --  107 108*   CO2 20  --  21 20   GLUCOSE 79  --  100* 87   BUN 12  --  15 16   CREATININE 0.55*  --  0.53* 0.51*   ANIONGAP 6*  --  6* 8*   LABGLOM >60  --  >60 >60   GFRAA >60  --  >60 >60   CALCIUM 6.6*  --  7.0* 7.0*   CAION  --  1.14  --   --      No results for input(s): PROT, LABALBU, LABA1C, C4LJZNX, Q3AUKPE, FT4, TSH, AST, ALT, LDH, GGT, ALKPHOS, LABGGT, BILITOT, BILIDIR, AMMONIA, AMYLASE, LIPASE, LACTATE, CHOL, HDL, LDLCHOLESTEROL, CHOLHDLRATIO, TRIG, VLDL, JHB10QF, PHENYTOIN, PHENYF, URICACID, POCGLU in the last 72 hours. ABG:No results found for: POCPH, PHART, PH, POCPCO2, ZTQ6SUM, PCO2, POCPO2, PO2ART, PO2, POCHCO3, WRO5XGX, HCO3, NBEA, PBEA, BEART, BE, THGBART, THB, DPT1LOH, JRFH8TKO, I0EQNVIY, O2SAT, FIO2  Lab Results   Component Value Date/Time    SPECIAL NOT REPORTED 09/21/2020 12:00 PM     Lab Results   Component Value Date/Time    CULTURE NO GROWTH 3 DAYS 09/21/2020 12:00 PM       Radiology:  Ct Abdomen Pelvis W Iv Contrast    Result Date: 9/19/2020  1. Moderate right and small left pleural effusions with compressive atelectasis at the lung bases in this patient with emphysematous changes. 2.  Prominent gastric fold thickening most compatible with gastritis. 3.  Liver appears slightly lobular. Additionally, abdominal ascites is noted. The constellation of findings are consistent with cirrhosis. 4.  Enlarged prostate. Bladder wall appears thickened although bladder is suboptimally distended.  5. Atherosclerotic disease. 6.  No bowel obstruction or urinary obstruction although gallstones are present. No evidence of appendicitis. Xr Chest Portable    Result Date: 9/19/2020  1. Right lower lobe airspace opacity potentially due to atelectasis, pneumonia, or aspiration. 2. Bony demineralization partially limits evaluation for fractures. Ct Chest Pulmonary Embolism W Contrast    Result Date: 9/19/2020  1. No evidence of pulmonary embolism. 2.  Emphysematous changes. 3. small to moderate right pleural effusion with trace left effusion and basilar atelectasis. 4.  Mild ascites upper abdomen. Physical Examination:        Physical Exam  Vitals signs and nursing note reviewed. Constitutional:       General: He is not in acute distress. Appearance: He is well-developed. He is not diaphoretic. Comments: Underweight and malnourished, poor personal hygiene   HENT:      Head: Normocephalic and atraumatic. Right Ear: Hearing normal.      Left Ear: Hearing normal.      Nose: Nose normal. No rhinorrhea. Eyes:      General: Lids are normal.      Extraocular Movements:      Right eye: Normal extraocular motion. Left eye: Normal extraocular motion. Conjunctiva/sclera: Conjunctivae normal.      Right eye: Right conjunctiva is not injected. Left eye: Left conjunctiva is not injected. Pupils: Pupils are equal, round, and reactive to light. Pupils are equal.      Right eye: Pupil is reactive. Left eye: Pupil is reactive. Neck:      Musculoskeletal: Neck supple. Thyroid: No thyromegaly. Vascular: No carotid bruit. Trachea: Trachea and phonation normal. No tracheal deviation. Cardiovascular:      Rate and Rhythm: Normal rate. Rhythm irregular. Pulses: Normal pulses. Heart sounds: Normal heart sounds. No murmur. Pulmonary:      Effort: Pulmonary effort is normal. No respiratory distress. Breath sounds: No stridor.  Examination of the right-lower field reveals decreased breath sounds. Decreased breath sounds present. Abdominal:      General: Bowel sounds are normal. There is no distension. Palpations: Abdomen is soft. There is no mass. Tenderness: There is no abdominal tenderness. There is no guarding. Musculoskeletal:         General: No tenderness. Right lower leg: Edema present. Comments: Left above-knee amputation   Skin:     General: Skin is warm and dry. Findings: No erythema, lesion or rash. Assessment:        Hospital Problems           Last Modified POA    * (Principal) Atrial fibrillation with RVR ([address] 9/19/2020 Yes    Elevated brain natriuretic peptide (BNP) level 9/19/2020 Yes    History of alcoholism ([address] 9/19/2020 Yes    Cirrhosis of liver with ascites ([address] 9/19/2020 Yes    Chronic atrial fibrillation 9/19/2020 Yes    Leukocytosis 9/19/2020 Yes    Anemia 9/21/2020 Yes    Thrombocytosis ([address] 9/19/2020 Yes    Severe malnutrition ([address] 9/19/2020 Yes    Sarcopenia 9/19/2020 Yes    Hyponatremia 9/19/2020 Yes    Sepsis ([address] 9/20/2020 Yes    Malignant melanoma of lower extremity, including hip ([address] 9/20/2020 Yes    Overview Signed 9/20/2020  3:34 PM by GEOFF Gonzales CNP     Hx of left foot with left groin lymph node positive for melanoma         Dizziness 9/20/2020 Yes    Acute on chronic diastolic heart failure ([address] 9/22/2020 Yes    Acute on chronic congestive heart failure ([address] 9/22/2020 Yes          Plan:          · De-escalate antibiotic therapy per ID  · Digoxin and Cardizem oral transition went well. · Continue to hold anticoagulation pending GI evaluation, anticipate definitive decision today. Occult stool is positive.   · Stop fluids  · PT/OT for outpatient recommendations on discharge, anticipate SNF, patient still agreeable, pre-CERT pending  · Nectar thick diet due to swallow study recommendations  · Blood pressure well controlled  · Continue to follow paracentesis cultures, thus far negative, negative for malignancy        [facility], APRN - NP  9/25/2020  8:24 AM

## 2020-09-25 NOTE — PROGRESS NOTES
Occupational Therapy   Occupational Therapy Initial Assessment  Date: 2020   Patient Name: Preciuos Lai  MRN: [id number]     : 1945    Date of Service: 2020    Discharge Recommendations:  [address]     RN reports patient is medically stable for therapy treatment this date. Chart reviewed prior to treatment and patient is agreeable for therapy. All lines intact and patient positioned comfortably at end of treatment. All patient needs addressed prior to ending therapy session. Assessment   Performance deficits / Impairments: Decreased functional mobility ; Decreased ADL status; Decreased strength;Decreased safe awareness;Decreased balance;Decreased sensation;Decreased endurance;Decreased posture  Prognosis: Fair  Decision Making: High Complexity  OT Education: OT Role;Plan of Care;Home Exercise Program;ADL Adaptive Strategies;Transfer Training;Energy Conservation; Family Education;Equipment;Precautions  REQUIRES OT FOLLOW UP: Yes  Activity Tolerance  Activity Tolerance: Patient limited by fatigue;Patient Tolerated treatment well  Safety Devices  Safety Devices in place: Yes  Type of devices: Call light within reach;Nurse notified; Patient at risk for falls; Left in bed;Bed alarm in place           Patient Diagnosis(es): The primary encounter diagnosis was Dizziness. Diagnoses of Atrial fibrillation with RVR ([facility] and Acute on chronic congestive heart failure, unspecified heart failure type [facility]) were also pertinent to this visit. has a past medical history of History of [facility]ism ([facility] and Hypertension. has a past surgical history that includes hernia repair and Hydrocele surgery.            Restrictions  Restrictions/Precautions  Restrictions/Precautions: Fall Risk, Up as Tolerated, Contact Precautions, General Precautions  Position Activity Restriction  Other position/activity restrictions: L AKA, wounds on RLE, telemetry, RUE IV    Subjective   General  Chart Reviewed: Yes  Patient assessed for rehabilitation services?: Yes  Family / Caregiver Present: No  Pain Assessment  Pain Assessment: Faces  Pain Level: 0  Response to Pain Intervention: Asleep with RR greater than 10  Social/Functional History  Social/Functional History  Type of Home: (Pt states he lives at [facility], assisted living)  ADL Assistance: Needs assistance  Homemaking Assistance: Needs assistance  Ambulation Assistance: (does not ambulate at baseline)  Transfer Assistance: Needs assistance(states is able to stand pivot transfer on RLE, no prosthetic on LLE.)  Additional Comments: Pt states he lives at [facility], assisted living. Pt did not give specifics as to what he requires help with. When asked what the staff helps pt with he replied, \"nothing\". Pt stated he is able to transfer from bed<>chair with a stand pivot on the RLE. Pt does not have a prosthetic leg for LLE. Limited info obtained, as pt would not answer questions directly. Objective   Vision: Within Functional Limits  Hearing: Within functional limits    Orientation  Overall Orientation Status: Within Functional Limits  Observation/Palpation  Posture: Good  Observation: Pt lying in bed with RLE wrapped and elevated on pillows. L AKA. Pt and RN agreeable to session. Edema: B UEs edema. Pt educated on positioning techs and importance of actively moving  Balance  Sitting Balance: Moderate assistance(x2 for sitting EOB x 5 min. Pt then abruptly needing to lie back down.)  Standing Balance: Unable to assess(comment)  Functional Mobility  Functional Mobility Comments: pt is non-ambulatory at baseline  ADL  Grooming:  Moderate assistance;Maximum assistance  UE Bathing: Maximum assistance  LE Bathing: Maximum assistance  UE Dressing: Maximum assistance  LE Dressing: Maximum assistance;Dependent/Total  Toileting: Maximum assistance;Dependent/Total  Additional Comments: pt has not been OOB much or at all. reluctant to participate in ADLs  Tone RUE  RUE Tone: Normotonic  Tone LUE  LUE Tone: Normotonic     Bed mobility  Rolling to Left: Maximum assistance;2 Person assistance  Rolling to Right: Maximum assistance;2 Person assistance  Supine to Sit: Maximum assistance;2 Person assistance  Sit to Supine: Minimal assistance;2 Person assistance  Scooting: Dependent/Total  Transfers  Sit to stand: Unable to assess  Stand to sit: Unable to assess     Cognition  Overall Cognitive Status: Exceptions  Arousal/Alertness: Appropriate responses to stimuli  Following Commands: Follows one step commands with repetition; Follows one step commands with increased time  Attention Span: Appears intact  Memory: Decreased recall of recent events;Decreased long term memory;Decreased recall of biographical Information  Safety Judgement: Decreased awareness of need for assistance;Decreased awareness of need for safety  Problem Solving: Assistance required to generate solutions;Assistance required to identify errors made;Assistance required to correct errors made;Assistance required to implement solutions  Insights: Decreased awareness of deficits  Initiation: Requires cues for some  Sequencing: Requires cues for some  Perception  Overall Perceptual Status: WFL     Sensation  Overall Sensation Status: Impaired        LUE AROM (degrees)  LUE AROM : WFL  RUE AROM (degrees)  RUE AROM : WFL  LUE Strength  LUE Strength Comment: SABAS VALENTINO's 3-3+/5                   Plan   Plan  Times per week: 4-5x/week  Current Treatment Recommendations: Strengthening, Balance Training, Functional Mobility Training, Endurance Training, Safety Education & Training, Self-Care / ADL, Positioning, Equipment Evaluation, Education, & procurement, Patient/Caregiver Education & Training         Goals  Short term goals  Time Frame for Short term goals: by discharge, pt will  Short term goal 1: demo mod A with bed mob with rails/controls  Short term goal 2: tolerate sitting EOB x 15 min for inc ADL participation and prep for transfers  Short term goal 3: Participate in B UE HEP for inc. functional strength for ADLs/mob  Short term goal 4: complete simple grooming with SBA following set up and min A with UB ADLs  Patient Goals   Patient goals : not stated       Therapy Time   Individual Concurrent Group Co-treatment   Time In 1003         Time Out 1030         Minutes 27              Patient would benefit from SNF for continued occupational therapy to increase independence with  ADL of bathing, dressing, toileting and grooming. Writer recommending SNF placement for for activity tolerance and strength which will increase independence with ADL's coordinated with bed mobility and chair transfers. Continued skilled OT services to address decreased safety awareness with ADL and IADL tasks and for education and increased independence with DME and AE for fall prevention and ec/ws techniques prior to d/c home. Co-treatment with PT warranted secondary to decreased safety and independence requiring 2 skilled therapy professionals to address individual discipline's goals. OT addressing preparation for ADL transfer, sitting balance for increased ADL performance, sitting/activity tolerance, functional reaching, environmental safety/scanning, fall prevention, ability to sequence and follow directions and functional UE strength.     Nani Block, OT

## 2020-09-25 NOTE — PLAN OF CARE
Problem: Skin Integrity:  Goal: Will show no infection signs and symptoms  Description: Will show no infection signs and symptoms  9/25/2020 1721 by Xander Alvarez RN  Outcome: Ongoing   No new altered skin. Will continue to monitor. Problem: Skin Integrity:  Goal: Absence of new skin breakdown  Description: Absence of new skin breakdown  Outcome: Ongoing   No new altered skin. Will continue to monitor. Problem: Cardiac:  Goal: Ability to maintain vital signs within normal range will improve  Description: Ability to maintain vital signs within normal range will improve  9/25/2020 1721 by Xander Alvarez RN  Outcome: Ongoing   Vitals stable. Monitoring. Problem: Cardiac:  Goal: Cardiovascular alteration will improve  Description: Cardiovascular alteration will improve  9/25/2020 1721 by Xander Alvarez RN  Outcome: Ongoing   Monitoring. Problem: Health Behavior:  Goal: Will modify at least one risk factor affecting health status  Description: Will modify at least one risk factor affecting health status  9/25/2020 1721 by Xander Alvarez RN  Outcome: Ongoing   Monitoring. Problem: Falls - Risk of:  Goal: Will remain free from falls  Description: Will remain free from falls  Outcome: Ongoing   NO falls this shift. Problem: Falls - Risk of:  Goal: Absence of physical injury  Description: Absence of physical injury  Outcome: Ongoing   NO injury this shift. Problem: Pain:  Goal: Pain level will decrease  Description: Pain level will decrease  Outcome: Ongoing   Intermittent pain with movement. Pt currently asleep in bed.

## 2020-09-25 NOTE — CARE COORDINATION
SHANNON received call from Kelly Weaver with [place] regarding the pt has been denied for SNF by his insurance. Kelly Weaver is checking to see if a peer to peer will be available.        SHANNON updated Debo JOHN CM regarding denial for skilled stay

## 2020-09-25 NOTE — PROGRESS NOTES
Interval Follow-Up Note     Subjective:  Main problem weight loss. He was unable to get endoscopy today due to tachycardia with heart rate in the 130s. He continues to have diffuse pains. OBJECTIVE:   /64   Pulse 94   Temp 97.5 °F (36.4 °C) (Oral)   Resp 18   Ht 5' 10\" (1.778 m)   Wt 130 lb 7 oz (59.2 kg)   SpO2 96%   BMI 18.72 kg/m²   Body mass index is 18.72 kg/m². GEN: Awake and oriented x3. In mild distress. Weak. HEENT: Conjunctivae clear. Eyelids normal. No lymphadenopathy. No thyroid mass. CV: s1s2, RRR, no rubs. PULM: No accessory muscle use. Normal breath sounds bilaterally. ABD/GI: Soft NT ND +BS  EXT: No edema or rash. Warm skin. No joint swelling. Limited neuro exam intact.      Lab Results   Component Value Date    WBC 9.4 09/25/2020    HGB 8.2 (L) 09/25/2020    HCT 28.3 (L) 09/25/2020    MCV 78.4 (L) 09/25/2020     09/25/2020     09/25/2020    K 4.1 09/25/2020     (H) 09/25/2020    CO2 20 09/25/2020    BUN 16 09/25/2020    CREATININE 0.51 (L) 09/25/2020    LABALBU 1.4 (L) 09/20/2020    BILITOT <0.10 (L) 09/20/2020    ALKPHOS 149 (H) 09/20/2020    AST 12 09/20/2020    ALT 10 09/20/2020      Lab Results   Component Value Date    RBC 3.61 (L) 09/25/2020    HGB 8.2 (L) 09/25/2020    MCV 78.4 (L) 09/25/2020    MCH 22.7 (L) 09/25/2020    MCHC 29.0 09/25/2020    RDW 18.9 (H) 09/25/2020    MPV 10.0 09/25/2020    BASOPCT 0 09/25/2020    LYMPHSABS 1.67 09/25/2020    MONOSABS 0.97 09/25/2020    NEUTROABS 6.60 09/25/2020    EOSABS <0.03 09/25/2020    BASOSABS 0.03 09/25/2020        Past Medical History:   Diagnosis Date    History of alcoholism ([facility] [address]     Hypertension           Current Facility-Administered Medications:     [START ON 9/26/2020] furosemide (LASIX) tablet 20 mg, 20 mg, Oral, Daily, GEOFF vAiles NP    polyethylene glycol (GoLYTELY) solution 2,000 mL, 2,000 mL, Oral, Once, GEOFF Orellana CNP    midodrine (PROAMATINE) tablet 10 mg, 10 mg, Oral, TID, Esther Nye MD, 10 mg at 09/25/20 1609    oxyCODONE (ROXICODONE) immediate release tablet 5 mg, 5 mg, Oral, Q8H PRN, 5 mg at 09/23/20 1217 **OR** [DISCONTINUED] oxyCODONE (ROXICODONE) immediate release tablet 10 mg, 10 mg, Oral, Q4H PRN, Esther Nye MD    potassium chloride (KLOR-CON M) extended release tablet 40 mEq, 40 mEq, Oral, PRN, 40 mEq at 09/21/20 1531 **OR** potassium bicarb-citric acid (EFFER-K) effervescent tablet 40 mEq, 40 mEq, Oral, PRN **OR** potassium chloride 10 mEq/100 mL IVPB (Peripheral Line), 10 mEq, Intravenous, PRN, Esther Nye MD    dilTIAZem (CARDIZEM) tablet 30 mg, 30 mg, Oral, 4 times per day, Olga Lidia Miranda MD, 30 mg at 09/25/20 1609    sodium chloride flush 0.9 % injection 10 mL, 10 mL, Intravenous, PRN, Ron Carolina MD, 10 mL at 09/19/20 1248    ferrous sulfate (FE TABS 325) EC tablet 325 mg, 325 mg, Oral, TID WC, Esther Nye MD, 325 mg at 09/25/20 1609    escitalopram (LEXAPRO) tablet 20 mg, 20 mg, Oral, Daily, Esther Nye MD, 20 mg at 09/25/20 0844    mirtazapine (REMERON) tablet 15 mg, 15 mg, Oral, Nightly, Esther Nye MD, 15 mg at 09/24/20 2036    magnesium oxide (MAG-OX) tablet 400 mg, 400 mg, Oral, Daily, Esther Nye MD, 400 mg at 09/25/20 0844    gabapentin (NEURONTIN) capsule 300 mg, 300 mg, Oral, TID, Esther Nye MD, 300 mg at 09/25/20 1609    [START ON 10/5/2020] vitamin D (ERGOCALCIFEROL) capsule 50,000 Units, 50,000 Units, Oral, Q30 Days, Esther Nye MD    pantoprazole (PROTONIX) tablet 40 mg, 40 mg, Oral, BID AC, Esther Nye MD, 40 mg at 09/25/20 1609    sodium chloride flush 0.9 % injection 10 mL, 10 mL, Intravenous, 2 times per day, Esther Nye MD, 10 mL at 09/25/20 0845    sodium chloride flush 0.9 % injection 10 mL, 10 mL, Intravenous, PRN, Esther Nye MD    digoxin (LANOXIN) tablet 125 mcg, 125 mcg, Oral, Daily, Esther Nye MD, 125 mcg at 09/25/20 0844    metoprolol tartrate (LOPRESSOR) tablet 12.5 mg, 12.5 mg, Oral, BID, Leif Graham MD    [Held by provider] apixaban Han Daniel) tablet 5 mg, 5 mg, Oral, BID, Leif Graham MD, Stopped at 09/21/20 2146     Ct Abdomen Pelvis W Iv Contrast    Result Date: 9/19/2020  EXAMINATION: CT OF THE ABDOMEN AND PELVIS WITH CONTRAST 9/19/2020 12:27 pm TECHNIQUE: CT of the abdomen and pelvis was performed with the administration of intravenous contrast. Multiplanar reformatted images are provided for review. Dose modulation, iterative reconstruction, and/or weight based adjustment of the mA/kV was utilized to reduce the radiation dose to as low as reasonably achievable. COMPARISON: None. HISTORY: ORDERING SYSTEM PROVIDED HISTORY: Pain TECHNOLOGIST PROVIDED HISTORY: IV Only Contrast Pain Reason for Exam: Chest pain, dizziness, palpitation, abd pain Acuity: Acute Type of Exam: Initial FINDINGS: Lower Chest: Mild to moderate right pleural effusion. Small left pleural effusion. Emphysematous changes in the lungs. Compressive atelectasis both lung bases. Coronary artery calcification. Organs: Liver contains several subcentimeter hypodensities near the dome right lobe consistent with cysts. Liver is slightly lobular in contour. The spleen, pancreas, and adrenals are unremarkable. Gallstones are noted within the gallbladder. Kidneys demonstrate no evidence of hydronephrosis or nephrolithiasis. GI/Bowel: Small amount of upper abdominal ascites and haziness in the mesentery is noted. No free air or bowel obstruction is noted. Gastric folds are prominent and thickened consistent with gastritis. Pelvis: No evidence of appendicitis. Small amount of pelvic fluid. Bladder is decompressed limiting assessment but bladder wall appears prominent. Prostate is enlarged. Bladder wall thickening may be related to hypertrophy. No inguinal adenopathy is noted. Atherosclerotic calcification of the iliac vessels is noted, moderately significant.  Peritoneum/Retroperitoneum: No aortic aneurysm. Diffuse atherosclerotic calcification of the aorta is noted, mild to moderate. An Bones/Soft Tissues: No acute osseous or soft tissue abnormality. 1.  Moderate right and small left pleural effusions with compressive atelectasis at the lung bases in this patient with emphysematous changes. 2.  Prominent gastric fold thickening most compatible with gastritis. 3.  Liver appears slightly lobular. Additionally, abdominal ascites is noted. The constellation of findings are consistent with cirrhosis. 4.  Enlarged prostate. Bladder wall appears thickened although bladder is suboptimally distended. 5.  Atherosclerotic disease. 6.  No bowel obstruction or urinary obstruction although gallstones are present. No evidence of appendicitis. Xr Chest Portable    Result Date: 9/21/2020  EXAMINATION: ONE XRAY VIEW OF THE CHEST 9/21/2020 11:16 am COMPARISON: September 19, 2020 HISTORY: ORDERING SYSTEM PROVIDED HISTORY: sob TECHNOLOGIST PROVIDED HISTORY: sob Reason for Exam: Pt c/o SOB, AP UPRIGHT PORTABLE Acuity: Acute Type of Exam: Subsequent/Follow-up FINDINGS: Stable cardiomediastinal silhouette. There is increasing bibasilar opacities which could reflect layering effusions and underlying atelectasis/infiltrates. No pneumothorax. Increasing bibasilar opacities probably reflecting very small effusions and underlying bibasilar atelectasis/infiltrates. Xr Chest Portable    Result Date: 9/19/2020  EXAMINATION: ONE XRAY VIEW OF THE CHEST 9/19/2020 12:07 pm COMPARISON: None HISTORY: ORDERING SYSTEM PROVIDED HISTORY: Chest Pain TECHNOLOGIST PROVIDED HISTORY: Chest Pain Reason for Exam: dizzy port ap upright Acuity: Unknown Type of Exam: Unknown FINDINGS: Right basilar airspace opacity likely in the right lower lobe. Otherwise clear lungs. No definite findings of pneumothorax or pleural effusion. Normal mediastinal, hilar, and cardiac contours. Atherosclerotic calcification in the aorta. Diffuse osseous demineralization. No obvious acute fracture. Joints maintain anatomic alignment. 1. Right lower lobe airspace opacity potentially due to atelectasis, pneumonia, or aspiration. 2. Bony demineralization partially limits evaluation for fractures. Ct Chest Pulmonary Embolism W Contrast    Result Date: 9/19/2020  EXAMINATION: CTA OF THE CHEST 9/19/2020 12:27 pm TECHNIQUE: CTA of the chest was performed after the administration of intravenous contrast.  Multiplanar reformatted images are provided for review. MIP images are provided for review. Dose modulation, iterative reconstruction, and/or weight based adjustment of the mA/kV was utilized to reduce the radiation dose to as low as reasonably achievable. COMPARISON: None. HISTORY: ORDERING SYSTEM PROVIDED HISTORY: Chest Pain r/o PE TECHNOLOGIST PROVIDED HISTORY: Chest Pain r/o PE Reason for Exam: Chest pain, dizziness, palpitation, abd pain Acuity: Acute Type of Exam: Initial Elevated BNP FINDINGS: Pulmonary Arteries: Pulmonary arteries are adequately opacified for evaluation. No evidence of intraluminal filling defect to suggest pulmonary embolism. Main pulmonary artery is normal in caliber. Mediastinum: No evidence of mediastinal lymphadenopathy. The heart and pericardium demonstrate no acute abnormality. Coronary artery calcification is noted. There is no acute abnormality of the thoracic aorta. Lungs/pleura: Small to moderate right pleural effusion with right basilar atelectasis is evident with trace left effusion. Emphysematous changes are present in the lungs. Tracheobronchial tree is patent. Upper Abdomen: Patient has some ascites in the upper abdomen. Hazy mesentery is noted. Atherosclerotic disease in the aorta is seen. Soft Tissues/Bones: No acute osseous or soft tissue abnormality. 1.  No evidence of pulmonary embolism. 2.  Emphysematous changes.  3. small to moderate right pleural effusion with trace left effusion and basilar atelectasis. 4.  Mild ascites upper abdomen. Ir Us Guided Paracentesis    Result Date: 9/21/2020  PROCEDURE: ULTRASOUND GUIDED PARACENTESIS 9/21/2020 HISTORY: ORDERING SYSTEM PROVIDED HISTORY: ascites TECHNOLOGIST PROVIDED HISTORY: ascites please send all fluid for cytology TECHNIQUE: Informed consent was obtained after a detailed explanation of the procedure including risks, benefits, and alternatives. Universal protocol was followed. Preprocedure ultrasound shows a dominant fluid pocket in the right lowerquadrant. Using ultrasound guidance (image attached to the medical record), the fluid pocket was accessed with a 5 [employer] Yueh needle with aspiration of clear fluid. Vacuum bottle paracentesis was performed and approximately 350 mL was removed. Post procedural ultrasound demonstrated no residual fluid. A sample was sent for laboratory analysis. the patient tolerated the procedure well and left the department in good condition. Dr. Jad Whitlock was present. FINDINGS: A total of 350 mL was removed. Successful ultrasound guided diagnostic paracentesis. Fl Modified Barium Swallow W Video    Result Date: 9/21/2020  EXAMINATION: MODIFIED BARIUM SWALLOW WAS PERFORMED IN CONJUNCTION WITH SPEECH PATHOLOGY SERVICES 09/21/2020 TECHNIQUE: Fluoroscopic evaluation of the swallowing mechanism was performed with multiple consistency of barium product. FLUOROSCOPY DOSE AND TYPE OR TIME AND EXPOSURES: 1.8 minutes fluoro time, 0.939 Gy per cm 2 DAP, single image COMPARISON: None HISTORY: ORDERING SYSTEM PROVIDED HISTORY: Right lower lobe pneumonia TECHNOLOGIST PROVIDED HISTORY: Right lower lobe pneumonia Reason for Exam: RLL pneumonia Acuity: Acute Type of Exam: Initial FINDINGS: Nectar thick liquid: No penetration or aspiration. Premature spill into the vallecula. Thin liquid by cup and straw: Trace penetration. No aspiration. Significant residue in the vallecula and laryngeal vestibule.   Premature spillage into the vallecula. Puree: No penetration or aspiration. Premature spillage into the vallecula. Trace penetration with thin liquid. No completed aspiration with any material.  Premature spillage into the vallecula. Significant residue in the vallecula and laryngeal vestibule with liquid. Please see separate speech pathology report for full discussion of findings and recommendations. RECOMMENDATIONS: Please see speech therapy notes for complete information. Intake/Output Summary (Last 24 hours) at 9/25/2020 3276  Last data filed at 9/25/2020 1613  Gross per 24 hour   Intake --   Output 1200 ml   Net -1200 ml        IMPRESSION / RECOMMENDATIONS: Mr. Rosaura Griffith is a 77 y.o. male followed for management of weight loss. Plan for EGD and colonoscopy. Possibly tomorrow. Optimize control of atrial fibrillation.       MD Meghan Mari

## 2020-09-25 NOTE — PROGRESS NOTES
Pt only completed half of the original prep overnight. Pt has not been NPO. Notified GI Karen Garber CNP via perfect serve that the patient is not clear for procedure and that there is an order for NPO. Awaiting orders.

## 2020-09-25 NOTE — PROGRESS NOTES
RLE, telemetry, RUE IV  Subjective   General  Chart Reviewed: Yes  Response To Previous Treatment: Patient with no complaints from previous session. Family / Caregiver Present: No  Subjective  Subjective: Pt states 3/10 pain in RLE, but upon moving stated he had pain in hus neck, ribs, and phantom pain. Orientation  Orientation  Overall Orientation Status: Within Functional Limits  Orientation Level: Oriented to place;Oriented to person;Oriented to situation;Oriented to time  Cognition   Cognition  Overall Cognitive Status: Exceptions  Arousal/Alertness: Appropriate responses to stimuli  Following Commands: Follows one step commands with repetition; Follows one step commands with increased time  Attention Span: Appears intact  Memory: Decreased recall of recent events;Decreased long term memory;Decreased recall of biographical Information  Safety Judgement: Decreased awareness of need for assistance;Decreased awareness of need for safety  Problem Solving: Assistance required to generate solutions;Assistance required to identify errors made;Assistance required to correct errors made;Assistance required to implement solutions  Insights: Decreased awareness of deficits  Initiation: Requires cues for some  Sequencing: Requires cues for some  Objective   Bed mobility  Rolling to Left: Maximum assistance;2 Person assistance  Rolling to Right: Maximum assistance;2 Person assistance  Supine to Sit: Maximum assistance;2 Person assistance  Sit to Supine: Minimal assistance;2 Person assistance  Scooting: Dependent/Total  Comment: Pt required verbal/tactile cueing while rolling left<>right and maxA x2 to get hips fully into side lying position. When rolling pt yelled out stating he has neck pain. Pt was able to grab onto rail and assist pushing self into sitting position at EOB, but required maxA x2 with use of nate pad. Pt was able to sit EOB ~2mins before requesting to lay back in bed.  Pt was able to get back in bed with less assist, but required total to reposition/scoot. Transfers  Comment: Unsafe to assess this date. Ambulation  Ambulation?: No(pt non-ambulatory at baseline)        Exercises  Comments: Attempted exercises with pt this date, although he stated he was in too much pain to complete them all. Finished ankle pumps. OutComes Score     AM-PAC Score  AM-PAC Inpatient Mobility Raw Score : 8 (09/24/20 1433)  AM-PAC Inpatient T-Scale Score : 28.52 (09/24/20 1433)  Mobility Inpatient CMS 0-100% Score: 86.62 (09/24/20 1433)  Mobility Inpatient CMS G-Code Modifier : CM (09/24/20 1433)          Goals  Short term goals  Time Frame for Short term goals: 12 visits  Short term goal 1: Pt will roll left<>right with SBA and use of handrails  Short term goal 2: Pt will scoot self in bed with SBA in order to reposition self. Short term goal 3: Pt will tolerate 25mins of PT to improve activity tolerance, strength, and functional mobility  Short term goal 4: Pt will perform supine<>sit with modA x2  Patient Goals   Patient goals : To get better    Plan    Plan  Times per week: 1-2x day / 5-6 days per week  Specific instructions for Next Treatment: if tolerable, assess supine<>sit  Current Treatment Recommendations: Strengthening, Functional Mobility Training, Positioning, Safety Education & Training, Endurance Training  Safety Devices  Type of devices: All fall risk precautions in place, Bed alarm in place, Call light within reach, Nurse notified, Left in bed     Therapy Time   Individual Concurrent Group Co-treatment   Time In 200         Time Out 1026         Minutes 24             Co-treatment with OT warranted secondary to decreased safety and independence requiring 2 skilled therapy professionals to address individual discipline's goals. PT addressing postural control in sitting.         Bridger Perez, PT

## 2020-09-25 NOTE — CARE COORDINATION
Discharge planning    Patient chart reviewed. Per SS patient needs OT note to start precert.  Call and left VM to request they see him this am..     ROGER in epic

## 2020-09-25 NOTE — PROGRESS NOTES
Notified CNP Gianfranco of low urine output and elevated HR- pt is in fluid overload and edematous. Per Mae Angulo CNP, ok for IVP lasix 40 mg x1 and will continue with daily dosing.

## 2020-09-25 NOTE — PROGRESS NOTES
[facility]  Progress Note    Lida Velez  MRN: [id number]  Date: 9/25/2020  LOS: 6     Reason for F/U :   Sepsis    Impression :   1. Right-sided effusion with associated atelectasis  · No clinical evidence of pneumonia  2. Cirrhosis with associated ascites  · Status post paracentesis 9/21/2020  3. A. fib with RVR  4. Coagulase-negative staph in 1 out of 2 sets of blood cultures-likely a contaminant  5. Protein calorie malnutrition    Recommendations:   · The patient is undergone work-up and there is no evidence of an acute infectious process. · He has remained stable off antimicrobial therapy for 48 hours now. · From an infectious disease standpoint the patient is okay for discharge. · I will sign off please call if I can be of any further assistance    Infection Control Recommendations:   Universal precautions    Discharge Planning:   Patient will need Midline Catheter Insertion/ PICC line Insertion: No  Patient will need: Home IV , [place],  SNF,  LTAC: Undetermined  Patient willneed outpatient wound care: No    Medical Decision making / Summary of Stay:   Lida Velez is a 77y.o.-year-old male nursing home resident presented to hospital with dizziness no palpitations and not feeling well associated with mild diffuse abdominal discomfort was found to have A. fib with rapid ventricular response. Poor historian  He was hypotensive initially received fluid bolus. Initial WBC was elevated at 17, renal function normal, lactic acid 2.7 and normal, C-reactive protein 99, urinalysis unremarkable, procalcitonin 10.59, amylase and lipase unremarkable  9/19/2020 CT  abdomen suggestive of liver cirrhosis and stomach and bladder wall thickening. Chest x-ray showed right lower lobe airspace opacity. 9/19/2020 CT chest showed no evidence of pulmonary embolism, small to moderate right pleural effusion with trace left effusion and bibasilar atelectasis, mild ascites.     Current 8. 5* 8.2*   HCT 29.6* 28.3*    323   LYMPHOPCT 17* 18*   MONOPCT 8 10     BMP:   Recent Labs     09/24/20  0502 09/25/20  0536   * 136   K 4.3 4.1    108*   CO2 21 20   BUN 15 16   CREATININE 0.53* 0.51*     Hepatic Function Panel:   No results for input(s): PROT, LABALBU, BILIDIR, IBILI, BILITOT, ALKPHOS, ALT, AST in the last 72 hours. Lab Results   Component Value Date    PROCAL 48.67 09/21/2020    PROCAL 10.59 09/19/2020     Lab Results   Component Value Date    CRP 99.0 09/19/2020     Lab Results   Component Value Date    SEDRATE 12 09/19/2020         No results found for: DDIMER  Lab Results   Component Value Date    FERRITIN 79 09/20/2020     No results found for: LDH  No results found for: FIBRINOGEN    Results in Past 30 Days  Result Component Current Result Ref Range Previous Result Ref Range   SARS-CoV-2      (9/21/2020)  Not in Time Range          (9/21/2020)       Not Detected (9/21/2020) Not Detected       Lab Results   Component Value Date    COVID19 Not Detected 09/21/2020       No results for input(s): [facility] in the last 72 hours. Color, Fluid  NOT REPORTED   Final  09/21/2020 12:00 PM  [address] [facility]    Appearance, Fluid  NOT REPORTED   Final  09/21/2020 12:00 PM  [address] [facility]    WBC, Fluid  43  /mm3  Final  09/21/2020 12:00 PM  [address]       RBC, Fluid  <3,000  /mm3  Final  09/21/2020 12:00 PM  [address]       Specimen Type  . PARACENTESIS FLUID   Final  09/21/2020 12:00 PM  [address] [facility]    Neutrophil Count, Fluid  67  %  Final  09/21/2020 12:00 PM  [address]       Lymphocytes, Body Fluid  20  %  Final  09/21/2020 12:00 PM  [address]       Monocyte Count, Fluid  NOT REPORTED  %  Final  09/21/2020 12:00 PM  [address]    Eos, Fluid  NOT REPORTED  0 %  Final  09/21/2020 12:00 PM  [address]    Basos, Fluid  NOT REPORTED  0 % Completed  Specimen: Blood  Updated: 09/22/20 0518     Specimen Description  . BLOOD     Special Requests  NOT REPORTED     Culture  POSITIVE Blood Culture Results called to and read back by: 0850 09/20/2020 MICHAEL Proctor        DIRECT GRAM STAIN FROM BOTTLE: GRAM POSITIVE COCCI IN CLUSTERS      Coagulase negative Staphylococcus species detected by PCRAbnormal        STAPHYLOCOCCUS SPECIES, COAGULASE NEGATIVE A single positive blood culture of coagulase negative Staphylocci, diptheroids, micrococci, Cutibacterium, viridans Streptocci, Bacillus, or Lactobacillus species should be interpreted with caution and viewed as a likely skin contaminant. Abnormal         Medications:      polyethylene glycol  2,000 mL Oral Once    midodrine  10 mg Oral TID    dilTIAZem  30 mg Oral 4 times per day    ferrous sulfate  325 mg Oral TID WC    escitalopram  20 mg Oral Daily    mirtazapine  15 mg Oral Nightly    magnesium oxide  400 mg Oral Daily    gabapentin  300 mg Oral TID    [START ON 10/5/2020] vitamin D  50,000 Units Oral Q30 Days    pantoprazole  40 mg Oral BID AC    sodium chloride flush  10 mL Intravenous 2 times per day    digoxin  125 mcg Oral Daily    [Held by provider] metoprolol tartrate  12.5 mg Oral BID    [Held by provider] apixaban  5 mg Oral BID           [facility]  Odalys Zelaya MD  Perfect Serve messaging  OFFICE: [phone]      Electronically signed by Odalys Zelaya MD on 9/25/2020 at 9:30 AM  Thank you for allowing us to participate in the care of this patient. Please call with questions. This note iscreated with the assistance of a speech recognition program.  While intending to generate a document that actually reflects the content of the visit, the document can still have some errors including those of syntax andsound a like substitutions which may escape proof reading. In such instances, actual meaning can be extrapolated by contextual diversion.

## 2020-09-26 PROBLEM — E43 SEVERE MALNUTRITION (HCC): Chronic | Status: ACTIVE | Noted: 2020-09-26

## 2020-09-26 LAB
ABSOLUTE EOS #: <0.03 K/UL (ref 0–0.44)
ABSOLUTE IMMATURE GRANULOCYTE: 0.25 K/UL (ref 0–0.3)
ABSOLUTE LYMPH #: 2.04 K/UL (ref 1.1–3.7)
ABSOLUTE MONO #: 0.84 K/UL (ref 0.1–1.2)
ANION GAP SERPL CALCULATED.3IONS-SCNC: 10 MMOL/L (ref 9–17)
BASOPHILS # BLD: 0 % (ref 0–2)
BASOPHILS ABSOLUTE: 0.03 K/UL (ref 0–0.2)
BUN BLDV-MCNC: 16 MG/DL (ref 8–23)
BUN/CREAT BLD: 22 (ref 9–20)
CALCIUM SERPL-MCNC: 7.3 MG/DL (ref 8.6–10.4)
CHLORIDE BLD-SCNC: 105 MMOL/L (ref 98–107)
CO2: 22 MMOL/L (ref 20–31)
CREAT SERPL-MCNC: 0.74 MG/DL (ref 0.7–1.2)
DIFFERENTIAL TYPE: ABNORMAL
EOSINOPHILS RELATIVE PERCENT: 0 % (ref 1–4)
GFR AFRICAN AMERICAN: >60 ML/MIN
GFR NON-AFRICAN AMERICAN: >60 ML/MIN
GFR SERPL CREATININE-BSD FRML MDRD: ABNORMAL ML/MIN/{1.73_M2}
GFR SERPL CREATININE-BSD FRML MDRD: ABNORMAL ML/MIN/{1.73_M2}
GLUCOSE BLD-MCNC: 84 MG/DL (ref 70–99)
HCT VFR BLD CALC: 31.5 % (ref 40.7–50.3)
HEMOGLOBIN: 9 G/DL (ref 13–17)
IMMATURE GRANULOCYTES: 2 %
LYMPHOCYTES # BLD: 15 % (ref 24–43)
MCH RBC QN AUTO: 22.9 PG (ref 25.2–33.5)
MCHC RBC AUTO-ENTMCNC: 28.6 G/DL (ref 28.4–34.8)
MCV RBC AUTO: 80.2 FL (ref 82.6–102.9)
MONOCYTES # BLD: 6 % (ref 3–12)
NRBC AUTOMATED: 0 PER 100 WBC
PDW BLD-RTO: 19 % (ref 11.8–14.4)
PLATELET # BLD: 344 K/UL (ref 138–453)
PLATELET ESTIMATE: ABNORMAL
PMV BLD AUTO: 10.7 FL (ref 8.1–13.5)
POTASSIUM SERPL-SCNC: 3.5 MMOL/L (ref 3.7–5.3)
RBC # BLD: 3.93 M/UL (ref 4.21–5.77)
RBC # BLD: ABNORMAL 10*6/UL
SEG NEUTROPHILS: 77 % (ref 36–65)
SEGMENTED NEUTROPHILS ABSOLUTE COUNT: 10.51 K/UL (ref 1.5–8.1)
SODIUM BLD-SCNC: 137 MMOL/L (ref 135–144)
WBC # BLD: 13.7 K/UL (ref 3.5–11.3)
WBC # BLD: ABNORMAL 10*3/UL

## 2020-09-26 PROCEDURE — 2580000003 HC RX 258: Performed by: FAMILY MEDICINE

## 2020-09-26 PROCEDURE — 2060000000 HC ICU INTERMEDIATE R&B

## 2020-09-26 PROCEDURE — 99232 SBSQ HOSP IP/OBS MODERATE 35: CPT | Performed by: INTERNAL MEDICINE

## 2020-09-26 PROCEDURE — 80048 BASIC METABOLIC PNL TOTAL CA: CPT

## 2020-09-26 PROCEDURE — 2580000003 HC RX 258: Performed by: INTERNAL MEDICINE

## 2020-09-26 PROCEDURE — 97110 THERAPEUTIC EXERCISES: CPT

## 2020-09-26 PROCEDURE — 36415 COLL VENOUS BLD VENIPUNCTURE: CPT

## 2020-09-26 PROCEDURE — 6370000000 HC RX 637 (ALT 250 FOR IP): Performed by: INTERNAL MEDICINE

## 2020-09-26 PROCEDURE — 85025 COMPLETE CBC W/AUTO DIFF WBC: CPT

## 2020-09-26 PROCEDURE — 6370000000 HC RX 637 (ALT 250 FOR IP): Performed by: NURSE PRACTITIONER

## 2020-09-26 PROCEDURE — 6370000000 HC RX 637 (ALT 250 FOR IP): Performed by: FAMILY MEDICINE

## 2020-09-26 RX ORDER — SODIUM CHLORIDE 9 MG/ML
INJECTION, SOLUTION INTRAVENOUS CONTINUOUS
Status: DISCONTINUED | OUTPATIENT
Start: 2020-09-26 | End: 2020-09-26

## 2020-09-26 RX ORDER — SODIUM CHLORIDE 9 MG/ML
INJECTION, SOLUTION INTRAVENOUS CONTINUOUS
Status: DISCONTINUED | OUTPATIENT
Start: 2020-09-26 | End: 2020-09-29 | Stop reason: HOSPADM

## 2020-09-26 RX ADMIN — METOPROLOL TARTRATE 12.5 MG: 25 TABLET, FILM COATED ORAL at 21:14

## 2020-09-26 RX ADMIN — DIGOXIN 125 MCG: 125 TABLET ORAL at 07:41

## 2020-09-26 RX ADMIN — DILTIAZEM HYDROCHLORIDE 30 MG: 30 TABLET, FILM COATED ORAL at 04:05

## 2020-09-26 RX ADMIN — GABAPENTIN 300 MG: 300 CAPSULE ORAL at 13:40

## 2020-09-26 RX ADMIN — ESCITALOPRAM OXALATE 20 MG: 10 TABLET ORAL at 07:40

## 2020-09-26 RX ADMIN — Medication 10 ML: at 07:40

## 2020-09-26 RX ADMIN — FERROUS SULFATE TAB EC 325 MG (65 MG FE EQUIVALENT) 325 MG: 325 (65 FE) TABLET DELAYED RESPONSE at 18:28

## 2020-09-26 RX ADMIN — DILTIAZEM HYDROCHLORIDE 30 MG: 30 TABLET, FILM COATED ORAL at 13:40

## 2020-09-26 RX ADMIN — MAGNESIUM GLUCONATE 500 MG ORAL TABLET 400 MG: 500 TABLET ORAL at 07:40

## 2020-09-26 RX ADMIN — POTASSIUM CHLORIDE 40 MEQ: 20 TABLET, EXTENDED RELEASE ORAL at 06:16

## 2020-09-26 RX ADMIN — PANTOPRAZOLE SODIUM 40 MG: 40 TABLET, DELAYED RELEASE ORAL at 18:28

## 2020-09-26 RX ADMIN — MIDODRINE HYDROCHLORIDE 10 MG: 10 TABLET ORAL at 21:14

## 2020-09-26 RX ADMIN — GABAPENTIN 300 MG: 300 CAPSULE ORAL at 07:41

## 2020-09-26 RX ADMIN — MIRTAZAPINE 15 MG: 15 TABLET, FILM COATED ORAL at 21:14

## 2020-09-26 RX ADMIN — SODIUM CHLORIDE: 9 INJECTION, SOLUTION INTRAVENOUS at 13:43

## 2020-09-26 RX ADMIN — FERROUS SULFATE TAB EC 325 MG (65 MG FE EQUIVALENT) 325 MG: 325 (65 FE) TABLET DELAYED RESPONSE at 07:41

## 2020-09-26 RX ADMIN — METOPROLOL TARTRATE 12.5 MG: 25 TABLET, FILM COATED ORAL at 07:40

## 2020-09-26 RX ADMIN — DILTIAZEM HYDROCHLORIDE 30 MG: 30 TABLET, FILM COATED ORAL at 21:14

## 2020-09-26 RX ADMIN — FUROSEMIDE 20 MG: 20 TABLET ORAL at 07:40

## 2020-09-26 RX ADMIN — FERROUS SULFATE TAB EC 325 MG (65 MG FE EQUIVALENT) 325 MG: 325 (65 FE) TABLET DELAYED RESPONSE at 13:40

## 2020-09-26 RX ADMIN — PANTOPRAZOLE SODIUM 40 MG: 40 TABLET, DELAYED RELEASE ORAL at 07:41

## 2020-09-26 RX ADMIN — MIDODRINE HYDROCHLORIDE 10 MG: 10 TABLET ORAL at 07:40

## 2020-09-26 RX ADMIN — Medication 10 ML: at 21:21

## 2020-09-26 RX ADMIN — GABAPENTIN 300 MG: 300 CAPSULE ORAL at 21:14

## 2020-09-26 RX ADMIN — MIDODRINE HYDROCHLORIDE 10 MG: 10 TABLET ORAL at 13:40

## 2020-09-26 ASSESSMENT — ENCOUNTER SYMPTOMS
COUGH: 0
WHEEZING: 0
SHORTNESS OF BREATH: 0
ABDOMINAL PAIN: 0
CONSTIPATION: 0
SINUS PAIN: 0
CHEST TIGHTNESS: 0
VOMITING: 0
BLOOD IN STOOL: 0
SINUS PRESSURE: 0
COLOR CHANGE: 0
DIARRHEA: 1
NAUSEA: 0

## 2020-09-26 ASSESSMENT — PAIN SCALES - GENERAL
PAINLEVEL_OUTOF10: 0

## 2020-09-26 NOTE — PROGRESS NOTES
[facility]  Office: [address], DO, Lorie Shaffer, DO, Danita Burger, DO, Tuadelaida Wilson, DO, Hedy Jackson MD, Reggie Parker MD, Karina Nur MD, Dyana Antony MD, Edelmira Johnson MD, Lina Elkins MD, Siena De La Rosa MD, Marycruz Pillai MD, Cal Chan MD, Zoey Dominguez DO, Zoraida Teixeira MD, Maria Guadalupe Quintero MD, Rasta Morton DO, Helen Dempsey MD,  Ravi Galvan, DO, Yissel Chamberlain MD, Renita Chen MD, Jakob Howard, [facility], [facility], [facility], Jillian Linda, CNP, Maddi Dumont, CNS, Andrzej Guallpa, CNP, Emiliano Helms, CNP, Liliana Niño, CNP, Dionicia Frankel, CNP, Kalen Herzog, CNP, Jessa Garcia PA-C, Carlos Hope, [facility], Saba Reis, CNP, Nam Bates, CNP, William Aranda, CNP, Kem Desai, CNP, Rita Krueger, [facility]   [address]    Progress Note    9/26/2020    12:37 PM    Name:   Precious Lai  MRN:     [id number]     Acct:      [de-identified]   Room:   [address] [place] [address] Day:  7  Admit Date:  9/19/2020 11:24 AM    PCP:   GEOFF Maciel CNP  Code Status:  Full Code    Subjective:     C/C:   Chief Complaint   Patient presents with    Dizziness    Palpitations     Interval History Status: not changed. Condition is again essentially stable. Patient is apparently agreeable to skilled nursing facility for rehabilitation. We await GI intervention versus decision to follow as an outpatient. Patient states he will work with PT/OT with the intention of improving his quality of life. Patient refuses hospice and palliative care. Occult stool is positive, however, hemoglobin is stable for the past 5 days. ID has the escalated therapy. Fluids stopped as patient is experiencing peripheral edema. Brief History:     9/19 -very poor historian. Known history of A. fib. Known history of alcoholism. Known history of cancer, likely malignant melanoma.   Admitted from the nursing home with near syncope and palpitations. Patient is found to have A. fib with RVR and hypotension patient was treated according to ACLS protocols and cardiology was consulted    9/20 -patient is found to have decreased liver function, ascites, anemia requiring transfusion, elevated white count, and a single positive blood culture. Infectious disease, cardiology, gastroenterology are requested. 9/21 -patient condition has not changed. Patient heart rate and blood pressure is better controlled today. Hemoglobin continues to trend down. Infectious disease continues to follow and is narrowing antimicrobial therapy. We await paracentesis results. 9/22 -transition to oral medications. Await GI recommendations on anemia work-up. Paracentesis results still pending. 9/23 -GI apparently plans scopes for tomorrow. Paracentesis is negative for malignancy, culture results pending. 9/24 -patient condition essentially stable. Cardiology and infectious disease have de-escalate therapy. Anticipate discharge to skilled nursing facility for continued rehabilitation. 9/25 -we await GI intervention versus decision to follow as an outpatient. Patient is agreeable to SNF. Precertification and placement decisions are pending. September 26,  EGD and colonoscopy canceled at this time due to poor prep and patient noncompliant with GoLYTELY,  Will be done as outpatient,  Given a dose of Lasix last evening, blood pressure has been running low since then,  Started on IV Fortaz his urine output has dropped to 50 in 4 hours,  We will monitor today possible discharge tomorrow after PT. Review of Systems:     Review of Systems   Constitutional: Positive for activity change, appetite change and fatigue. Negative for chills, diaphoresis and fever. HENT: Negative for congestion, sinus pressure and sinus pain. Eyes: Negative for visual disturbance. Respiratory: Negative for cough, chest tightness, shortness of breath and wheezing. Cardiovascular: Positive for leg swelling. Negative for chest pain and palpitations. Gastrointestinal: Positive for diarrhea. Negative for abdominal pain, blood in stool, constipation, nausea and vomiting. Endocrine: Negative for cold intolerance and heat intolerance. Genitourinary: Negative for difficulty urinating, flank pain and frequency. Musculoskeletal: Positive for arthralgias. Skin: Positive for pallor. Negative for color change. Neurological: Positive for weakness and light-headedness. Negative for dizziness, numbness and headaches. Psychiatric/Behavioral: Negative for agitation and confusion. All other systems reviewed and are negative. Medications: Allergies:  No Known Allergies    Current Meds:   Scheduled Meds:    furosemide  20 mg Oral Daily    polyethylene glycol  2,000 mL Oral Once    midodrine  10 mg Oral TID    dilTIAZem  30 mg Oral 4 times per day    ferrous sulfate  325 mg Oral TID WC    escitalopram  20 mg Oral Daily    mirtazapine  15 mg Oral Nightly    magnesium oxide  400 mg Oral Daily    gabapentin  300 mg Oral TID    [START ON 10/5/2020] vitamin D  50,000 Units Oral Q30 Days    pantoprazole  40 mg Oral BID AC    sodium chloride flush  10 mL Intravenous 2 times per day    digoxin  125 mcg Oral Daily    metoprolol tartrate  12.5 mg Oral BID    [Held by provider] apixaban  5 mg Oral BID     Continuous Infusions:    sodium chloride       PRN Meds: oxyCODONE **OR** [DISCONTINUED] oxyCODONE, potassium chloride **OR** potassium alternative oral replacement **OR** potassium chloride, sodium chloride flush, sodium chloride flush    Data:     Past Medical History:   has a past medical history of History of alcoholism ([address] and Hypertension. Social History:   reports that he has been smoking. He has been smoking about 0.50 packs per day. He has never used smokeless tobacco. He reports current alcohol use. He reports that he does not use drugs.      Family History: History reviewed. No pertinent family history. Vitals:  BP (!) 108/54   Pulse 76   Temp 97.7 °F (36.5 °C) (Axillary)   Resp 18   Ht 5' 10\" (1.778 m)   Wt 113 lb 3 oz (51.3 kg)   SpO2 96%   BMI 16.24 kg/m²   Temp (24hrs), Av.4 °F (36.3 °C), Min:97.2 °F (36.2 °C), Max:97.7 °F (36.5 °C)    No results for input(s): POCGLU in the last 72 hours. I/O (24Hr): Intake/Output Summary (Last 24 hours) at 2020 1237  Last data filed at 2020 1221  Gross per 24 hour   Intake 150 ml   Output 1550 ml   Net -1400 ml       Labs:  Hematology:  Recent Labs     20  0520  0456   WBC 9.9 9.4 13.7*   RBC 3.72* 3.61* 3.93*   HGB 8.5* 8.2* 9.0*   HCT 29.6* 28.3* 31.5*   MCV 79.6* 78.4* 80.2*   MCH 22.8* 22.7* 22.9*   MCHC 28.7 29.0 28.6   RDW 18.7* 18.9* 19.0*    323 344   MPV 9.7 10.0 10.7     Chemistry:  Recent Labs     20  05020  0520  0456   * 136 137   K 4.3 4.1 3.5*    108* 105   CO2 21 20 22   GLUCOSE 100* 87 84   BUN 15 16 16   CREATININE 0.53* 0.51* 0.74   ANIONGAP 6* 8* 10   LABGLOM >60 >60 >60   GFRAA >60 >60 >60   CALCIUM 7.0* 7.0* 7.3*     No results for input(s): PROT, LABALBU, LABA1C, U8KBSGC, W9LYEZE, FT4, TSH, AST, ALT, LDH, GGT, ALKPHOS, LABGGT, BILITOT, BILIDIR, AMMONIA, AMYLASE, LIPASE, LACTATE, CHOL, HDL, LDLCHOLESTEROL, CHOLHDLRATIO, TRIG, VLDL, UIW84TV, PHENYTOIN, PHENYF, URICACID, POCGLU in the last 72 hours. ABG:No results found for: POCPH, PHART, PH, POCPCO2, BIZ0ZOM, PCO2, POCPO2, PO2ART, PO2, POCHCO3, GBJ8TVD, HCO3, NBEA, PBEA, BEART, BE, THGBART, THB, CID2NER, FXIO6KCC, W3HNBKXQ, O2SAT, FIO2  Lab Results   Component Value Date/Time    SPECIAL NOT REPORTED 2020 12:00 PM     Lab Results   Component Value Date/Time    CULTURE NO GROWTH 5 DAYS 2020 12:00 PM       Radiology:  Ct Abdomen Pelvis W Iv Contrast    Result Date: 2020  1.   Moderate right and small left pleural effusions with compressive atelectasis at the lung bases in this patient with emphysematous changes. 2.  Prominent gastric fold thickening most compatible with gastritis. 3.  Liver appears slightly lobular. Additionally, abdominal ascites is noted. The constellation of findings are consistent with cirrhosis. 4.  Enlarged prostate. Bladder wall appears thickened although bladder is suboptimally distended. 5.  Atherosclerotic disease. 6.  No bowel obstruction or urinary obstruction although gallstones are present. No evidence of appendicitis. Xr Chest Portable    Result Date: 9/19/2020  1. Right lower lobe airspace opacity potentially due to atelectasis, pneumonia, or aspiration. 2. Bony demineralization partially limits evaluation for fractures. Ct Chest Pulmonary Embolism W Contrast    Result Date: 9/19/2020  1. No evidence of pulmonary embolism. 2.  Emphysematous changes. 3. small to moderate right pleural effusion with trace left effusion and basilar atelectasis. 4.  Mild ascites upper abdomen. Physical Examination:        Physical Exam  Vitals signs and nursing note reviewed. Constitutional:       General: He is not in acute distress. Appearance: He is well-developed. He is not diaphoretic. Comments: Underweight and malnourished, poor personal hygiene   HENT:      Head: Normocephalic and atraumatic. Right Ear: Hearing normal.      Left Ear: Hearing normal.      Nose: Nose normal. No rhinorrhea. Eyes:      General: Lids are normal.      Extraocular Movements:      Right eye: Normal extraocular motion. Left eye: Normal extraocular motion. Conjunctiva/sclera: Conjunctivae normal.      Right eye: Right conjunctiva is not injected. Left eye: Left conjunctiva is not injected. Pupils: Pupils are equal, round, and reactive to light. Pupils are equal.      Right eye: Pupil is reactive. Left eye: Pupil is reactive. Neck:      Musculoskeletal: Neck supple.       Thyroid: No thyromegaly. Vascular: No carotid bruit. Trachea: Trachea and phonation normal. No tracheal deviation. Cardiovascular:      Rate and Rhythm: Normal rate. Rhythm irregular. Pulses: Normal pulses. Heart sounds: Normal heart sounds. No murmur. Pulmonary:      Effort: Pulmonary effort is normal. No respiratory distress. Breath sounds: No stridor. Examination of the right-lower field reveals decreased breath sounds. Decreased breath sounds present. Abdominal:      General: Bowel sounds are normal. There is no distension. Palpations: Abdomen is soft. There is no mass. Tenderness: There is no abdominal tenderness. There is no guarding. Musculoskeletal:         General: No tenderness. Right lower leg: Edema present. Comments: Left above-knee amputation   Skin:     General: Skin is warm and dry. Findings: No erythema, lesion or rash.         Assessment:        Hospital Problems           Last Modified POA    * (Principal) Atrial fibrillation with RVR ([address] 9/19/2020 Yes    Elevated brain natriuretic peptide (BNP) level 9/19/2020 Yes    History of alcoholism ([address] 9/19/2020 Yes    Cirrhosis of liver with ascites ([address] 9/19/2020 Yes    Chronic atrial fibrillation 9/19/2020 Yes    Leukocytosis 9/19/2020 Yes    Anemia 9/21/2020 Yes    Thrombocytosis ([address] 9/19/2020 Yes    Severe malnutrition ([address] (Chronic) 9/26/2020 Yes    Sarcopenia 9/19/2020 Yes    Hyponatremia 9/19/2020 Yes    Sepsis ([address] 9/20/2020 Yes    Malignant melanoma of lower extremity, including hip ([address] 9/20/2020 Yes    Overview Signed 9/20/2020  3:34 PM by GEOFF Valle CNP     Hx of left foot with left groin lymph node positive for melanoma         Dizziness 9/20/2020 Yes    Acute on chronic diastolic heart failure ([address] 9/22/2020 Yes    Acute on chronic congestive heart failure ([address] 9/22/2020 Yes          Plan:          · Hypotension after getting a dose of Lasix last night and tachycardia, will start the patient on IV fluids,  · Low Urine output, 50 mL in 4 hours, start on IV fluids  · Stop antibiotic therapy per ID  · Digoxin and Cardizem oral transition went well. · Eliquis was on hold for GI evaluation, hemoglobin running on the lower side, EGD colonoscopy has been postponed due to patient not drinking GoLYTELY and being compliant with advice. Not a good candidate for long-term anticoagulation anyway can be high risk for bleeding.   · Chronic atrial fibrillation was on Eliquis, on hold at this time, rechecked hemoglobin tomorrow morning restart anticoagulation  · PT/OT for outpatient recommendations on discharge, anticipate SNF, patient still agreeable, pre-CERT n [id number] pending  · Nectar thick diet due to swallow study recommendations  · Continue to follow paracentesis cultures, thus far negative, negative for malignancy  · Severe malnutrition, p.o. intake not at home    Prognosis seems poor        Isidoro Hunter MD  9/26/2020  12:37 PM

## 2020-09-26 NOTE — PROGRESS NOTES
GI Progress notes    9/26/2020   9:50 AM    Name:  Cat Leos  MRN:    [id number]     Acct:     [de-identified]   Room:  [address] [place] [address] Day: 7     Admit Date: 9/19/2020 11:24 AM  PCP: GEOFF Cortes CNP    Subjective:     C/C:   Chief Complaint   Patient presents with    Dizziness    Palpitations       Interval History: Status: not changed. Patient seen and examined. No acute events overnight. Patient was prepped for colonoscopy unfortunately he is not cleaned out well. Had large bowel movement on examination this morning; nonbloody. Hgb stable. However hemoccult positive. Has peripheral edema. Has severe malnutrition. Hx of alcoholism and cirrhosis of the liver  S/p paracentesis 9/21  No reported nausea, vomiting. No reported abdominal pains. ROS:  Constitutional: negative for chills, fevers and sweats  Gastrointestinal: negative for abdominal pain, constipation, nausea and vomiting      Medications:      Allergies: No Known Allergies    Current Meds: furosemide (LASIX) tablet 20 mg, Daily  polyethylene glycol (GoLYTELY) solution 2,000 mL, Once  midodrine (PROAMATINE) tablet 10 mg, TID  oxyCODONE (ROXICODONE) immediate release tablet 5 mg, Q8H PRN  potassium chloride (KLOR-CON M) extended release tablet 40 mEq, PRN    Or  potassium bicarb-citric acid (EFFER-K) effervescent tablet 40 mEq, PRN    Or  potassium chloride 10 mEq/100 mL IVPB (Peripheral Line), PRN  dilTIAZem (CARDIZEM) tablet 30 mg, 4 times per day  sodium chloride flush 0.9 % injection 10 mL, PRN  ferrous sulfate (FE TABS 325) EC tablet 325 mg, TID WC  escitalopram (LEXAPRO) tablet 20 mg, Daily  mirtazapine (REMERON) tablet 15 mg, Nightly  magnesium oxide (MAG-OX) tablet 400 mg, Daily  gabapentin (NEURONTIN) capsule 300 mg, TID  [START ON 10/5/2020] vitamin D (ERGOCALCIFEROL) capsule 50,000 Units, Q30 Days  pantoprazole (PROTONIX) tablet 40 mg, BID AC  sodium chloride flush 0.9 % injection 10 mL, 2 times per day  sodium chloride flush 0.9 % injection 10 mL, PRN  digoxin (LANOXIN) tablet 125 mcg, Daily  metoprolol tartrate (LOPRESSOR) tablet 12.5 mg, BID  [Held by provider] apixaban (ELIQUIS) tablet 5 mg, BID        Data:     Code Status:  Full Code    History reviewed. No pertinent family history.     Social History     Socioeconomic History    Marital status: Single     Spouse name: Not on file    Number of children: Not on file    Years of education: Not on file    Highest education level: Not on file   Occupational History    Not on file   Social Needs    Financial resource strain: Not on file    Food insecurity     Worry: Not on file     Inability: Not on file    Transportation needs     Medical: Not on file     Non-medical: Not on file   Tobacco Use    Smoking status: Current Every Day Smoker     Packs/day: 0.50    Smokeless tobacco: Never Used   Substance and Sexual Activity    Alcohol use: Yes     Comment: former alcoholic    Drug use: No    Sexual activity: Not on file   Lifestyle    Physical activity     Days per week: Not on file     Minutes per session: Not on file    Stress: Not on file   Relationships    Social connections     Talks on phone: Not on file     Gets together: Not on file     Attends [employer] service: Not on file     Active member of club or organization: Not on file     Attends meetings of clubs or organizations: Not on file     Relationship status: Not on file    Intimate partner violence     Fear of current or ex partner: Not on file     Emotionally abused: Not on file     Physically abused: Not on file     Forced sexual activity: Not on file   Other Topics Concern    Not on file   Social History Narrative    Not on file       Vitals:  /64   Pulse 105   Temp 97.3 °F (36.3 °C) (Oral)   Resp 16   Ht 5' 10\" (1.778 m)   Wt 113 lb 3 oz (51.3 kg)   SpO2 95%   BMI 16.24 kg/m²   Temp (24hrs), Av.6 °F (36.4 °C), Min:97.2 °F (36.2 °C), Max:98.5 °F (36.9 °C)    No results for input(s): POCGLU in the last 72 hours. I/O (24Hr):     Intake/Output Summary (Last 24 hours) at 9/26/2020 0950  Last data filed at 9/26/2020 0624  Gross per 24 hour   Intake --   Output 1500 ml   Net -1500 ml       Labs:      CBC:   Lab Results   Component Value Date    WBC 13.7 09/26/2020    RBC 3.93 09/26/2020    HGB 9.0 09/26/2020    HCT 31.5 09/26/2020    MCV 80.2 09/26/2020    MCH 22.9 09/26/2020    MCHC 28.6 09/26/2020    RDW 19.0 09/26/2020     09/26/2020    MPV 10.7 09/26/2020     CBC with Differential:    Lab Results   Component Value Date    WBC 13.7 09/26/2020    RBC 3.93 09/26/2020    HGB 9.0 09/26/2020    HCT 31.5 09/26/2020     09/26/2020    MCV 80.2 09/26/2020    MCH 22.9 09/26/2020    MCHC 28.6 09/26/2020    RDW 19.0 09/26/2020    LYMPHOPCT 15 09/26/2020    MONOPCT 6 09/26/2020    BASOPCT 0 09/26/2020    MONOSABS 0.84 09/26/2020    LYMPHSABS 2.04 09/26/2020    EOSABS <0.03 09/26/2020    BASOSABS 0.03 09/26/2020    DIFFTYPE NOT REPORTED 09/26/2020     Hemoglobin/Hematocrit:    Lab Results   Component Value Date    HGB 9.0 09/26/2020    HCT 31.5 09/26/2020     CMP:    Lab Results   Component Value Date     09/26/2020    K 3.5 09/26/2020     09/26/2020    CO2 22 09/26/2020    BUN 16 09/26/2020    CREATININE 0.74 09/26/2020    GFRAA >60 09/26/2020    LABGLOM >60 09/26/2020    GLUCOSE 84 09/26/2020    PROT 4.0 09/20/2020    LABALBU 1.4 09/20/2020    CALCIUM 7.3 09/26/2020    BILITOT <0.10 09/20/2020    ALKPHOS 149 09/20/2020    AST 12 09/20/2020    ALT 10 09/20/2020     BMP:    Lab Results   Component Value Date     09/26/2020    K 3.5 09/26/2020     09/26/2020    CO2 22 09/26/2020    BUN 16 09/26/2020    LABALBU 1.4 09/20/2020    CREATININE 0.74 09/26/2020    CALCIUM 7.3 09/26/2020    GFRAA >60 09/26/2020    LABGLOM >60 09/26/2020    GLUCOSE 84 09/26/2020     PT/INR:    Lab Results   Component Value Date    PROTIME 14.6 09/20/2020    INR 1.2 09/20/2020     PTT: Lab Results   Component Value Date    APTT 34.2 09/19/2020   [APTT}    Physical Examination:        General appearance: alert, cooperative and no distress  Mental Status: oriented to person, place and time and normal affect  Abdomen: soft, nontender, nondistended, bowel sounds present  3rd spacing  Assessment:        Primary Problem  Atrial fibrillation with RVR [facility])     Active Hospital Problems    Diagnosis Date Noted    Acute on chronic diastolic heart failure (HCC) [I50.33] 09/22/2020    Acute on chronic congestive heart failure (HCC) [I50.9]     Malignant melanoma of lower extremity, including hip (HCC) [C43.70] 09/20/2020    Dizziness [R42]     Atrial fibrillation with RVR (HCC) [I48.91] 09/19/2020    Elevated brain natriuretic peptide (BNP) level [R79.89] 09/19/2020    History of alcoholism ([address] [F10.21]     Cirrhosis of liver with ascites ([address] [K74.60, R18.8]     Chronic atrial fibrillation [I48.20]     Leukocytosis [D72.829]     Anemia [D64.9]     Thrombocytosis ([address] [D47.3]     Severe malnutrition ([address] [E43]     Sarcopenia [M62.84]     Hyponatremia [E87.1]     Sepsis ([address] [A41.9]      Past Medical History:   Diagnosis Date    [facility] of [facility]ism [facility]     Hypertension         Plan:        1. Anemia, no overt bleeding  1. Hgb stable 9.0  2. Agreeable to EGD, colonoscopy however patient is not cleaned well for procedure  3. Consider outpatient EGD, colonoscopy  4. Planning on SNF for rehabilitation. 2. Cirrhosis with ascites  1. Avoid narcotics  2. 2gm low sodium diet  3. S/p paracentesis 9/21  1. SAAG 1.2 with TP < 1.0 consistent with liver source    Explained to the patient and d/W Nursing Staff  Will F/U with you  Please call or Page for any issues or change in status  Thanks          I independently performed an evaluation on Darisu Nguyen. I have reviewed the above documentation completed by the Nurse Practitioner and agree with the documented findings and plan of care. I have personally evaluated this patient with the APRN and have completed at least one if not all key elements of the E/M (history, physical exam, and MDM). Please see my additional contributions to the HPI, physical exam, assessment, and medical decision making. Discussed with the patient regarding colonoscopy EGD, colon preparation. After discussion, patient understood and he refuses to take colon preparation and colonoscopy. Discussed with the nursing staff and he does not have signs of acute blood loss. As he is not bleeding acutely, and hemoglobin has been stable, will hold off further work-up at this time per wishes of the patient. Also patient's general status may not permit adequate colon preparation and other invasive procedures at this time.       Electronically signed by GEOFF Garay NP on 9/26/2020 at 9:50 AM

## 2020-09-26 NOTE — PROGRESS NOTES
Comprehensive Nutrition Assessment    Type and Reason for Visit:  Initial(LOS day 7)    Nutrition Recommendations/Plan:   1. Continue General diet with Nectar Thick liquids  2. Start Ensure [place] with each meal.     Nutrition Assessment:  Patient is severly malnourished, BMI 16.24. Has a history of alcoholism and cirrhosis of liver with ascites, congestive heart failure. Patient was NPO for EGD and colonscopy but unable to get done due to tachycardia with heart rate in the 130s. Had paracentesis 9/21. On Nectar thick liquids due to swallowing difficulty. Continue General Diet with Nectar Thick Liquids, start Ensure [place] with each meal, monitor PO intakes and tolerance. Malnutrition Assessment:  Malnutrition Status:  Severe malnutrition    Context:  Chronic Illness     Findings of the 6 clinical characteristics of malnutrition:  Energy Intake:  7 - 75% or less estimated energy requirements for 1 month or longer  Weight Loss:  Unable to assess     Body Fat Loss:  7 - Severe body fat loss Orbital, Triceps, Fat Overlying Ribs   Muscle Mass Loss:  7 - Severe muscle mass loss Temples (temporalis), Calf (gastrocnemius), Hand (interosseous)  Fluid Accumulation:  7 - Severe Extremities, Ascites, Generalized   Strength:  Not Performed    Estimated Daily Nutrient Needs:  Energy (kcal):  8846-7929 kcal based on 32-35 kcal/kg of admission; Weight Used for Energy Requirements:  Admission     Protein (g):  77-87 gm based on 1.5-1.7 gm/kg of admission; Weight Used for Protein Requirements:  Admission        Nutrition Related Findings:  Edema: +4 pitting RLE, +3 pitting RUE, LUE, +2 pitting generalized, +2 LLE. GI: bowel sounds active. Loss of appetite.  Varied weights 113-130 lbs since admission due to severe edema and ascites      Wounds:  Stage III, Venous Stasis       Current Nutrition Therapies:    DIET GENERAL; Mildly Thick (Nectar)    Anthropometric Measures:  · Height: 5' 10\" (177.8 cm)  · Current Body Weight: 113 lb 3 oz (51.3 kg)   · Admission Body Weight: 113 lb 1.6 oz (51.3 kg)    · Usual Body Weight:       · Ideal Body Weight: 166 lbs; % Ideal Body Weight 68.2 %   · BMI: 16.2    Nutrition Diagnosis:   · Severe malnutrition related to psychological cause or life stress, cardiac dysfunction as evidenced by BMI, wounds, weight loss, intake 0-25%, severe loss of subcutaneous fat, severe muscle loss, lab values    Nutrition Interventions:   Food and/or Nutrient Delivery:  Continue Current Diet, Start Oral Nutrition Supplement  Nutrition Education/Counseling:  Education not indicated   Coordination of Nutrition Care:  Continued Inpatient Monitoring    Goals:  PO intakes to meet % of estimated nutrition needs       Nutrition Monitoring and Evaluation:   Food/Nutrient Intake Outcomes:  Food and Nutrient Intake, Supplement Intake  Physical Signs/Symptoms Outcomes:  Biochemical Data, Chewing or Swallowing, Weight, Skin, Hemodynamic Status, Meal Time Behavior     Discharge Planning:    Continue current diet, Continue Oral Nutrition Supplement       Some areas of assessment may be incomplete due to COVID-19 precautions    Saadia Padgett RD, LD  Office phone [phone]

## 2020-09-26 NOTE — PROGRESS NOTES
Physical Therapy  [place] [facility] [place] [facility]  Daily Treatment Note  NAME: Michell Colindres  : 1945  MRN: [id number]    Date of Service: 2020    Discharge Recommendations:  Subacute/Skilled Nursing Facility        Assessment   Body structures, Functions, Activity limitations: Decreased functional mobility ; Decreased endurance;Decreased strength;Decreased balance;Decreased safe awareness;Decreased ADL status  Assessment: Pt tolerated treatment okay, however is very lethargic duirng treatment session and was only willing to do bed exercises. Rec SNF following DC d/t weakness, pt is unsafe to go home at this time  Prognosis: Fair  Decision Making: Medium Complexity  PT Education: Home Exercise Program  REQUIRES PT FOLLOW UP: Yes  Activity Tolerance  Activity Tolerance: Patient limited by fatigue     Patient Diagnosis(es): The primary encounter diagnosis was Dizziness. Diagnoses of [facility] and Acute on chronic congestive heart failure, unspecified heart failure type [facility]) were also pertinent to this visit. has a past medical history of History of alcoholism ([facility] and Hypertension. has a past surgical history that includes hernia repair and Hydrocele surgery. Restrictions  Restrictions/Precautions  Restrictions/Precautions: Fall Risk, Up as Tolerated, Contact Precautions, General Precautions  Required Braces or Orthoses?: No  Position Activity Restriction  Other position/activity restrictions: L AKA, wounds on RLE, telemetry, RUE IV  Subjective   General  Chart Reviewed: Yes  Response To Previous Treatment: Patient with no complaints from previous session. Family / Caregiver Present: No  Subjective  Subjective: Pt intiially not agreeing to completing PT, however RN stated that he needed to complete in order to be DC.   General Comment  Comments: Pt okay to be seen for PT per RN, RN urged pt to partake in PT for placement following DC  Pain Screening  Patient Currently in Pain: Denies  Vital Signs  Patient Currently in Pain: Denies       Orientation     Cognition      Objective   Bed mobility  Bridging: Unable to assess  Rolling to Left: Unable to assess  Rolling to Right: Unable to assess  Supine to Sit: Unable to assess  Sit to Supine: Unable to assess  Scooting: Unable to assess  Comment: Pt not agreeable to any bed mobility, only completed bed exercises. Transfers  Sit to Stand: Unable to assess  Stand to sit: Unable to assess  Bed to Chair: Unable to assess  Stand Pivot Transfers: Unable to assess  Squat Pivot Transfers: Unable to assess  Lateral Transfers: Unable to assess  Car Transfer: Unable to assess  Comment: Unable to complete today, pt not willing to get out of bed  Ambulation  Ambulation?: No     Balance  Posture: (Unable to assess this date)  Exercises  Comments: Completed supine bed exercises this date of: ankle pumps, heel slides, glut sets, and quad sets x 20 reps. Pt very lethargic and slow with completing exercises today. Unable to complete AAROM exercises d/t pt reporting high levels of pain to touch. G-Code     OutComes Score                                                     AM-PAC Score             Goals  Short term goals  Time Frame for Short term goals: 12 visits  Short term goal 1: Pt will roll left<>right with SBA and use of handrails  Short term goal 2: Pt will scoot self in bed with SBA in order to reposition self. Short term goal 3: Pt will tolerate 25mins of PT to improve activity tolerance, strength, and functional mobility  Short term goal 4: Pt will perform supine<>sit with modA x2  Patient Goals   Patient goals :  To get better    Plan    Plan  Times per week: 1-2x day / 5-6 days per week  Specific instructions for Next Treatment: if tolerable, assess supine<>sit  Current Treatment Recommendations: Strengthening, Functional Mobility Training, Positioning, Safety Education & Training, Endurance Training  Safety Devices  Type of devices:  All fall risk precautions in place, Bed alarm in place, Call light within reach, Left in bed, Patient at risk for falls     Therapy Time   Individual Concurrent Group Co-treatment   Time In 1141         Time Out 1155         Minutes [address], [address] [facility]

## 2020-09-26 NOTE — PROGRESS NOTES
Notified Dr. David Meek of low urine output so far this shift of only 50. Also, that bp was lower this am when attempting to administer Cardizem. Awaiting response back. Per Dr. David Meek, start patient on NS at 50 ml/hr. Order to be placed by him.

## 2020-09-26 NOTE — PLAN OF CARE
Problem: Skin Integrity:  Goal: Will show no infection signs and symptoms  Description: Will show no infection signs and symptoms  9/25/2020 2344 by Eder Riddle RN  Outcome: Ongoing  9/25/2020 1721 by Sj Gaviria RN  Outcome: Ongoing  Goal: Absence of new skin breakdown  Description: Absence of new skin breakdown  9/25/2020 2344 by Eder Riddle RN  Outcome: Ongoing  Note: Patients skin is free from any new breakdown. Will continue to monitor and turn Q2 and as needed.    9/25/2020 1721 by Sj Gaviria RN  Outcome: Ongoing     Problem: Cardiac:  Goal: Ability to maintain vital signs within normal range will improve  Description: Ability to maintain vital signs within normal range will improve  9/25/2020 2344 by Eder Riddle RN  Outcome: Ongoing  9/25/2020 1721 by Sj Gaviria RN  Outcome: Ongoing  Goal: Cardiovascular alteration will improve  Description: Cardiovascular alteration will improve  9/25/2020 2344 by Eder Riddle RN  Outcome: Ongoing  9/25/2020 1721 by Sj Gaviria RN  Outcome: Ongoing     Problem: Health Behavior:  Goal: Will modify at least one risk factor affecting health status  Description: Will modify at least one risk factor affecting health status  9/25/2020 2344 by Eder Riddle RN  Outcome: Ongoing  9/25/2020 1721 by Sj Gaviria RN  Outcome: Ongoing  Goal: Identification of resources available to assist in meeting health care needs will improve  Description: Identification of resources available to assist in meeting health care needs will improve  9/25/2020 2344 by Eder Riddle RN  Outcome: Ongoing  9/25/2020 1721 by Sj Gaviria RN  Outcome: Ongoing     Problem: Physical Regulation:  Goal: Complications related to the disease process, condition or treatment will be avoided or minimized  Description: Complications related to the disease process, condition or treatment will be avoided or minimized  9/25/2020 2344 by Josephine Felix Akash Young RN  Outcome: Ongoing  9/25/2020 1721 by Philip Cooper RN  Outcome: Ongoing     Problem: Falls - Risk of:  Goal: Will remain free from falls  Description: Will remain free from falls  9/25/2020 2344 by Natasha Gupta RN  Outcome: Ongoing  9/25/2020 1721 by Philip Cooper RN  Outcome: Ongoing  Goal: Absence of physical injury  Description: Absence of physical injury  9/25/2020 2344 by Natasha Gupta RN  Outcome: Ongoing  9/25/2020 1721 by Philip Cooper RN  Outcome: Ongoing     Problem: Pain:  Goal: Pain level will decrease  Description: Pain level will decrease  9/25/2020 2344 by Natasha Gupta RN  Outcome: Ongoing  Note: Patient stated that he is free from pain at this time.   9/25/2020 1721 by Philip Cooper RN  Outcome: Ongoing  Goal: Control of acute pain  Description: Control of acute pain  9/25/2020 2344 by Natasha Gupta RN  Outcome: Ongoing  9/25/2020 1721 by Philip Cooper RN  Outcome: Ongoing  Goal: Control of chronic pain  Description: Control of chronic pain  9/25/2020 2344 by Natasha Gupta RN  Outcome: Ongoing  9/25/2020 1721 by Philip Cooper RN  Outcome: Ongoing

## 2020-09-26 NOTE — PLAN OF CARE
Nutrition Problem #1: Severe malnutrition  Intervention: Food and/or Nutrient Delivery: Continue Current Diet, Start Oral Nutrition Supplement  Nutritional Goals: PO intakes to meet % of estimated nutrition needs

## 2020-09-26 NOTE — PROGRESS NOTES
Patient unable to tolerate procedure per Dr. Daly Rodrigez (EGD/Colonoscopy). To do outpatient. Notified Dr. Mono Muhammad of this. Ok to discharge per his standpoint. Called [employer] from [address] concerning pre cert. [facility] to call [facility] for further information and need for possible peer to peer review. Notified Dr. Mono Muhammad of this.

## 2020-09-27 LAB
ABSOLUTE EOS #: <0.03 K/UL (ref 0–0.44)
ABSOLUTE IMMATURE GRANULOCYTE: 0.21 K/UL (ref 0–0.3)
ABSOLUTE LYMPH #: 2.36 K/UL (ref 1.1–3.7)
ABSOLUTE MONO #: 0.79 K/UL (ref 0.1–1.2)
ANION GAP SERPL CALCULATED.3IONS-SCNC: 7 MMOL/L (ref 9–17)
BASOPHILS # BLD: 0 % (ref 0–2)
BASOPHILS ABSOLUTE: <0.03 K/UL (ref 0–0.2)
BUN BLDV-MCNC: 19 MG/DL (ref 8–23)
BUN/CREAT BLD: 23 (ref 9–20)
CALCIUM SERPL-MCNC: 7 MG/DL (ref 8.6–10.4)
CHLORIDE BLD-SCNC: 108 MMOL/L (ref 98–107)
CO2: 23 MMOL/L (ref 20–31)
CREAT SERPL-MCNC: 0.82 MG/DL (ref 0.7–1.2)
CULTURE: ABNORMAL
DIFFERENTIAL TYPE: ABNORMAL
DIRECT EXAM: ABNORMAL
DIRECT EXAM: ABNORMAL
EOSINOPHILS RELATIVE PERCENT: 0 % (ref 1–4)
GFR AFRICAN AMERICAN: >60 ML/MIN
GFR NON-AFRICAN AMERICAN: >60 ML/MIN
GFR SERPL CREATININE-BSD FRML MDRD: ABNORMAL ML/MIN/{1.73_M2}
GFR SERPL CREATININE-BSD FRML MDRD: ABNORMAL ML/MIN/{1.73_M2}
GLUCOSE BLD-MCNC: 97 MG/DL (ref 70–99)
HCT VFR BLD CALC: 28.6 % (ref 40.7–50.3)
HEMOGLOBIN: 8.2 G/DL (ref 13–17)
IMMATURE GRANULOCYTES: 2 %
LYMPHOCYTES # BLD: 21 % (ref 24–43)
Lab: ABNORMAL
MCH RBC QN AUTO: 23.4 PG (ref 25.2–33.5)
MCHC RBC AUTO-ENTMCNC: 28.7 G/DL (ref 28.4–34.8)
MCV RBC AUTO: 81.5 FL (ref 82.6–102.9)
MONOCYTES # BLD: 7 % (ref 3–12)
NRBC AUTOMATED: 0 PER 100 WBC
PDW BLD-RTO: 19.3 % (ref 11.8–14.4)
PLATELET # BLD: 290 K/UL (ref 138–453)
PLATELET ESTIMATE: ABNORMAL
PMV BLD AUTO: 10.4 FL (ref 8.1–13.5)
POTASSIUM SERPL-SCNC: 3.7 MMOL/L (ref 3.7–5.3)
RBC # BLD: 3.51 M/UL (ref 4.21–5.77)
RBC # BLD: ABNORMAL 10*6/UL
SEG NEUTROPHILS: 70 % (ref 36–65)
SEGMENTED NEUTROPHILS ABSOLUTE COUNT: 7.71 K/UL (ref 1.5–8.1)
SODIUM BLD-SCNC: 138 MMOL/L (ref 135–144)
SPECIMEN DESCRIPTION: ABNORMAL
WBC # BLD: 11.1 K/UL (ref 3.5–11.3)
WBC # BLD: ABNORMAL 10*3/UL

## 2020-09-27 PROCEDURE — 99232 SBSQ HOSP IP/OBS MODERATE 35: CPT | Performed by: INTERNAL MEDICINE

## 2020-09-27 PROCEDURE — 2580000003 HC RX 258: Performed by: FAMILY MEDICINE

## 2020-09-27 PROCEDURE — 36415 COLL VENOUS BLD VENIPUNCTURE: CPT

## 2020-09-27 PROCEDURE — 6370000000 HC RX 637 (ALT 250 FOR IP): Performed by: FAMILY MEDICINE

## 2020-09-27 PROCEDURE — 2060000000 HC ICU INTERMEDIATE R&B

## 2020-09-27 PROCEDURE — 85025 COMPLETE CBC W/AUTO DIFF WBC: CPT

## 2020-09-27 PROCEDURE — 80048 BASIC METABOLIC PNL TOTAL CA: CPT

## 2020-09-27 PROCEDURE — 6370000000 HC RX 637 (ALT 250 FOR IP): Performed by: NURSE PRACTITIONER

## 2020-09-27 PROCEDURE — 6370000000 HC RX 637 (ALT 250 FOR IP): Performed by: INTERNAL MEDICINE

## 2020-09-27 RX ADMIN — DIGOXIN 125 MCG: 125 TABLET ORAL at 08:36

## 2020-09-27 RX ADMIN — MIDODRINE HYDROCHLORIDE 10 MG: 10 TABLET ORAL at 08:36

## 2020-09-27 RX ADMIN — GABAPENTIN 300 MG: 300 CAPSULE ORAL at 20:56

## 2020-09-27 RX ADMIN — MIRTAZAPINE 15 MG: 15 TABLET, FILM COATED ORAL at 20:56

## 2020-09-27 RX ADMIN — FERROUS SULFATE TAB EC 325 MG (65 MG FE EQUIVALENT) 325 MG: 325 (65 FE) TABLET DELAYED RESPONSE at 13:16

## 2020-09-27 RX ADMIN — GABAPENTIN 300 MG: 300 CAPSULE ORAL at 15:43

## 2020-09-27 RX ADMIN — DILTIAZEM HYDROCHLORIDE 30 MG: 30 TABLET, FILM COATED ORAL at 03:45

## 2020-09-27 RX ADMIN — GABAPENTIN 300 MG: 300 CAPSULE ORAL at 08:36

## 2020-09-27 RX ADMIN — PANTOPRAZOLE SODIUM 40 MG: 40 TABLET, DELAYED RELEASE ORAL at 05:39

## 2020-09-27 RX ADMIN — FUROSEMIDE 20 MG: 20 TABLET ORAL at 08:36

## 2020-09-27 RX ADMIN — METOPROLOL TARTRATE 12.5 MG: 25 TABLET, FILM COATED ORAL at 20:56

## 2020-09-27 RX ADMIN — FERROUS SULFATE TAB EC 325 MG (65 MG FE EQUIVALENT) 325 MG: 325 (65 FE) TABLET DELAYED RESPONSE at 08:36

## 2020-09-27 RX ADMIN — DILTIAZEM HYDROCHLORIDE 30 MG: 30 TABLET, FILM COATED ORAL at 20:56

## 2020-09-27 RX ADMIN — MAGNESIUM GLUCONATE 500 MG ORAL TABLET 400 MG: 500 TABLET ORAL at 08:36

## 2020-09-27 RX ADMIN — ESCITALOPRAM OXALATE 20 MG: 10 TABLET ORAL at 08:36

## 2020-09-27 RX ADMIN — DILTIAZEM HYDROCHLORIDE 30 MG: 30 TABLET, FILM COATED ORAL at 15:42

## 2020-09-27 RX ADMIN — APIXABAN 5 MG: 5 TABLET, FILM COATED ORAL at 20:56

## 2020-09-27 RX ADMIN — MIDODRINE HYDROCHLORIDE 10 MG: 10 TABLET ORAL at 20:56

## 2020-09-27 RX ADMIN — FERROUS SULFATE TAB EC 325 MG (65 MG FE EQUIVALENT) 325 MG: 325 (65 FE) TABLET DELAYED RESPONSE at 18:10

## 2020-09-27 RX ADMIN — DILTIAZEM HYDROCHLORIDE 30 MG: 30 TABLET, FILM COATED ORAL at 08:36

## 2020-09-27 RX ADMIN — Medication 10 ML: at 08:43

## 2020-09-27 RX ADMIN — METOPROLOL TARTRATE 12.5 MG: 25 TABLET, FILM COATED ORAL at 08:37

## 2020-09-27 RX ADMIN — MIDODRINE HYDROCHLORIDE 10 MG: 10 TABLET ORAL at 15:43

## 2020-09-27 RX ADMIN — PANTOPRAZOLE SODIUM 40 MG: 40 TABLET, DELAYED RELEASE ORAL at 15:43

## 2020-09-27 RX ADMIN — Medication 10 ML: at 20:56

## 2020-09-27 ASSESSMENT — ENCOUNTER SYMPTOMS
CHEST TIGHTNESS: 0
WHEEZING: 0
SHORTNESS OF BREATH: 0
DIARRHEA: 1
NAUSEA: 0
SINUS PRESSURE: 0
ABDOMINAL PAIN: 0
VOMITING: 0
COLOR CHANGE: 0
CONSTIPATION: 0
COUGH: 0
SINUS PAIN: 0
BLOOD IN STOOL: 0

## 2020-09-27 ASSESSMENT — PAIN SCALES - GENERAL
PAINLEVEL_OUTOF10: 0

## 2020-09-27 NOTE — PLAN OF CARE
Problem: Skin Integrity:  Goal: Will show no infection signs and symptoms  Description: Will show no infection signs and symptoms  Outcome: Ongoing     Problem: Skin Integrity:  Goal: Absence of new skin breakdown  Description: Absence of new skin breakdown  Outcome: Ongoing  Note: Waffle mattress on bed and properly inflated   Q2 turn  Meiplex to coccyx       Problem: Cardiac:  Goal: Ability to maintain vital signs within normal range will improve  Description: Ability to maintain vital signs within normal range will improve  Outcome: Ongoing     Problem: Cardiac:  Goal: Cardiovascular alteration will improve  Description: Cardiovascular alteration will improve  Outcome: Ongoing     Problem: Falls - Risk of:  Goal: Will remain free from falls  Description: Will remain free from falls  Outcome: Ongoing  Note: Siderails up x 2  Hourly rounding. Call light in reach. Instructed to call for assist before attempting out of bed. Remains free from falls and accidental injury at this time. Floor free from obstacles, and bed is locked and in lowest position. Adequate lighting provided. Bed alarm on. Fall sticker on wristband.  Fall Sign posted in doorway

## 2020-09-27 NOTE — PROGRESS NOTES
[facility]  Office: [address], DO, Berry Oakley, DO, Yuliyapio Love, DO, Pat Wilson, DO, Roxanne Zacarias MD, Cristiane Pollock MD, Duran Ba MD, Brett Oneal MD, Enzo Aponte MD, El Roberts MD, Verenice Mullins MD, Melanie Pollard MD, Cal Salinas MD, Micaela Alfaro DO, Dashawn Crocker MD, Brooklynn Fields MD, All Chamberlain DO, Kaylene Fajardo MD,  Elen Olmstead DO, Chandan Munoz MD, Troy Lorenzo MD, Seth Silva, [facility], [facility], CNP, Rich Campbell, CNP, Raissa Lorenzo, CNS, Stanford Hernadez, CNP, Jazmin Zhou, CNP, Leland Mann, CNP, Yasmin Mabry, [facility], Reina Jarrell CNP, Wanda Avitia PA-C, Patt Amanda [facility], Raquel Adams, CNP, Piedad Kenny CNP, Cirilo Pitts, CNP, Namrata Guan, [facility], [facility], [facility]    Progress Note    9/27/2020    4:29 PM    Name:   Sherine Holman  MRN:     [id number]     Acct:      [de-identified]   Room:   [address] [place] [address] Day:  8  Admit Date:  9/19/2020 11:24 AM    PCP:   Laureen Buerger, APRN - CNP  Code Status:  Full Code    Subjective:     C/C:   Chief Complaint   Patient presents with    Dizziness    Palpitations     Interval History Status: not changed. Seen and examined face-to-face,  No events overnight,  Discussed with the gastroenterology,  Nursing home placement pending    Brief History:     9/19 -very poor historian. Known history of A. fib. Known history of alcoholism. Known history of cancer, likely malignant melanoma. Admitted from the nursing home with near syncope and palpitations. Patient is found to have A. fib with RVR and hypotension patient was treated according to ACLS protocols and cardiology was consulted    9/20 -patient is found to have decreased liver function, ascites, anemia requiring transfusion, elevated white count, and a single positive blood culture.   Infectious disease, cardiology, gastroenterology are requested. 9/21 -patient condition has not changed. Patient heart rate and blood pressure is better controlled today. Hemoglobin continues to trend down. Infectious disease continues to follow and is narrowing antimicrobial therapy. We await paracentesis results. 9/22 -transition to oral medications. Await GI recommendations on anemia work-up. Paracentesis results still pending. 9/23 -GI apparently plans scopes for tomorrow. Paracentesis is negative for malignancy, culture results pending. 9/24 -patient condition essentially stable. Cardiology and infectious disease have de-escalate therapy. Anticipate discharge to skilled nursing facility for continued rehabilitation. 9/25 -we await GI intervention versus decision to follow as an outpatient. Patient is agreeable to SNF. Precertification and placement decisions are pending. September 26,  EGD and colonoscopy canceled at this time due to poor prep and patient noncompliant with GoLYTELY,  Will be done as outpatient,  Given a dose of Lasix last evening, blood pressure has been running low since then,  Started on IV Fortaz his urine output has dropped to 50 in 4 hours,  We will monitor today possible discharge tomorrow after PT. September 27,  EGD colonoscopy has been canceled, patient not cooperating well,  Hemoglobin stable,  Severe malnutrition, patient does not eat much,  Cirrhosis and severe ascites status post paracentesis, cultures negative so far,  A. fib, rate controlled at this time,  Continue to monitor,  Nursing home placement pending    Review of Systems:     Review of Systems   Constitutional: Positive for activity change, appetite change and fatigue. Negative for chills, diaphoresis and fever. HENT: Negative for congestion, sinus pressure and sinus pain. Eyes: Negative for visual disturbance. Respiratory: Negative for cough, chest tightness, shortness of breath and wheezing.     Cardiovascular: Positive for leg swelling. Negative for chest pain and palpitations. Gastrointestinal: Positive for diarrhea. Negative for abdominal pain, blood in stool, constipation, nausea and vomiting. Endocrine: Negative for cold intolerance and heat intolerance. Genitourinary: Negative for difficulty urinating, flank pain and frequency. Musculoskeletal: Positive for arthralgias. Skin: Positive for pallor. Negative for color change. Neurological: Positive for weakness and light-headedness. Negative for dizziness, numbness and headaches. Psychiatric/Behavioral: Negative for agitation and confusion. All other systems reviewed and are negative. Medications: Allergies:  No Known Allergies    Current Meds:   Scheduled Meds:    furosemide  20 mg Oral Daily    polyethylene glycol  2,000 mL Oral Once    midodrine  10 mg Oral TID    dilTIAZem  30 mg Oral 4 times per day    ferrous sulfate  325 mg Oral TID WC    escitalopram  20 mg Oral Daily    mirtazapine  15 mg Oral Nightly    magnesium oxide  400 mg Oral Daily    gabapentin  300 mg Oral TID    [START ON 10/5/2020] vitamin D  50,000 Units Oral Q30 Days    pantoprazole  40 mg Oral BID AC    sodium chloride flush  10 mL Intravenous 2 times per day    digoxin  125 mcg Oral Daily    metoprolol tartrate  12.5 mg Oral BID    [Held by provider] apixaban  5 mg Oral BID     Continuous Infusions:    sodium chloride 50 mL/hr at 09/26/20 1343     PRN Meds: oxyCODONE **OR** [DISCONTINUED] oxyCODONE, potassium chloride **OR** potassium alternative oral replacement **OR** potassium chloride, sodium chloride flush, sodium chloride flush    Data:     Past Medical History:   has a past medical history of History of alcoholism ([address] and Hypertension. Social History:   reports that he has been smoking. He has been smoking about 0.50 packs per day. He has never used smokeless tobacco. He reports current alcohol use. He reports that he does not use drugs.      Family History: History reviewed. No pertinent family history. Vitals:  BP (!) 100/56   Pulse 97   Temp 97.5 °F (36.4 °C) (Oral)   Resp 16   Ht 5' 10\" (1.778 m)   Wt 113 lb 3 oz (51.3 kg)   SpO2 100%   BMI 16.24 kg/m²   Temp (24hrs), Av °F (36.7 °C), Min:97.5 °F (36.4 °C), Max:98.6 °F (37 °C)    No results for input(s): POCGLU in the last 72 hours. I/O (24Hr): Intake/Output Summary (Last 24 hours) at 2020 1629  Last data filed at 2020 0448  Gross per 24 hour   Intake 901.5 ml   Output 100 ml   Net 801.5 ml       Labs:  Hematology:  Recent Labs     20  0526   WBC 9.4 13.7* 11.1   RBC 3.61* 3.93* 3.51*   HGB 8.2* 9.0* 8.2*   HCT 28.3* 31.5* 28.6*   MCV 78.4* 80.2* 81.5*   MCH 22.7* 22.9* 23.4*   MCHC 29.0 28.6 28.7   RDW 18.9* 19.0* 19.3*    344 290   MPV 10.0 10.7 10.4     Chemistry:  Recent Labs     20  0526    137 138   K 4.1 3.5* 3.7   * 105 108*   CO2 20 22 23   GLUCOSE 87 84 97   BUN 16 16 19   CREATININE 0.51* 0.74 0.82   ANIONGAP 8* 10 7*   LABGLOM >60 >60 >60   GFRAA >60 >60 >60   CALCIUM 7.0* 7.3* 7.0*     No results for input(s): PROT, LABALBU, LABA1C, L8ZKXJL, P7PZIAW, FT4, TSH, AST, ALT, LDH, GGT, ALKPHOS, LABGGT, BILITOT, BILIDIR, AMMONIA, AMYLASE, LIPASE, LACTATE, CHOL, HDL, LDLCHOLESTEROL, CHOLHDLRATIO, TRIG, VLDL, BQO84AT, PHENYTOIN, PHENYF, URICACID, POCGLU in the last 72 hours. ABG:No results found for: POCPH, PHART, PH, POCPCO2, FGA0QFR, PCO2, POCPO2, PO2ART, PO2, POCHCO3, ZFX8JJP, HCO3, NBEA, PBEA, BEART, BE, THGBART, THB, KTG9OXP, HEHD0ODV, K1DUEICY, O2SAT, FIO2  Lab Results   Component Value Date/Time    SPECIAL NOT REPORTED 2020 12:00 PM     Lab Results   Component Value Date/Time    CULTURE NO GROWTH 6 DAYS 2020 12:00 PM       Radiology:  Ct Abdomen Pelvis W Iv Contrast    Result Date: 2020  1.   Moderate right and small left pleural effusions with compressive atelectasis at the lung bases in this patient with emphysematous changes. 2.  Prominent gastric fold thickening most compatible with gastritis. 3.  Liver appears slightly lobular. Additionally, abdominal ascites is noted. The constellation of findings are consistent with cirrhosis. 4.  Enlarged prostate. Bladder wall appears thickened although bladder is suboptimally distended. 5.  Atherosclerotic disease. 6.  No bowel obstruction or urinary obstruction although gallstones are present. No evidence of appendicitis. Xr Chest Portable    Result Date: 9/19/2020  1. Right lower lobe airspace opacity potentially due to atelectasis, pneumonia, or aspiration. 2. Bony demineralization partially limits evaluation for fractures. Ct Chest Pulmonary Embolism W Contrast    Result Date: 9/19/2020  1. No evidence of pulmonary embolism. 2.  Emphysematous changes. 3. small to moderate right pleural effusion with trace left effusion and basilar atelectasis. 4.  Mild ascites upper abdomen. Physical Examination:        Physical Exam  Vitals signs and nursing note reviewed. Constitutional:       General: He is not in acute distress. Appearance: He is well-developed. He is not diaphoretic. Comments: Underweight and malnourished, poor personal hygiene   HENT:      Head: Normocephalic and atraumatic. Right Ear: Hearing normal.      Left Ear: Hearing normal.      Nose: Nose normal. No rhinorrhea. Eyes:      General: Lids are normal.      Extraocular Movements:      Right eye: Normal extraocular motion. Left eye: Normal extraocular motion. Conjunctiva/sclera: Conjunctivae normal.      Right eye: Right conjunctiva is not injected. Left eye: Left conjunctiva is not injected. Pupils: Pupils are equal, round, and reactive to light. Pupils are equal.      Right eye: Pupil is reactive. Left eye: Pupil is reactive. Neck:      Musculoskeletal: Neck supple. Thyroid: No thyromegaly. Vascular: No carotid bruit. Trachea: Trachea and phonation normal. No tracheal deviation. Cardiovascular:      Rate and Rhythm: Normal rate. Rhythm irregular. Pulses: Normal pulses. Heart sounds: Normal heart sounds. No murmur. Pulmonary:      Effort: Pulmonary effort is normal. No respiratory distress. Breath sounds: No stridor. Examination of the right-lower field reveals decreased breath sounds. Decreased breath sounds present. Abdominal:      General: Bowel sounds are normal. There is no distension. Palpations: Abdomen is soft. There is no mass. Tenderness: There is no abdominal tenderness. There is no guarding. Musculoskeletal:         General: No tenderness. Right lower leg: Edema present. Comments: Left above-knee amputation   Skin:     General: Skin is warm and dry. Findings: No erythema, lesion or rash.         Assessment:        Hospital Problems           Last Modified POA    * (Principal) Atrial fibrillation with RVR ([address] 9/19/2020 Yes    Elevated brain natriuretic peptide (BNP) level 9/19/2020 Yes    History of alcoholism ([address] 9/19/2020 Yes    Cirrhosis of liver with ascites ([address] 9/19/2020 Yes    Chronic atrial fibrillation 9/19/2020 Yes    Leukocytosis 9/19/2020 Yes    Anemia 9/21/2020 Yes    Thrombocytosis ([address] 9/19/2020 Yes    Severe malnutrition ([address] (Chronic) 9/26/2020 Yes    Sarcopenia 9/19/2020 Yes    Hyponatremia 9/19/2020 Yes    Sepsis ([address] 9/20/2020 Yes    Malignant melanoma of lower extremity, including hip ([address] 9/20/2020 Yes    Overview Signed 9/20/2020  3:34 PM by GEOFF Chou CNP     Hx of left foot with left groin lymph node positive for melanoma         Dizziness 9/20/2020 Yes    Acute on chronic diastolic heart failure ([address] 9/22/2020 Yes    Acute on chronic congestive heart failure ([address] 9/22/2020 Yes          Plan:          · Hypotension, resolved ,  · low Urine output, better  · Stop antibiotic therapy per

## 2020-09-28 LAB
ABSOLUTE EOS #: 0 K/UL (ref 0–0.44)
ABSOLUTE IMMATURE GRANULOCYTE: 0.21 K/UL (ref 0–0.3)
ABSOLUTE LYMPH #: 1.49 K/UL (ref 1.1–3.7)
ABSOLUTE MONO #: 0.85 K/UL (ref 0.1–1.2)
ANION GAP SERPL CALCULATED.3IONS-SCNC: 12 MMOL/L (ref 9–17)
BASOPHILS # BLD: 0 % (ref 0–2)
BASOPHILS ABSOLUTE: 0 K/UL (ref 0–0.2)
BUN BLDV-MCNC: 23 MG/DL (ref 8–23)
BUN/CREAT BLD: 22 (ref 9–20)
CALCIUM SERPL-MCNC: 7.3 MG/DL (ref 8.6–10.4)
CHLORIDE BLD-SCNC: 107 MMOL/L (ref 98–107)
CO2: 17 MMOL/L (ref 20–31)
CREAT SERPL-MCNC: 1.05 MG/DL (ref 0.7–1.2)
DIFFERENTIAL TYPE: ABNORMAL
EOSINOPHILS RELATIVE PERCENT: 0 % (ref 1–4)
GFR AFRICAN AMERICAN: >60 ML/MIN
GFR NON-AFRICAN AMERICAN: >60 ML/MIN
GFR SERPL CREATININE-BSD FRML MDRD: ABNORMAL ML/MIN/{1.73_M2}
GFR SERPL CREATININE-BSD FRML MDRD: ABNORMAL ML/MIN/{1.73_M2}
GLUCOSE BLD-MCNC: 96 MG/DL (ref 70–99)
HCT VFR BLD CALC: 30.9 % (ref 40.7–50.3)
HEMOGLOBIN: 8.7 G/DL (ref 13–17)
IMMATURE GRANULOCYTES: 1 %
LACTIC ACID: 3.3 MMOL/L (ref 0.5–2.2)
LYMPHOCYTES # BLD: 7 % (ref 24–43)
MCH RBC QN AUTO: 23.6 PG (ref 25.2–33.5)
MCHC RBC AUTO-ENTMCNC: 28.2 G/DL (ref 28.4–34.8)
MCV RBC AUTO: 83.7 FL (ref 82.6–102.9)
MONOCYTES # BLD: 4 % (ref 3–12)
MORPHOLOGY: ABNORMAL
NRBC AUTOMATED: 0.1 PER 100 WBC
PDW BLD-RTO: 19.6 % (ref 11.8–14.4)
PLATELET # BLD: 274 K/UL (ref 138–453)
PLATELET ESTIMATE: ABNORMAL
PMV BLD AUTO: 10.9 FL (ref 8.1–13.5)
POTASSIUM SERPL-SCNC: 3.7 MMOL/L (ref 3.7–5.3)
PROCALCITONIN: 2.16 NG/ML
RBC # BLD: 3.69 M/UL (ref 4.21–5.77)
RBC # BLD: ABNORMAL 10*6/UL
SEG NEUTROPHILS: 88 % (ref 36–65)
SEGMENTED NEUTROPHILS ABSOLUTE COUNT: 18.75 K/UL (ref 1.5–8.1)
SODIUM BLD-SCNC: 136 MMOL/L (ref 135–144)
WBC # BLD: 21.3 K/UL (ref 3.5–11.3)
WBC # BLD: ABNORMAL 10*3/UL

## 2020-09-28 PROCEDURE — 6370000000 HC RX 637 (ALT 250 FOR IP): Performed by: FAMILY MEDICINE

## 2020-09-28 PROCEDURE — 83605 ASSAY OF LACTIC ACID: CPT

## 2020-09-28 PROCEDURE — 51702 INSERT TEMP BLADDER CATH: CPT

## 2020-09-28 PROCEDURE — 6370000000 HC RX 637 (ALT 250 FOR IP): Performed by: NURSE PRACTITIONER

## 2020-09-28 PROCEDURE — 80048 BASIC METABOLIC PNL TOTAL CA: CPT

## 2020-09-28 PROCEDURE — 2060000000 HC ICU INTERMEDIATE R&B

## 2020-09-28 PROCEDURE — 6370000000 HC RX 637 (ALT 250 FOR IP): Performed by: INTERNAL MEDICINE

## 2020-09-28 PROCEDURE — APPSS45 APP SPLIT SHARED TIME 31-45 MINUTES: Performed by: NURSE PRACTITIONER

## 2020-09-28 PROCEDURE — 97535 SELF CARE MNGMENT TRAINING: CPT

## 2020-09-28 PROCEDURE — 2580000003 HC RX 258: Performed by: FAMILY MEDICINE

## 2020-09-28 PROCEDURE — 84145 PROCALCITONIN (PCT): CPT

## 2020-09-28 PROCEDURE — 97530 THERAPEUTIC ACTIVITIES: CPT

## 2020-09-28 PROCEDURE — 99232 SBSQ HOSP IP/OBS MODERATE 35: CPT | Performed by: INTERNAL MEDICINE

## 2020-09-28 PROCEDURE — 2580000003 HC RX 258: Performed by: INTERNAL MEDICINE

## 2020-09-28 PROCEDURE — 85025 COMPLETE CBC W/AUTO DIFF WBC: CPT

## 2020-09-28 PROCEDURE — 36415 COLL VENOUS BLD VENIPUNCTURE: CPT

## 2020-09-28 RX ADMIN — DILTIAZEM HYDROCHLORIDE 30 MG: 30 TABLET, FILM COATED ORAL at 21:06

## 2020-09-28 RX ADMIN — MIDODRINE HYDROCHLORIDE 10 MG: 10 TABLET ORAL at 15:07

## 2020-09-28 RX ADMIN — APIXABAN 5 MG: 5 TABLET, FILM COATED ORAL at 10:08

## 2020-09-28 RX ADMIN — MAGNESIUM GLUCONATE 500 MG ORAL TABLET 400 MG: 500 TABLET ORAL at 10:07

## 2020-09-28 RX ADMIN — GABAPENTIN 300 MG: 300 CAPSULE ORAL at 21:06

## 2020-09-28 RX ADMIN — FERROUS SULFATE TAB EC 325 MG (65 MG FE EQUIVALENT) 325 MG: 325 (65 FE) TABLET DELAYED RESPONSE at 13:08

## 2020-09-28 RX ADMIN — METOPROLOL TARTRATE 12.5 MG: 25 TABLET, FILM COATED ORAL at 10:10

## 2020-09-28 RX ADMIN — MIDODRINE HYDROCHLORIDE 10 MG: 10 TABLET ORAL at 21:06

## 2020-09-28 RX ADMIN — PANTOPRAZOLE SODIUM 40 MG: 40 TABLET, DELAYED RELEASE ORAL at 15:08

## 2020-09-28 RX ADMIN — MIDODRINE HYDROCHLORIDE 10 MG: 10 TABLET ORAL at 10:08

## 2020-09-28 RX ADMIN — DILTIAZEM HYDROCHLORIDE 30 MG: 30 TABLET, FILM COATED ORAL at 10:07

## 2020-09-28 RX ADMIN — SODIUM CHLORIDE: 9 INJECTION, SOLUTION INTRAVENOUS at 05:25

## 2020-09-28 RX ADMIN — PANTOPRAZOLE SODIUM 40 MG: 40 TABLET, DELAYED RELEASE ORAL at 05:16

## 2020-09-28 RX ADMIN — MIRTAZAPINE 15 MG: 15 TABLET, FILM COATED ORAL at 21:06

## 2020-09-28 RX ADMIN — DILTIAZEM HYDROCHLORIDE 30 MG: 30 TABLET, FILM COATED ORAL at 15:07

## 2020-09-28 RX ADMIN — GABAPENTIN 300 MG: 300 CAPSULE ORAL at 10:09

## 2020-09-28 RX ADMIN — Medication 10 ML: at 21:13

## 2020-09-28 RX ADMIN — FERROUS SULFATE TAB EC 325 MG (65 MG FE EQUIVALENT) 325 MG: 325 (65 FE) TABLET DELAYED RESPONSE at 18:21

## 2020-09-28 RX ADMIN — APIXABAN 5 MG: 5 TABLET, FILM COATED ORAL at 21:06

## 2020-09-28 RX ADMIN — DIGOXIN 125 MCG: 125 TABLET ORAL at 10:09

## 2020-09-28 RX ADMIN — GABAPENTIN 300 MG: 300 CAPSULE ORAL at 15:07

## 2020-09-28 RX ADMIN — FUROSEMIDE 20 MG: 20 TABLET ORAL at 10:08

## 2020-09-28 RX ADMIN — ESCITALOPRAM OXALATE 20 MG: 10 TABLET ORAL at 10:09

## 2020-09-28 RX ADMIN — DILTIAZEM HYDROCHLORIDE 30 MG: 30 TABLET, FILM COATED ORAL at 02:57

## 2020-09-28 RX ADMIN — FERROUS SULFATE TAB EC 325 MG (65 MG FE EQUIVALENT) 325 MG: 325 (65 FE) TABLET DELAYED RESPONSE at 10:08

## 2020-09-28 ASSESSMENT — ENCOUNTER SYMPTOMS
COLOR CHANGE: 0
DIARRHEA: 1
CONSTIPATION: 0
ABDOMINAL PAIN: 0
SINUS PRESSURE: 0
VOMITING: 0
CHEST TIGHTNESS: 0
WHEEZING: 0
SHORTNESS OF BREATH: 0
BLOOD IN STOOL: 0
COUGH: 0
SINUS PAIN: 0
NAUSEA: 0

## 2020-09-28 ASSESSMENT — PAIN SCALES - GENERAL
PAINLEVEL_OUTOF10: 0

## 2020-09-28 NOTE — PROGRESS NOTES
.. PALLIATIVE CARE NURSING ASSESSMENT    Patient: Daniela Benedict  Room: 1003/1003-02    Reason For Consult   Goals of care evaluation  Distress management  Guidance and support  Facilitate communications  Assistance in coordinating care      Impression: Daniela Benedict is a 77y.o. year old male  has a past medical history of History of alcoholism ([address] and Hypertension. .  Currently hospitalized for the management of atrial fib with RVR. The Palliative Care Team is following to assist with goals of care. Vital Signs  Blood pressure (!) 94/56, pulse 87, temperature 97.7 °F (36.5 °C), temperature source Axillary, resp. rate 18, height 5' 10\" (1.778 m), weight 114 lb (51.7 kg), SpO2 95 %. Patient Active Problem List   Diagnosis    Atrial fibrillation with RVR (HCC)    Elevated brain natriuretic peptide (BNP) level    History of alcoholism (HCC)    Other ascites    Cirrhosis of liver with ascites (HCC)    Chronic atrial fibrillation    Leukocytosis    Anemia    Thrombocytosis (HCC)    Severe malnutrition (HCC)    Sarcopenia    Hyponatremia    Sepsis (HCC)    Malignant melanoma of lower extremity, including hip (HCC)    Dizziness    Acute on chronic diastolic heart failure (HCC)    Acute on chronic congestive heart failure (HCC)       Palliative Interaction: Pt awake, alert and oriented upon my arrival. He has a flat affect and yells at staff at times. I introduced myself and the role of palliative care. Pt denies pain at present. Bedside nurse came into the room and patient states he had a bowel movement. Pt had large amount of mushy-liquid orange colored stool. When cleaning patient up, patient has sores on his penis and excoriation on his peritoneum. Pt yells out when getting cleaned up. Pt agreeable to a fecal mgt system. GOALS OF CARE: Plan is for patient to go to long term care possibly at [facility].  Pt has been denied for skilled care at [facility] (due to patient refusing therapy) and will possibly be going under long term care. Pt's son Ariadne Albert would like to be involved in palliative conversations so I called Ariadne Albert but he did not answer. I did leave a message with my call-back information to arrange a meeting for tomorrow. Shayy Harper NP states he could possibly be available for the meeting if needed. Will wait for son to call back. Would explore the option of adding on hospice care to his long term care. Will also discuss code status. Our team will follow up tomorrow for possible family meeting. Goals/Plan of care  Education/support to family  Education/support to patient  Discharge planning/helping to coordinate care  Communications with primary service  Providing support for coping/adaptation/distress of family  Providing support for coping/adaptation/distress of patient  Discussing meaning/purpose   Continue with current plan of care  Clarification of medical condition to patient and family  Code status clarified: Full Code  Validating patient/family distress  Continued communication updates  left message for son Ariadne Albert to arrange a family meeting for tomorrow. Pt may benefit from hospice care at the long term facility. Can also discuss code status. Will wait for return call from son.       Palliative Care Coordinator  [facility] DORA Young, FABRIZIO UNDERWOOD [facility] Office: [address] [place] Office: [phone]    For [facility] scheduling please call [phone]

## 2020-09-28 NOTE — PLAN OF CARE
Problem: Skin Integrity:  Goal: Absence of new skin breakdown  Description: Absence of new skin breakdown  9/28/2020 1139 by Helen Vargas RN  Outcome: Ongoing  Note: Waffle mattress on bed and properly inflated   Q2 turn  Meiplex not applied to coccyx due to frequency of stooling. Zinc oxide cream applied liberally after each brief change. Will continue to change frequently.   9/28/2020 0210 by Jose Armando Cheema RN  Outcome: Ongoing  9/28/2020 0208 by Jose Armando Cheema RN  Outcome: Ongoing     Problem: Cardiac:  Goal: Cardiovascular alteration will improve  Description: Cardiovascular alteration will improve  Outcome: Ongoing     Problem: Health Behavior:  Goal: Will modify at least one risk factor affecting health status  Description: Will modify at least one risk factor affecting health status  Outcome: Ongoing

## 2020-09-28 NOTE — PROGRESS NOTES
Occupational Therapy  DATE: 2020    NAME: Brenda Whiting  MRN: [id number]   : 1945  [facility]  Occupational Therapy Not Seen Note    Patient not available for Occupational Therapy due to:    [] Testing:    [] Hemodialysis    [] Cancelled by RN:    [x]Refusal by Patient: Patient agitated yelling at Sylvie Bloch and PT to get out of room. Will continue to follow.     [] Surgery:     [] Intubation:     [] Pain Medication:    [] Sedation:     [] Spine Precautions :    [] Medical Instability:    [] Other:      ANA Peña/URIAH

## 2020-09-28 NOTE — PROGRESS NOTES
[facility]  Office: [address], DO, Ainsley Bellland, DO, Gustavo Chakraborty, DO, Velma [place] Blood, DO, Callie Stephenson MD, Al Krueger MD, Carlos Alberto Thorpe MD, Jeffrey Villar MD, Ramiro Aggarwal MD, Ruby Celestin MD, Rickie Meade MD, Timo Freeman MD, Mbonu Cassandria Favre, MD, Ishmael Rocha, DO, Rodo Pino MD, Francia Kern MD, Aidan Hutton, DO, Daya Cuenca MD,  Saintclair Lank, DO, Justa Sousa MD, Kaylan Pfeiffer MD, Daryle Springer, [facility], [facility], CNP, Art Branham, CNP, John Rico, CNS, Lisette Yi, CNP, Delvis Lora, CNP, Iván Noyola, CNP, Gema Mckay, CNP, Jose Srinivasan, CNP, Batool Andrew PA-C, Adolph Stern, [facility], Sd Encarnacion, CNP, Pritesh Abbott, CNP, Bentley Serra, CNP, Lorena Calzada, [facility], Chelle Quinteros, [facility]    Progress Note    9/28/2020    9:40 AM    Name:   Michell Colindres  MRN:     [id number]     Acct:      [de-identified]   Room:   [facility]/1003-02   Day:  9  Admit Date:  9/19/2020 11:24 AM    PCP:   GEOFF Lee CNP  Code Status:  Full Code    Subjective:     C/C:   Chief Complaint   Patient presents with    Dizziness    Palpitations     Interval History Status: not changed. Condition is again essentially stable. Patient is apparently agreeable to skilled nursing facility for rehabilitation. Patient states he will work with PT/OT with the intention of improving his quality of life. Patient refuses hospice and palliative care. Occult stool is positive, however, hemoglobin is stable for the past 7 days. ID has  De-escalated therapy. Urine output remains reduced. White blood cell count significantly increased overnight. Blood cultures and paracentesis cultures remain negative. No clear source for infectious process at this time. Patient condition remains labile. Patient continues to refuse many treatments. Palliative care consultation is requested.   Goals of care discussion is needed. Brief History:     9/19 -very poor historian. Known history of A. fib. Known history of alcoholism. Known history of cancer, likely malignant melanoma. Admitted from the nursing home with near syncope and palpitations. Patient is found to have A. fib with RVR and hypotension patient was treated according to ACLS protocols and cardiology was consulted    9/20 -patient is found to have decreased liver function, ascites, anemia requiring transfusion, elevated white count, and a single positive blood culture. Infectious disease, cardiology, gastroenterology are requested. 9/21 -patient condition has not changed. Patient heart rate and blood pressure is better controlled today. Hemoglobin continues to trend down. Infectious disease continues to follow and is narrowing antimicrobial therapy. We await paracentesis results. 9/22 -transition to oral medications. Await GI recommendations on anemia work-up. Paracentesis results still pending. 9/23 -GI apparently plans scopes for tomorrow. Paracentesis is negative for malignancy, culture results pending. 9/24 -patient condition essentially stable. Cardiology and infectious disease have de-escalate therapy. Anticipate discharge to skilled nursing facility for continued rehabilitation. 9/25 -we await GI intervention versus decision to follow as an outpatient. Patient is agreeable to SNF. Precertification and placement decisions are pending. 9/26-27  EGD and colonoscopy canceled at this time due to poor prep and patient noncompliant with GoLYTELY,  Will be done as outpatient,  Given a dose of Lasix last evening, blood pressure has been running low since then,  Started on IV Fortaz his urine output has dropped to 50 in 4 hours,  We will monitor today possible discharge tomorrow after PT.    9/28 -elevated white count (ID on), urinary output reduced, still working towards placement.     Review of Systems:     Review of Systems   Constitutional: Positive for activity change, appetite change and fatigue. Negative for chills, diaphoresis and fever. HENT: Negative for congestion, sinus pressure and sinus pain. Eyes: Negative for visual disturbance. Respiratory: Negative for cough, chest tightness, shortness of breath and wheezing. Cardiovascular: Positive for leg swelling. Negative for chest pain and palpitations. Gastrointestinal: Positive for diarrhea. Negative for abdominal pain, blood in stool, constipation, nausea and vomiting. Endocrine: Negative for cold intolerance and heat intolerance. Genitourinary: Negative for difficulty urinating, flank pain and frequency. Musculoskeletal: Positive for arthralgias. Skin: Positive for pallor. Negative for color change. Neurological: Positive for weakness and light-headedness. Negative for dizziness, numbness and headaches. Psychiatric/Behavioral: Negative for agitation and confusion. All other systems reviewed and are negative. Medications:      Allergies:  No Known Allergies    Current Meds:   Scheduled Meds:    furosemide  20 mg Oral Daily    polyethylene glycol  2,000 mL Oral Once    midodrine  10 mg Oral TID    dilTIAZem  30 mg Oral 4 times per day    ferrous sulfate  325 mg Oral TID WC    escitalopram  20 mg Oral Daily    mirtazapine  15 mg Oral Nightly    magnesium oxide  400 mg Oral Daily    gabapentin  300 mg Oral TID    [START ON 10/5/2020] vitamin D  50,000 Units Oral Q30 Days    pantoprazole  40 mg Oral BID AC    sodium chloride flush  10 mL Intravenous 2 times per day    digoxin  125 mcg Oral Daily    metoprolol tartrate  12.5 mg Oral BID    apixaban  5 mg Oral BID     Continuous Infusions:    sodium chloride 50 mL/hr at 09/28/20 0525     PRN Meds: oxyCODONE **OR** [DISCONTINUED] oxyCODONE, potassium chloride **OR** potassium alternative oral replacement **OR** potassium chloride, sodium chloride flush, sodium chloride flush    Data:     Past Medical History:   has a past medical history of History of alcoholism ([address] and Hypertension. Social History:   reports that he has been smoking. He has been smoking about 0.50 packs per day. He has never used smokeless tobacco. He reports current alcohol use. He reports that he does not use drugs. Family History: History reviewed. No pertinent family history. Vitals:  BP 94/60   Pulse 104   Temp 97.7 °F (36.5 °C) (Axillary)   Resp 16   Ht 5' 10\" (1.778 m)   Wt 114 lb (51.7 kg)   SpO2 92%   BMI 16.36 kg/m²   Temp (24hrs), Av.8 °F (36.6 °C), Min:97.5 °F (36.4 °C), Max:98.4 °F (36.9 °C)    No results for input(s): POCGLU in the last 72 hours. I/O (24Hr): Intake/Output Summary (Last 24 hours) at 2020 0940  Last data filed at 2020 0526  Gross per 24 hour   Intake 1489 ml   Output 100 ml   Net 1389 ml       Labs:  Hematology:  Recent Labs     20  0520  0516   WBC 13.7* 11.1 21.3*   RBC 3.93* 3.51* 3.69*   HGB 9.0* 8.2* 8.7*   HCT 31.5* 28.6* 30.9*   MCV 80.2* 81.5* 83.7   MCH 22.9* 23.4* 23.6*   MCHC 28.6 28.7 28.2*   RDW 19.0* 19.3* 19.6*    290 274   MPV 10.7 10.4 10.9     Chemistry:  Recent Labs     20  0520  0516    138 136   K 3.5* 3.7 3.7    108* 107   CO2 22 23 17*   GLUCOSE 84 97 96   BUN 16 19 23   CREATININE 0.74 0.82 1.05   ANIONGAP 10 7* 12   LABGLOM >60 >60 >60   GFRAA >60 >60 >60   CALCIUM 7.3* 7.0* 7.3*     No results for input(s): PROT, LABALBU, LABA1C, Q8YEIGF, R6NYGHZ, FT4, TSH, AST, ALT, LDH, GGT, ALKPHOS, LABGGT, BILITOT, BILIDIR, AMMONIA, AMYLASE, LIPASE, LACTATE, CHOL, HDL, LDLCHOLESTEROL, CHOLHDLRATIO, TRIG, VLDL, BBL24RU, PHENYTOIN, PHENYF, URICACID, POCGLU in the last 72 hours.   ABG:No results found for: POCPH, PHART, PH, POCPCO2, SFH2ACZ, PCO2, POCPO2, PO2ART, PO2, POCHCO3, TQX3IJS, HCO3, NBEA, PBEA, BEART, BE, THGBART, THB, KVR1YAJ, ZTDF0WIZ, M2WPAQOF, O2SAT, FIO2  Lab Results   Component Value Date/Time    SPECIAL NOT REPORTED 09/21/2020 12:00 PM     Lab Results   Component Value Date/Time    CULTURE NO GROWTH 6 DAYS 09/21/2020 12:00 PM       Radiology:  Ct Abdomen Pelvis W Iv Contrast    Result Date: 9/19/2020  1. Moderate right and small left pleural effusions with compressive atelectasis at the lung bases in this patient with emphysematous changes. 2.  Prominent gastric fold thickening most compatible with gastritis. 3.  Liver appears slightly lobular. Additionally, abdominal ascites is noted. The constellation of findings are consistent with cirrhosis. 4.  Enlarged prostate. Bladder wall appears thickened although bladder is suboptimally distended. 5.  Atherosclerotic disease. 6.  No bowel obstruction or urinary obstruction although gallstones are present. No evidence of appendicitis. Xr Chest Portable    Result Date: 9/19/2020  1. Right lower lobe airspace opacity potentially due to atelectasis, pneumonia, or aspiration. 2. Bony demineralization partially limits evaluation for fractures. Ct Chest Pulmonary Embolism W Contrast    Result Date: 9/19/2020  1. No evidence of pulmonary embolism. 2.  Emphysematous changes. 3. small to moderate right pleural effusion with trace left effusion and basilar atelectasis. 4.  Mild ascites upper abdomen. Physical Examination:        Physical Exam  Vitals signs and nursing note reviewed. Constitutional:       General: He is not in acute distress. Appearance: He is well-developed. He is not diaphoretic. Comments: Underweight and malnourished, poor personal hygiene   HENT:      Head: Normocephalic and atraumatic. Right Ear: Hearing normal.      Left Ear: Hearing normal.      Nose: Nose normal. No rhinorrhea. Eyes:      General: Lids are normal.      Extraocular Movements:      Right eye: Normal extraocular motion. Left eye: Normal extraocular motion.       Conjunctiva/sclera: Hx of left foot with left groin lymph node positive for melanoma         Dizziness 9/20/2020 Yes    Acute on chronic diastolic heart failure ([facility] 9/22/2020 Yes    Acute on chronic congestive heart failure ([facility] 9/22/2020 Yes          Plan:          · Stop antibiotic therapy per ID  · Add palliative care  · Digoxin and Cardizem oral transition went well. · PT/OT for outpatient recommendations on discharge, anticipate SNF, patient still agreeable, pre-CERT n [id number] pending  · Nectar thick diet due to swallow study recommendations  · Continue to follow paracentesis and blood cultures, thus far negative, negative for malignancy  · Severe malnutrition, p.o. intake not at home  · Prognosis seems poor, add palliative care consultation for goals of care discussion.           GEOFF Urbina NP  9/28/2020  9:40 AM

## 2020-09-28 NOTE — PROGRESS NOTES
Son Lai Current ([phone]) called update given on pt and palliative care consult initiated. Son would like to be involved with discussion. States has been calling patient with no answer. Advised working with PT/OT will have pt call when finished.

## 2020-09-28 NOTE — PROGRESS NOTES
Occupational Therapy  Facility/Department: [facility] PROGRESSIVE CARE  Daily Treatment Note  NAME: Yesica Polk  : 1945  MRN: [id number]    Date of Service: 2020    Discharge Recommendations:  Subacute/Skilled Nursing Facility       Assessment   Performance deficits / Impairments: Decreased functional mobility ; Decreased ADL status; Decreased strength;Decreased safe awareness;Decreased balance;Decreased sensation;Decreased endurance;Decreased posture  Prognosis: Fair  OT Education: OT Role;Plan of Care;Transfer Training;Energy Conservation;Precautions  REQUIRES OT FOLLOW UP: Yes  Activity Tolerance  Activity Tolerance: Patient limited by fatigue;Patient limited by pain  Safety Devices  Safety Devices in place: Yes  Type of devices: Call light within reach;Nurse notified; Patient at risk for falls; Left in bed;Bed alarm in place         Patient Diagnosis(es): The primary encounter diagnosis was Dizziness. Diagnoses of Atrial fibrillation with RVR ([facility] and Acute on chronic congestive heart failure, unspecified heart failure type [facility]) were also pertinent to this visit. has a past medical history of History of alcoholism ([facility] and Hypertension. has a past surgical history that includes hernia repair and Hydrocele surgery. Restrictions  Restrictions/Precautions  Restrictions/Precautions: Fall Risk, Up as Tolerated, Contact Precautions, General Precautions  Required Braces or Orthoses?: No  Position Activity Restriction  Other position/activity restrictions: L AKA, wounds on RLE, telemetry, RUE IV  Subjective   General  Chart Reviewed: Yes  Patient assessed for rehabilitation services?: Yes  Response to previous treatment: Patient with no complaints from previous session  Family / Caregiver Present: No      Orientation  Orientation  Overall Orientation Status: Within Functional Limits  Objective    ADL  Feeding: Setup; Beverage management; Increased time to complete        Balance  Sitting Balance: Contact guard assistance(x2)  Standing Balance  Time: Patient sat EOB ~5 minute with CGA x2  Activity: weight shifting laterally and forward and bringing self back to midline to increase core control  Comment: verbal cues to keep head up for better posture and reduced pain  Functional Mobility  Functional Mobility Comments: pt is non-ambulatory at baseline  Bed mobility  Rolling to Left: Maximum assistance;2 Person assistance  Rolling to Right: Maximum assistance;2 Person assistance  Supine to Sit: Maximum assistance;2 Person assistance  Sit to Supine: Maximum assistance;2 Person assistance  Scooting: Maximal assistance;2 Person assistance  Comment: MAX verbal cues for participation, [place] assist to reach for bed rails MAX A x2 physical assist for RLE movement and to bring patient in upright seated position  Transfers  Sit to stand: Unable to assess  Stand to sit: Unable to assess         Cognition  Overall Cognitive Status: Exceptions  Arousal/Alertness: Appropriate responses to stimuli  Following Commands: Follows one step commands with repetition; Follows one step commands with increased time  Attention Span: Appears intact  Memory: Decreased recall of recent events;Decreased long term memory;Decreased recall of biographical Information  Problem Solving: Assistance required to generate solutions;Assistance required to identify errors made;Assistance required to correct errors made;Assistance required to implement solutions  Insights: Decreased awareness of deficits  Initiation: Requires cues for some  Sequencing: Requires cues for some      Plan   Plan  Times per week: 4-5x/week  Current Treatment Recommendations: Strengthening, Balance Training, Functional Mobility Training, Endurance Training, Safety Education & Training, Self-Care / ADL, Positioning, Equipment Evaluation, Education, & procurement, Patient/Caregiver Education & Training  AM-PAC Score        AM-PAC Inpatient Daily Activity Raw Score: 12 (09/28/20 1337)  AM-PAC Inpatient ADL T-Scale Score : 30.6 (09/28/20 1337)  ADL Inpatient CMS 0-100% Score: 66.57 (09/28/20 1337)  ADL Inpatient CMS G-Code Modifier : CL (09/28/20 1337)    Goals  Short term goals  Time Frame for Short term goals: by discharge, pt will  Short term goal 1: demo mod A with bed mob with rails/controls  Short term goal 2: tolerate sitting EOB x 15 min for inc ADL participation and prep for transfers  Short term goal 3: Participate in B UE HEP for inc. functional strength for ADLs/mob  Short term goal 4: complete simple grooming with SBA following set up and min A with UB ADLs  Patient Goals   Patient goals : not stated       Therapy Time   Individual Concurrent Group Co-treatment   Time In 7282         Time Out 1330         Minutes 23           Upon writer exit, call light within reach, pt retired to bed. All lines intact and patient positioned comfortably. All patient needs addressed prior to ending therapy session. Chart reviewed prior to treatment and patient is agreeable for therapy. RN reports patient is medically stable for therapy treatment this date. Co-treatment with PT warranted secondary to decreased safety and independence requiring 2 skilled therapy professionals to address individual discipline's goals. OT addressing preparation for ADL transfer, sitting balance for increased ADL performance, sitting/activity tolerance, functional reaching, environmental safety/scanning, fall prevention, functional mobility for ADL transfers and functional UE strength.       ANA Peña/URIAH

## 2020-09-28 NOTE — PLAN OF CARE
Problem: Skin Integrity:  Goal: Will show no infection signs and symptoms  Description: Will show no infection signs and symptoms  9/28/2020 0210 by Reynold Madison RN  Outcome: Ongoing  9/28/2020 0208 by Reynold Madison RN  Outcome: Ongoing  9/27/2020 1858 by Aneesh Dougherty RN  Outcome: Ongoing     Problem: Cardiac:  Goal: Ability to maintain vital signs within normal range will improve  Description: Ability to maintain vital signs within normal range will improve  9/27/2020 1858 by Aneesh Dougherty RN  Outcome: Ongoing     Problem: Falls - Risk of:  Goal: Will remain free from falls  Description: Will remain free from falls  9/28/2020 0210 by Reynold Madison RN  Outcome: Ongoing

## 2020-09-28 NOTE — PROGRESS NOTES
Physical Therapy  Facility/Department: [facility] PROGRESSIVE CARE  Daily Treatment Note  NAME: Freddy Fleming  : 1945  MRN: [id number]    Date of Service: 2020    Discharge Recommendations:  Subacute/Skilled Nursing Facility        Assessment   Body structures, Functions, Activity limitations: Decreased functional mobility ; Decreased endurance;Decreased strength;Decreased balance; Increased pain;Decreased safe awareness  Assessment: Pt with max to dependent deficits of funtional mobility, balance, fatigue & endurance. Pt could only sit EOB & has poor core stability & needed 2 assist for ALL functional mobility. Pt requires continued IP PT & D/C to [address] to maximize independence with functional mobility, balance, safety awareness & activity tolerance. Pt HIGH risk for falls & strongly suggest SNF at D/C to return to SAFE function and enable D/C back to home environment. Prognosis: Fair  Decision Making: Medium Complexity  Exam: Exam:  functional mobility, activity tolerance, Balance, & [employer] AM-PAC 6 Clicks Basic Mobility     Clinical Presentation: evolving  PT Education: Functional Mobility Training;Pressure Relief;Plan of Care  Patient Education: seated balance activity, Ed on importance of activity & participation to reduce sedentary complications  REQUIRES PT FOLLOW UP: Yes  Activity Tolerance  Activity Tolerance: Patient limited by pain; Patient limited by fatigue;Patient limited by endurance     Patient Diagnosis(es): The primary encounter diagnosis was Dizziness. Diagnoses of Atrial fibrillation with RVR ([facility] and Acute on chronic congestive heart failure, unspecified heart failure type [facility]) were also pertinent to this visit. has a past medical history of History of alcoholism ([facility] and Hypertension. has a past surgical history that includes hernia repair and Hydrocele surgery.     Restrictions  Restrictions/Precautions  Restrictions/Precautions: Fall Risk, Up as Tolerated, Contact Precautions, General Precautions  Required Braces or Orthoses?: No  Position Activity Restriction  Other position/activity restrictions: L AKA, wounds on RLE, telemetry, LUE IV, Heels off bed if unable to move LE's, turn/assist Q 2 hrs if unable to turn self  Subjective   General  Chart Reviewed: Yes  Response To Previous Treatment: Patient with no complaints from previous session. Family / Caregiver Present: No  Subjective  Subjective: Pt initiaslly refusing PT but then agreeable  General Comment  Comments: RN okays PT  Pain Screening  Patient Currently in Pain: Denies  Pain Assessment  Response to Pain Intervention: Asleep with RR greater than 10  Vital Signs  BP Location: Left upper arm  Level of Consciousness: Alert  Patient Currently in Pain: Denies  Oxygen Therapy  O2 Device: None (Room air)       Orientation  Orientation  Overall Orientation Status: Within Functional Limits  Orientation Level: Oriented to place;Oriented to person;Oriented to situation;Oriented to time  Cognition     Cognition  Overall Cognitive Status: Exceptions  Arousal/Alertness: Appropriate responses to stimuli  Following Commands: Follows one step commands with repetition; Follows one step commands with increased time  Attention Span: Appears intact  Memory: Decreased recall of recent events;Decreased long term memory;Decreased recall of biographical Information  Problem Solving: Assistance required to generate solutions;Assistance required to identify errors made;Assistance required to correct errors made;Assistance required to implement solutions  Insights: Decreased awareness of deficits  Initiation: Requires cues for some  Sequencing: Requires cues for some        Objective   Bed mobility  Rolling to Left: Maximum assistance;2 Person assistance  Rolling to Right: Maximum assistance;2 Person assistance  Supine to Sit: Maximum assistance;2 Person assistance  Sit to Supine: Maximum assistance;2 Person assistance  Scooting: Dependent/Total  Comment: Max verbal instruction/tactile assist for UE hand placement on rail, for assist of LE's and proper log rolling tech, to sit upright with increased time needed. Transfers  Comment: Unsafe to assess, N/A  Ambulation  Ambulation?: No(pt non-ambulatory at baseline)     Balance  Posture: Poor  Sitting - Static: Fair  Sitting - Dynamic: Fair;-  Comments: Pt sat EOB for 10 minutes  Exercises  Comments: sitting balance ex's                        G-Code     OutComes Score                                                     AM-PAC Score  AM-PAC Inpatient Mobility Raw Score : 9  AM-[facility] Inpatient T-Scale Score : 30.55  Mobility Inpatient CMS 0-100% Score: 81.38  Mobility Inpatient CMS G-Code Modifier:CM                   Goals  Short term goals  Time Frame for Short term goals: 12 visits  Short term goal 1: Pt will roll left<>right with SBA and use of handrails  Short term goal 2: Pt will scoot self in bed with SBA in order to reposition self. Short term goal 3: Pt will tolerate 25mins of PT to improve activity tolerance, strength, and functional mobility  Short term goal 4: Pt will perform supine<>sit with modA x2  Patient Goals   Patient goals : To get better    Plan    Plan  Times per week: 1-2x day / 5-6 days per week  Specific instructions for Next Treatment: if tolerable, assess supine<>sit  Current Treatment Recommendations: Strengthening, Functional Mobility Training, Positioning, Safety Education & Training, Endurance Training  Safety Devices  Type of devices: All fall risk precautions in place, Bed alarm in place, Call light within reach, Nurse notified, Left in bed     Therapy Time   Individual Concurrent Group Co-treatment   Time In 9737([id number])         Time Out [id number])         Minutes 5+19=24              Co-treatment with OT warranted secondary to decreased safety and independence requiring 2 skilled therapy professionals to address individual discipline's goals. PT addressing pre gait trunk strengthening, weight shifting prior to transfers, transfer training and postural control in sitting.       [address], PT

## 2020-09-28 NOTE — CARE COORDINATION
SHANNON received email from Antonia Ponce with [facility] containing the peer to peer information  SHANNON emailed Dr. Daphne Herzog to inform of peer to peer and find out if she can assist.  Dr. Daphne Herzog can assist with the peer to peer. The pt will need to work with therapy before she can complete the peer to peer. SHANNON updated Som Mistry RN clinical lead regarding the pt need to stop refusing PT/OT so a peer to peer can be initiated. Som Mistry called the therapy team to request for them to work with pt. SHANNON spoke with Antonia Ponce from [facility] regarding the physician would like to try for a peer to peer. SHANNON asked if denied what are the other options, Antonia Ponce discussed long-term and explained the pt would need to agree when his insurance call him to confirm. SHANNON explained the pt cannot return to [facility] due to diet restrictions. SHANNON called pt son to give up and answer questions. Yuki Yang and Satnam Montenegro met with pt to discuss discharge planning. SHANNON explained the pt insurance company denied him for a SNF stay at [facility]. SHANNON explained the physician is willing to complete a peer to peer but the pt would need to work with therapy. If denied after the peer to peer, the other option would be for the pt to go to [facility] long term. Pt is agreeable to go to the facility long term, pt reports he will inform the insurance company he is agreeable to go to the facility long-term when he is contacted. SHANNON explained [facility] reports the pt cannot return due to diet restrictions.

## 2020-09-29 VITALS
HEIGHT: 70 IN | HEART RATE: 102 BPM | DIASTOLIC BLOOD PRESSURE: 61 MMHG | OXYGEN SATURATION: 94 % | SYSTOLIC BLOOD PRESSURE: 104 MMHG | TEMPERATURE: 97.3 F | BODY MASS INDEX: 16.72 KG/M2 | RESPIRATION RATE: 18 BRPM | WEIGHT: 116.8 LBS

## 2020-09-29 LAB
ABSOLUTE EOS #: 0 K/UL (ref 0–0.4)
ABSOLUTE IMMATURE GRANULOCYTE: 0.36 K/UL (ref 0–0.3)
ABSOLUTE LYMPH #: 2.31 K/UL (ref 1–4.8)
ABSOLUTE MONO #: 0.89 K/UL (ref 0.2–0.8)
ANION GAP SERPL CALCULATED.3IONS-SCNC: 11 MMOL/L (ref 9–17)
BASOPHILS # BLD: 0 %
BASOPHILS ABSOLUTE: 0 K/UL (ref 0–0.2)
BUN BLDV-MCNC: 25 MG/DL (ref 8–23)
BUN/CREAT BLD: 29 (ref 9–20)
CALCIUM SERPL-MCNC: 7.3 MG/DL (ref 8.6–10.4)
CHLORIDE BLD-SCNC: 113 MMOL/L (ref 98–107)
CO2: 17 MMOL/L (ref 20–31)
CREAT SERPL-MCNC: 0.87 MG/DL (ref 0.7–1.2)
DIFFERENTIAL TYPE: ABNORMAL
EOSINOPHILS RELATIVE PERCENT: 0 % (ref 1–4)
GFR AFRICAN AMERICAN: >60 ML/MIN
GFR NON-AFRICAN AMERICAN: >60 ML/MIN
GFR SERPL CREATININE-BSD FRML MDRD: ABNORMAL ML/MIN/{1.73_M2}
GFR SERPL CREATININE-BSD FRML MDRD: ABNORMAL ML/MIN/{1.73_M2}
GLUCOSE BLD-MCNC: 98 MG/DL (ref 70–99)
HCT VFR BLD CALC: 32.2 % (ref 40.7–50.3)
HEMOGLOBIN: 8.9 G/DL (ref 13–17)
IMMATURE GRANULOCYTES: 2 %
LACTIC ACID: 3.9 MMOL/L (ref 0.5–2.2)
LYMPHOCYTES # BLD: 13 % (ref 24–44)
MCH RBC QN AUTO: 23.2 PG (ref 25.2–33.5)
MCHC RBC AUTO-ENTMCNC: 27.6 G/DL (ref 28.4–34.8)
MCV RBC AUTO: 84.1 FL (ref 82.6–102.9)
MONOCYTES # BLD: 5 % (ref 1–7)
MORPHOLOGY: ABNORMAL
NRBC AUTOMATED: 0 PER 100 WBC
PDW BLD-RTO: 20.1 % (ref 11.8–14.4)
PLATELET # BLD: 162 K/UL (ref 138–453)
PLATELET ESTIMATE: ABNORMAL
PMV BLD AUTO: 11.6 FL (ref 8.1–13.5)
POTASSIUM SERPL-SCNC: 4 MMOL/L (ref 3.7–5.3)
RBC # BLD: 3.83 M/UL (ref 4.21–5.77)
RBC # BLD: ABNORMAL 10*6/UL
SEG NEUTROPHILS: 80 % (ref 36–66)
SEGMENTED NEUTROPHILS ABSOLUTE COUNT: 14.24 K/UL (ref 1.8–7.7)
SODIUM BLD-SCNC: 141 MMOL/L (ref 135–144)
WBC # BLD: 17.8 K/UL (ref 3.5–11.3)
WBC # BLD: ABNORMAL 10*3/UL

## 2020-09-29 PROCEDURE — 6370000000 HC RX 637 (ALT 250 FOR IP): Performed by: FAMILY MEDICINE

## 2020-09-29 PROCEDURE — 6370000000 HC RX 637 (ALT 250 FOR IP): Performed by: INTERNAL MEDICINE

## 2020-09-29 PROCEDURE — 80048 BASIC METABOLIC PNL TOTAL CA: CPT

## 2020-09-29 PROCEDURE — 83605 ASSAY OF LACTIC ACID: CPT

## 2020-09-29 PROCEDURE — 2580000003 HC RX 258: Performed by: INTERNAL MEDICINE

## 2020-09-29 PROCEDURE — APPSS45 APP SPLIT SHARED TIME 31-45 MINUTES: Performed by: NURSE PRACTITIONER

## 2020-09-29 PROCEDURE — 6370000000 HC RX 637 (ALT 250 FOR IP): Performed by: NURSE PRACTITIONER

## 2020-09-29 PROCEDURE — 85025 COMPLETE CBC W/AUTO DIFF WBC: CPT

## 2020-09-29 PROCEDURE — 97535 SELF CARE MNGMENT TRAINING: CPT

## 2020-09-29 PROCEDURE — 97530 THERAPEUTIC ACTIVITIES: CPT

## 2020-09-29 PROCEDURE — 36415 COLL VENOUS BLD VENIPUNCTURE: CPT

## 2020-09-29 PROCEDURE — 99232 SBSQ HOSP IP/OBS MODERATE 35: CPT | Performed by: INTERNAL MEDICINE

## 2020-09-29 RX ORDER — DIGOXIN 125 MCG
125 TABLET ORAL DAILY
Qty: 30 TABLET | Refills: 3 | Status: SHIPPED | OUTPATIENT
Start: 2020-09-30

## 2020-09-29 RX ORDER — DILTIAZEM HYDROCHLORIDE 120 MG/1
120 CAPSULE, COATED, EXTENDED RELEASE ORAL DAILY
Qty: 30 CAPSULE | Refills: 3 | Status: SHIPPED | OUTPATIENT
Start: 2020-09-29

## 2020-09-29 RX ORDER — CEPHALEXIN 500 MG/1
500 CAPSULE ORAL 4 TIMES DAILY
Qty: 28 CAPSULE | Refills: 0 | Status: SHIPPED | OUTPATIENT
Start: 2020-09-29

## 2020-09-29 RX ORDER — MIDODRINE HYDROCHLORIDE 10 MG/1
10 TABLET ORAL 3 TIMES DAILY
Qty: 90 TABLET | Refills: 3 | Status: SHIPPED | OUTPATIENT
Start: 2020-09-29

## 2020-09-29 RX ORDER — FUROSEMIDE 20 MG/1
20 TABLET ORAL DAILY
Qty: 60 TABLET | Refills: 3 | Status: SHIPPED | OUTPATIENT
Start: 2020-09-30

## 2020-09-29 RX ADMIN — DILTIAZEM HYDROCHLORIDE 30 MG: 30 TABLET, FILM COATED ORAL at 07:47

## 2020-09-29 RX ADMIN — FERROUS SULFATE TAB EC 325 MG (65 MG FE EQUIVALENT) 325 MG: 325 (65 FE) TABLET DELAYED RESPONSE at 11:58

## 2020-09-29 RX ADMIN — ESCITALOPRAM OXALATE 20 MG: 10 TABLET ORAL at 07:48

## 2020-09-29 RX ADMIN — DILTIAZEM HYDROCHLORIDE 30 MG: 30 TABLET, FILM COATED ORAL at 14:35

## 2020-09-29 RX ADMIN — SODIUM CHLORIDE: 9 INJECTION, SOLUTION INTRAVENOUS at 00:30

## 2020-09-29 RX ADMIN — MIDODRINE HYDROCHLORIDE 10 MG: 10 TABLET ORAL at 07:48

## 2020-09-29 RX ADMIN — METOPROLOL TARTRATE 12.5 MG: 25 TABLET, FILM COATED ORAL at 07:48

## 2020-09-29 RX ADMIN — APIXABAN 5 MG: 5 TABLET, FILM COATED ORAL at 07:48

## 2020-09-29 RX ADMIN — PANTOPRAZOLE SODIUM 40 MG: 40 TABLET, DELAYED RELEASE ORAL at 05:31

## 2020-09-29 RX ADMIN — PANTOPRAZOLE SODIUM 40 MG: 40 TABLET, DELAYED RELEASE ORAL at 14:35

## 2020-09-29 RX ADMIN — FERROUS SULFATE TAB EC 325 MG (65 MG FE EQUIVALENT) 325 MG: 325 (65 FE) TABLET DELAYED RESPONSE at 07:48

## 2020-09-29 RX ADMIN — MIDODRINE HYDROCHLORIDE 10 MG: 10 TABLET ORAL at 14:35

## 2020-09-29 RX ADMIN — FUROSEMIDE 20 MG: 20 TABLET ORAL at 07:48

## 2020-09-29 RX ADMIN — MAGNESIUM GLUCONATE 500 MG ORAL TABLET 400 MG: 500 TABLET ORAL at 07:49

## 2020-09-29 RX ADMIN — DIGOXIN 125 MCG: 125 TABLET ORAL at 07:48

## 2020-09-29 RX ADMIN — DILTIAZEM HYDROCHLORIDE 30 MG: 30 TABLET, FILM COATED ORAL at 03:32

## 2020-09-29 RX ADMIN — GABAPENTIN 300 MG: 300 CAPSULE ORAL at 14:35

## 2020-09-29 RX ADMIN — GABAPENTIN 300 MG: 300 CAPSULE ORAL at 07:48

## 2020-09-29 ASSESSMENT — ENCOUNTER SYMPTOMS
VOMITING: 0
DIARRHEA: 1
COLOR CHANGE: 0
SHORTNESS OF BREATH: 0
SINUS PRESSURE: 0
BLOOD IN STOOL: 0
ABDOMINAL PAIN: 0
COUGH: 0
CHEST TIGHTNESS: 0
WHEEZING: 0
SINUS PAIN: 0
CONSTIPATION: 0
NAUSEA: 0

## 2020-09-29 ASSESSMENT — PAIN SCALES - GENERAL
PAINLEVEL_OUTOF10: 0

## 2020-09-29 NOTE — PROGRESS NOTES
Charge nurse Geo Fregoso RN asked about need for garcia as it has been in for 10 days. Writer explained how patient has several skin issues including sores around penis tip that patient came in with d/t sitting in urine at home. Writer reached out to wound care nurse whom was not aware of these specific sores. Wound care nurse will be in tomorrow AM to assess. James Ambrocio NP notified and no new orders to remove garcia. Patient benefits from garcia at this time. Urine sent to lab for cultures. Will continue to closely monitor.

## 2020-09-29 NOTE — PROGRESS NOTES
Physical Therapy  Facility/Department: [facility] PROGRESSIVE CARE  Daily Treatment Note  NAME: Cat Leos  : 1945  MRN: [id number]    Date of Service: 2020    Discharge Recommendations:  Subacute/Skilled Nursing Facility        Assessment   Body structures, Functions, Activity limitations: Decreased functional mobility ; Decreased endurance;Decreased strength;Decreased balance; Increased pain;Decreased safe awareness  Assessment: Pt tolerated has max to dependent deficits of funtional mobility, balance, fatigue & endurance. Pt sat EOB & demonstrated improved core stability & able to initiate transfers with use of [place]. Pt requires continued IP PT & D/C to [address] to maximize independence with functional mobility, balance, safety awareness & activity tolerance. Pt still HIGH risk for falls & strongly suggest SNF at D/C to return to SAFE function and enable D/C back to home environment. Prognosis: Fair  Decision Making: Medium Complexity  Exam: functional mobility, activity tolerance, Balance, & [employer] AM-PAC 6 Clicks Basic Mobility  Clinical Presentation: evolving  PT Education: Functional Mobility Training;Pressure Relief;Plan of Care;Transfer Training  REQUIRES PT FOLLOW UP: Yes  Activity Tolerance  Activity Tolerance: Patient limited by pain; Patient limited by fatigue;Patient limited by endurance     Patient Diagnosis(es): The primary encounter diagnosis was Dizziness. Diagnoses of At[facility] fibrillation with R[facility] ([facility] and Acute on chronic congestive heart failure, unspecified heart failure type [facility]) were also pertinent to this visit. has a past medical history of History of alcoholism ([facility] and Hypertension. has a past surgical history that includes hernia repair and Hydrocele surgery.     Restrictions  Restrictions/Precautions  Restrictions/Precautions: Fall Risk, Up as Tolerated, Contact Precautions, General Precautions  Required Braces or Orthoses?: No  Position Activity Restriction  Other position/activity restrictions: L AKA, wounds on RLE, telemetry, LUE IV, Heels off bed if unable to move LE's, turn/assist Q 2 hrs if unable to turn self, garcia, fecal mgt system  Subjective   General  Chart Reviewed: Yes  Response To Previous Treatment: Patient with no complaints from previous session.   Family / Caregiver Present: No  Subjective  Subjective: Pt initiaslly refusing PT but then agreeable  General Comment  Comments: RN sally PT  Pain Screening  Patient Currently in Pain: Denies  Pain Assessment  Response to Pain Intervention: Asleep with RR greater than 10  Vital Signs  BP Location: Left upper arm  Level of Consciousness: Alert  Patient Currently in Pain: Denies  Oxygen Therapy  O2 Device: None (Room air)       Orientation  Orientation  Overall Orientation Status: Within Functional Limits  Orientation Level: Oriented to place;Oriented to person;Oriented to situation;Oriented to time  Cognition      Objective   Bed mobility  Bridging: Maximum assistance;2 Person assistance  Rolling to Right: Maximum assistance;2 Person assistance  Supine to Sit: Maximum assistance;2 Person assistance  Sit to Supine: Maximum assistance;2 Person assistance  Scooting: Maximal assistance;2 Person assistance  Comment: Max cues + tactile assist for UE hand placement on rail, for assist of LE's and proper tech & max support for RLE & trunk control to come to sit & for return to laying  Transfers  Sit to Stand: Maximum Assistance;2 Person Assistance  Stand to sit: Maximum Assistance;2 Person Assistance  Lateral Transfers: Dependent/Total  Comment: use of Jennifer garza for sit/stand, max cues + tactile asssit for correct technique & UE placement on support bar  Ambulation  Ambulation?: No(pt non-ambulatory at baseline)     Balance  Posture: Poor  Sitting - Static: Good;-  Sitting - Dynamic: Fair;+  Standing - Static: Poor  Standing - Dynamic: Poor;-  Comments: Pt sat EOB for 10 minutes Dorothey Skiff, stood into device with 2max  assist, mobilized to [address] sat from device with 2 max assist     All lines intact, call light within reach, and patient positioned comfortably at end of treatment. All patient needs addressed prior to ending therapy session. G-Code     OutComes Score                                                     AM-PAC Score  AM-PAC Inpatient Mobility Raw Score : 9 (09/29/20 1402)  AM-PAC Inpatient T-Scale Score : 30.55 (09/29/20 1402)  Mobility Inpatient CMS 0-100% Score: 81.38 (09/29/20 1402)  Mobility Inpatient CMS G-Code Modifier : CM (09/29/20 1402)          Goals  Short term goals  Time Frame for Short term goals: 12 visits  Short term goal 1: Pt will roll left<>right with SBA and use of handrails  Short term goal 2: Pt will scoot self in bed with SBA in order to reposition self. Short term goal 3: Pt will tolerate 25mins of PT to improve activity tolerance, strength, and functional mobility  Short term goal 4: Pt will perform supine<>sit with modA x2  Patient Goals   Patient goals : To get better    Plan    Plan  Times per week: 1-2x day / 5-6 days per week  Specific instructions for Next Treatment: if tolerable, assess supine<>sit  Current Treatment Recommendations: Strengthening, Functional Mobility Training, Positioning, Safety Education & Training, Endurance Training  Safety Devices  Type of devices:  All fall risk precautions in place, Bed alarm in place, Call light within reach, Nurse notified, Left in bed     Therapy Time   Individual Concurrent Group Co-treatment   Time In 1342         Time Out 1402         Minutes [address] [facility], PT

## 2020-09-29 NOTE — PLAN OF CARE
Problem: Skin Integrity:  Goal: Absence of new skin breakdown  Description: Absence of new skin breakdown. Waffle mattress in place. Q2 turns. Barrier cream in place. 9/29/2020 1041 by Lesley Gloria RN  Outcome: Ongoing     Problem: Cardiac:  Goal: Ability to maintain vital signs within normal range will improve  Description: Ability to maintain vital signs within normal range will improve  9/29/2020 1041 by Lesley Gloria RN  Outcome: Ongoing     Problem: Falls - Risk of:  Goal: Will remain free from falls  Description: Will remain free from falls. Fall risk assessment completed. Patient instructed to use call light. Bed locked and in lowest position, side rails up 2/4, call light and bedside table within reach, clutter removed, and non-skid footwear on when pt out of bed. Hourly rounds will continue. Bed alarm in use.    Outcome: Ongoing

## 2020-09-29 NOTE — PROGRESS NOTES
Report called and given to Berenice Duque [facility]. Call back number provided with any further questions. Packet made. IV's and telemetry removed. Brief checked. FMS will remain in place with extra bag and instructions sent with patient to facility. Awaiting transportation.

## 2020-09-29 NOTE — CONSULTS
small to moderate right pleural effusion with trace left effusion and bibasilar atelectasis. CT abdomen and pelvis revealed  1.  Moderate right and small left pleural effusions with compressive    atelectasis at the lung bases in this patient with emphysematous changes.         2.  Prominent gastric fold thickening most compatible with gastritis.         3.  Liver appears slightly lobular.  Additionally, abdominal ascites is    noted.  The constellation of findings are consistent with cirrhosis.         4.  Enlarged prostate.  Bladder wall appears thickened although bladder is    suboptimally distended.         5.  Atherosclerotic disease.         6.  No bowel obstruction or urinary obstruction although gallstones are    present.  No evidence of appendicitis.           Patient was given IV fluid bolus and Cardizem bolus of 10 mg. He was admitted to PCU with cardiology, GI, and ID consult. 1 of 2 blood cultures positive for coagulase negative staphylococcus species. I recommended paracentesis with culture and 150 mL was drained and negative for SBP and malignancy. ID treated with antibiotics for possible RLL pneumonia, but infectious work-up was negative so antibiotics were stopped. GI is recommending EGD/colonoscopy due to anemia, but patient is refusing. Patient has received 1 unit of blood and hemoglobin has remained stable. Patient was refusing therapy, but worked with therapy today. Peer to peer completed for SNF stay. RN reports patient was approved for [facility] and will possibly be discharged later today. Of care consulted for review of goals, CODE STATUS, symptom management, and support. 9/29 pertinent labs included; lactic 3.9, hemoglobin 8.9, white blood cells 17.8, BUN 25, calcium 7.3, and CO2 17.     Active Hospital Problems    Diagnosis Date Noted    Severe malnutrition ([facility] [E43] 09/26/2020    Acute on chronic diastolic heart failure (HCC) [I50.33] 09/22/2020    Acute on chronic congestive heart failure (HCC) [I50.9]     Malignant melanoma of lower extremity, including hip (HCC) [C43.70] 09/20/2020    Dizziness [R42]     Atrial fibrillation with RVR (HCC) [I48.91] 09/19/2020    Elevated brain natriuretic peptide (BNP) level [R79.89] 09/19/2020    [facility] of [facility] [F10.21]     Cirrhosis of liver with ascites ([facility] [K74.60, R18.8]     Chronic atrial fibrillation [I48.20]     Leukocytosis [D72.829]     Anemia [D64.9]     Thrombocytosis ([facility] [D47.3]     Sarcopenia [M62.84]     Hyponatremia [E87.1]     Sepsis ([facility] [A41.9]        PAST MEDICAL HISTORY      Diagnosis Date    [facility] of [facility]     Hypertension        PAST SURGICAL HISTORY  Past Surgical History:   Procedure Laterality Date    HERNIA REPAIR      HYDROCELE EXCISION         SOCIAL HISTORY  Social History     Tobacco Use    Smoking status: Current Every Day Smoker     Packs/day: 0.50    Smokeless tobacco: Never Used   Substance Use Topics    Alcohol use: Yes     Comment: former alcoholic    Drug use: No       ALLERGIES  No Known Allergies      MEDICATIONS  Current Medications    furosemide  20 mg Oral Daily    polyethylene glycol  2,000 mL Oral Once    midodrine  10 mg Oral TID    dilTIAZem  30 mg Oral 4 times per day    ferrous sulfate  325 mg Oral TID WC    escitalopram  20 mg Oral Daily    mirtazapine  15 mg Oral Nightly    magnesium oxide  400 mg Oral Daily    gabapentin  300 mg Oral TID    [START ON 10/5/2020] vitamin D  50,000 Units Oral Q30 Days    pantoprazole  40 mg Oral BID AC    sodium chloride flush  10 mL Intravenous 2 times per day    digoxin  125 mcg Oral Daily    metoprolol tartrate  12.5 mg Oral BID    apixaban  5 mg Oral BID     oxyCODONE **OR** [DISCONTINUED] oxyCODONE, potassium chloride **OR** potassium alternative oral replacement **OR** potassium chloride, sodium chloride flush, sodium chloride flush  IV Drips/Infusions   sodium chloride 50 mL/hr at 09/29/20 0030     Home Medications  No current facility-administered medications on file prior to encounter. Current Outpatient Medications on File Prior to Encounter   Medication Sig Dispense Refill    bethanechol (URECHOLINE) 25 MG tablet Take 25 mg by mouth 3 times daily      bisacodyl (DULCOLAX) 5 MG EC tablet Take 5 mg by mouth every 12 hours as needed for Constipation      vitamin D (ERGOCALCIFEROL) 1.25 MG (28542 UT) CAPS capsule Take 50,000 Units by mouth every 30 days Takes on the first Monday of every month      dutasteride (AVODART) 0.5 MG capsule Take 0.5 mg by mouth daily      ferrous sulfate (FE TABS 325) 325 (65 Fe) MG EC tablet Take 325 mg by mouth 3 times daily (with meals)      alendronate (FOSAMAX) 70 MG tablet Take 70 mg by mouth every 7 days Takes on Saturdays      escitalopram (LEXAPRO) 20 MG tablet Take 20 mg by mouth daily      lidocaine 4 % external patch Place 1 patch onto the skin daily 12 hours on, 12 hours off.  magnesium oxide (MAG-OX) 400 MG tablet Take 400 mg by mouth daily      mirtazapine (REMERON) 15 MG tablet Take 15 mg by mouth nightly      gabapentin (NEURONTIN) 600 MG tablet Take 600 mg by mouth 3 times daily.  HYDROcodone-acetaminophen (NORCO) 7.5-325 MG per tablet Take 1 tablet by mouth every 6 hours as needed for Pain.       pantoprazole (PROTONIX) 40 MG tablet Take 40 mg by mouth 2 times daily      senna-docusate (PERICOLACE) 8.6-50 MG per tablet Take 2 tablets by mouth daily      tamsulosin (FLOMAX) 0.4 MG capsule Take 0.4 mg by mouth daily         Data         /64   Pulse 58   Temp 97.3 °F (36.3 °C) (Oral)   Resp 20   Ht 5' 10\" (1.778 m)   Wt 116 lb 12.8 oz (53 kg)   SpO2 94%   BMI 16.76 kg/m²     Wt Readings from Last 3 Encounters:   09/29/20 116 lb 12.8 oz (53 kg)   08/27/17 (S) 127 lb (57.6 kg)        Code Status: Full Code     ADVANCED CARE PLANNING:  Patient has capacity for medical decisions: yes  [address] of : no  Living Will: no     Personal, Social, and Family History  Marital Status: single  Living situation:assisted living  Importance of gordy/Yarsanism/spiritual beliefs: [] Very [] Somewhat [] Not   Psychological Distress: mild  Does patient understand diagnosis/treatment? yes  Does caregiver understand diagnosis/treatment? yes      Assessment        REVIEW OF SYSTEMS  Constitutional: no fever, no chills +weight loss- per son he was 130 pounds prior to assisted living. Eyes: no eye pain or blurred vision  ENT: no hearing loss, congestion, or difficulty swallowing   Respiratory: no wheezing, chest tightness, or shortness of breath   Cardiovascular: no chest pain or pressure, no palpitations, no diaphoresis   Gastrointestinal: no nausea, vomiting, abdominal pain, or constipation, no melena +diarrhea   Genitourinary: no dysuria, frequency,hematuria , or nocturia +[place]kins  Musculoskeletal: no myalgias or arthralgias, no back pain   Skin: no rashes +RLE wound  Neurological: no focal weakness, numbness, tingling, or headache, no seizures    PHYSICAL ASSESSMENT:  Constitutional: Alert and oriented to person, place, and time. Head: Normocephalic and atraumatic. Eyes: EOM are normal. Pupils are equal, round   Neck: Normal range of motion. Neck supple. No tracheal deviation present. Cardiovascular: Normal rate and irregular rhythm, S1, S2, no murmur   Pulmonary/Chest: Effort normal and breath sounds normal/diminshed bases. No rales or wheezes. Abdomen: Soft. No tenderness, not distended, no ascites, no organomegaly +FMS  Musculoskeletal: Normal range of motion. No edema lower ext.  +L AKA  Neurological: CN II-XII grossly intact, no focal neurological deficits   Skin: Normal turgor, no bleeding, no bruising +RLE dressing/ace wrapped- D&I    Palliative Performance Scale:  ___60%  Ambulation reduced; Significant disease; Can't do hobbies/housework; intake normal or reduced; occasional assist; LOC full/confusion  ___50%  Mainly sit/lie; Extensive disease; Can't do any work; Considerable assist; intake normal or reduced; LOC full/confusion  _x__40%  Mainly in bed; Extensive disease; Mainly assist; intake normal or reduced; LOC full/confusion   ___30%  Bed Bound; Extensive disease; Total care; intake reduced; LOCfull/confusion  ___20%  Bed Bound; Extensive disease; Total care; intake minimal; Drowsy/coma  ___10%  Bed Bound; Extensive disease; Total care; Mouth care only; Drowsy/coma  ___0       Death      Plan      Palliative Interaction: Received update from [address] [place] [address] that [id number] was approved and patient is going to be discharged to SNF this evening. She reports patient is being alert and oriented, but sometimes resistant to speaking with staff. I informed her that I would speak with patient and then reach out to [address] for further support. I visited patient and introduced myself in the palliative role to him. I asked patient how he is feeling, and he reports fine. Patient does not have eye contact with writer, but is communicative with writer. I discussed patient's chronic health issues and current health problems. He reports history of EtOH abuse, and states \" I would drink if I could get it. \"  We discussed patient's goals and I reviewed with him his reasons for refusing EGD/colonoscopy and sometimes therapy. Patient reports being okay with hospitalization and noninvasive treatment, but does not want any invasive procedures or surgeries. I explained the difference in palliative care and hospice care to the patient, and the different options with each. Patient reports still wanting to seek treatment, and is not interested in hospice care. I discussed symptom management with palliative care, and patient denies any present symptoms. I asked patient about weight loss and appetite, and he denies any problems. I asked patient about pain and he denies any present pain.   Patient does have diarrhea, and pressure sores. I explained the outpatient options for palliative care, and patient reports not needed at this time. Patient reports being agreeable to go to rehab. I discussed if patient had HC POA/LW, and he reports no. Patient reports to be single and have 2 biological children. Informed him of the [place] next [place] kin law and he is agreeable for both children to be decision-makers when he is unable to. I explained to him that he could nominate 1 child if he would like and complete this paperwork here, but he denied wanting to do this. Discussed all 3 code classifications with patient in full detail. Patient reports wanting all resuscitative measures including CPR, defibrillation, resuscitative meds, and intubation. I discussed patient's chronic illnesses, and if he thought these measures would be more beneficial to him then burdensome, as risk were explained. I offered patient much emotional support, and informed him that I would reach out to his son Berenice Stein at this time, and he was agreeable. I called patient's son Berenice Stein, and introduced myself in the palliative role. I informed him that I seen his father and offered update. He is aware that patient may be discharged this evening to SNF. We discussed the difference in palliative care and hospice care. We also discussed all 3 code classifications. Berenice Stein reports,\" I did my dad say. \"  I informed him that his dad told me that he wants all resuscitative measures at this time. I informed him that [place] next of kin law looks to both children for [facility], [place] OF [place], [facility]. POA. I explained the duties of [address] [place] [address], and encouraged him to talk with his dad in regards to his wishes. I offered adding much emotional support, and he reports being appreciative of my call.     Education/support to staff  Education/support to family  Education/support to patient  Communications with primary service  Providing support for coping/adaptation/distress of family  Providing support for coping/adaptation/distress of patient  Discussing meaning/purpose   Decisional capacity assessed  Continue with current plan of care  Clarification of medical condition to patient and family  Code status clarified: Full Code  Code status clarified: [facility]  Code status clarified: [facility]  Palliative care orders introduced  Provided information about hospice  Validating patient/family distress  Continued communication updates  Patient is agreeable to go to SNF and will be DC today. At this time he does not feel he needs palliative care. He wants to be a full code as well. Principle Problem/Diagnosis:  Atrial fibrillation with RVR (HCC)    Additional Assessments:   Principal Problem:    Atrial fibrillation with RVR (HCC)  Active Problems:    Elevated brain natriuretic peptide (BNP) level    History of alcoholism (HCC)    Cirrhosis of liver with ascites (HCC)    Chronic atrial fibrillation    Leukocytosis    Anemia    Thrombocytosis (HCC)    Severe malnutrition (HCC)    Sarcopenia    Hyponatremia    Sepsis (HCC)    Malignant melanoma of lower extremity, including hip (HCC)    Dizziness    Acute on chronic diastolic heart failure (HCC)    Acute on chronic congestive heart failure (HCC)  Resolved Problems:    * No resolved hospital problems. *    1- Symptom management/ pain control     Pain Assessment:  The patient is not having any pain. Anxiety:  none                          Dyspnea:  none                          Fatigue:  significant change in weight and exercise intolerance    Other: Malnutrition    We feel the patient symptoms are being controlled. his current regimen is reviewed by myself and discussed with the staff.      2- Goals of care evaluation   The patient goals of care are live longer, improve or maintain function/quality of life, preserve independence/autonomy/control and support for family/caregiver   Goals of care discussed with:    [] Patient independently    [x] Patient and Family    [] Family or Healthcare DPOA independently    [] Unable to discuss with patient, family/DPOA not present    3- Code Status  Full Code    4- Other recommendations   - We will continue to provide comfort and support to the patient and the family  Please call with any palliative questions or concerns. Palliative Care Team is available via perfect serve or via phone. Palliative Care will continue to follow Mr. Elizabeth Mota care as needed. Thank you for allowing Palliative Care to participate in the care of Mr. Rosalina Haider . This note has been dictated by dragon, typing errors may be a possibility. The total time I spent in seeing the patient, discussing goals of care, advanced directives, code status and other major issues was more than 60 minutes      Electronically signed by   GEOFF Knott CNP  Palliative Care Team  on 9/29/2020 at 2:04 PM    Palliative Care can be reached via [employer].

## 2020-09-29 NOTE — PROGRESS NOTES
[facility]  Office: [address], DO, Eun George, DO, Agarowdy Doente, DO, Kimberlyn King Blood, DO, Konstantin Trevino MD, Fernando Mcdaniels MD, Delmi Roque MD, Vini Wolfe MD, Radha Monk MD, Ruba Keller MD, Brissa Liu MD, Latasha Veridn MD, Cal Beavers MD, Syed Brooks, DO, Maik So MD, Gaby Colin MD, Berlin Harris, DO, Manny Grant MD,  Isamar Bautista, DO, Kaye Hoskins MD, Abdoul Medellin MD, Elias Nguyen, [facility], [address], [facility], Rut Chapin, CNP, Manuel Gilmore, CNS, Chiquis Hawk, CNP, Veda Jacob, CNP, Nissa Healy, CNP, Winston Villar, CNP, Georgette Sandoval, CNP, Kalen Puckett PA-C, Chin Ball, [facility], Dwaine Hu, CNP, Radha Cunningham, CNP, Scott Bryson, CNP, Tamanna Kenyon, CNP, Lon Mutton, [facility]    Progress Note    9/29/2020    1:39 PM    Name:   Freddy Fleming  MRN:     [id number]     Acct:      [de-identified]   Room:   [address] [place] [address]-[facility] Day:  10  Admit Date:  9/19/2020 11:24 AM    PCP:   Beryle Kidd, APRN - CNP  Code Status:  Full Code    Subjective:     C/C:   Chief Complaint   Patient presents with    Dizziness    Palpitations     Interval History Status: not changed. Condition is again essentially stable. Patient is apparently agreeable to skilled nursing facility for rehabilitation. Patient states he will work with PT/OT with the intention of improving his quality of life. Patient refuses hospice but is agreeable to a palliative care consultation. Occult stool is positive, however, hemoglobin is stable for the past 7 days. ID has  De-escalated therapy. Urine output remains reduced. White blood cell count significantly increased overnight. Blood cultures and paracentesis cultures remain negative. No clear source for infectious process at this time. Patient condition remains labile. Patient continues to refuse many treatments.   Palliative care consultation is requested. Goals of care discussion is needed. Brief History:     9/19 -very poor historian. Known history of A. fib. Known history of alcoholism. Known history of cancer, likely malignant melanoma. Admitted from the nursing home with near syncope and palpitations. Patient is found to have A. fib with RVR and hypotension patient was treated according to ACLS protocols and cardiology was consulted    9/20 -patient is found to have decreased liver function, ascites, anemia requiring transfusion, elevated white count, and a single positive blood culture. Infectious disease, cardiology, gastroenterology are requested. 9/21 -patient condition has not changed. Patient heart rate and blood pressure is better controlled today. Hemoglobin continues to trend down. Infectious disease continues to follow and is narrowing antimicrobial therapy. We await paracentesis results. 9/22 -transition to oral medications. Await GI recommendations on anemia work-up. Paracentesis results still pending. 9/23 -GI apparently plans scopes for tomorrow. Paracentesis is negative for malignancy, culture results pending. 9/24 -patient condition essentially stable. Cardiology and infectious disease have de-escalate therapy. Anticipate discharge to skilled nursing facility for continued rehabilitation. 9/25 -we await GI intervention versus decision to follow as an outpatient. Patient is agreeable to SNF. Precertification and placement decisions are pending.     9/26-27  EGD and colonoscopy canceled at this time due to poor prep and patient noncompliant with GoLYTELY,  Will be done as outpatient,  Given a dose of Lasix last evening, blood pressure has been running low since then,  Started on IV Fortaz his urine output has dropped to 50 in 4 hours,  We will monitor today possible discharge tomorrow after PT.    9/28-9/29 -elevated white count (ID on) no new recommendations, urinary output reduced, still working towards placement. Review of Systems:     Review of Systems   Constitutional: Positive for activity change, appetite change and fatigue. Negative for chills, diaphoresis and fever. HENT: Negative for congestion, sinus pressure and sinus pain. Eyes: Negative for visual disturbance. Respiratory: Negative for cough, chest tightness, shortness of breath and wheezing. Cardiovascular: Positive for leg swelling. Negative for chest pain and palpitations. Gastrointestinal: Positive for diarrhea. Negative for abdominal pain, blood in stool, constipation, nausea and vomiting. Endocrine: Negative for cold intolerance and heat intolerance. Genitourinary: Negative for difficulty urinating, flank pain and frequency. Musculoskeletal: Positive for arthralgias. Skin: Positive for pallor. Negative for color change. Neurological: Positive for weakness and light-headedness. Negative for dizziness, numbness and headaches. Psychiatric/Behavioral: Negative for agitation and confusion. All other systems reviewed and are negative. Medications:      Allergies:  No Known Allergies    Current Meds:   Scheduled Meds:    furosemide  20 mg Oral Daily    polyethylene glycol  2,000 mL Oral Once    midodrine  10 mg Oral TID    dilTIAZem  30 mg Oral 4 times per day    ferrous sulfate  325 mg Oral TID WC    escitalopram  20 mg Oral Daily    mirtazapine  15 mg Oral Nightly    magnesium oxide  400 mg Oral Daily    gabapentin  300 mg Oral TID    [START ON 10/5/2020] vitamin D  50,000 Units Oral Q30 Days    pantoprazole  40 mg Oral BID AC    sodium chloride flush  10 mL Intravenous 2 times per day    digoxin  125 mcg Oral Daily    metoprolol tartrate  12.5 mg Oral BID    apixaban  5 mg Oral BID     Continuous Infusions:    sodium chloride 50 mL/hr at 09/29/20 0030     PRN Meds: oxyCODONE **OR** [DISCONTINUED] oxyCODONE, potassium chloride **OR** potassium alternative oral replacement **OR** potassium chloride, sodium chloride flush, sodium chloride flush    Data:     Past Medical History:   has a past medical history of History of alcoholism ([address] and Hypertension. Social History:   reports that he has been smoking. He has been smoking about 0.50 packs per day. He has never used smokeless tobacco. He reports current alcohol use. He reports that he does not use drugs. Family History: History reviewed. No pertinent family history. Vitals:  /64   Pulse 58   Temp 97.3 °F (36.3 °C) (Oral)   Resp 20   Ht 5' 10\" (1.778 m)   Wt 116 lb 12.8 oz (53 kg)   SpO2 94%   BMI 16.76 kg/m²   Temp (24hrs), Av.8 °F (36.6 °C), Min:97.3 °F (36.3 °C), Max:98.6 °F (37 °C)    No results for input(s): POCGLU in the last 72 hours. I/O (24Hr): Intake/Output Summary (Last 24 hours) at 2020 1339  Last data filed at 2020 0858  Gross per 24 hour   Intake 1354 ml   Output 900 ml   Net 454 ml       Labs:  Hematology:  Recent Labs     20  0516 20  0511   WBC 11.1 21.3* 17.8*   RBC 3.51* 3.69* 3.83*   HGB 8.2* 8.7* 8.9*   HCT 28.6* 30.9* 32.2*   MCV 81.5* 83.7 84.1   MCH 23.4* 23.6* 23.2*   MCHC 28.7 28.2* 27.6*   RDW 19.3* 19.6* 20.1*    274 162   MPV 10.4 10.9 11.6     Chemistry:  Recent Labs     20  0516 20  0511    136 141   K 3.7 3.7 4.0   * 107 113*   CO2 23 17* 17*   GLUCOSE 97 96 98   BUN 19 23 25*   CREATININE 0.82 1.05 0.87   ANIONGAP 7* 12 11   LABGLOM >60 >60 >60   GFRAA >60 >60 >60   CALCIUM 7.0* 7.3* 7.3*     No results for input(s): PROT, LABALBU, LABA1C, G5MTCPW, Q6NJYSY, FT4, TSH, AST, ALT, LDH, GGT, ALKPHOS, LABGGT, BILITOT, BILIDIR, AMMONIA, AMYLASE, LIPASE, LACTATE, CHOL, HDL, LDLCHOLESTEROL, CHOLHDLRATIO, TRIG, VLDL, DEV82WZ, PHENYTOIN, PHENYF, URICACID, POCGLU in the last 72 hours.   ABG:No results found for: POCPH, PHART, PH, POCPCO2, QLZ9ZDV, PCO2, POCPO2, PO2ART, PO2, POCHCO3, NFS0BMS, HCO3, NBEA, PBEA, BEART, BE, THGBART, THB, AQA1GSG, NQAX5WIM, X5PMIIUK, O2SAT, FIO2  Lab Results   Component Value Date/Time    SPECIAL NOT REPORTED 09/21/2020 12:00 PM     Lab Results   Component Value Date/Time    CULTURE NO GROWTH 6 DAYS 09/21/2020 12:00 PM       Radiology:  Ct Abdomen Pelvis W Iv Contrast    Result Date: 9/19/2020  1. Moderate right and small left pleural effusions with compressive atelectasis at the lung bases in this patient with emphysematous changes. 2.  Prominent gastric fold thickening most compatible with gastritis. 3.  Liver appears slightly lobular. Additionally, abdominal ascites is noted. The constellation of findings are consistent with cirrhosis. 4.  Enlarged prostate. Bladder wall appears thickened although bladder is suboptimally distended. 5.  Atherosclerotic disease. 6.  No bowel obstruction or urinary obstruction although gallstones are present. No evidence of appendicitis. Xr Chest Portable    Result Date: 9/19/2020  1. Right lower lobe airspace opacity potentially due to atelectasis, pneumonia, or aspiration. 2. Bony demineralization partially limits evaluation for fractures. Ct Chest Pulmonary Embolism W Contrast    Result Date: 9/19/2020  1. No evidence of pulmonary embolism. 2.  Emphysematous changes. 3. small to moderate right pleural effusion with trace left effusion and basilar atelectasis. 4.  Mild ascites upper abdomen. Physical Examination:        Physical Exam  Vitals signs and nursing note reviewed. Constitutional:       General: He is not in acute distress. Appearance: He is well-developed. He is not diaphoretic. Comments: Underweight and malnourished, poor personal hygiene   HENT:      Head: Normocephalic and atraumatic. Right Ear: Hearing normal.      Left Ear: Hearing normal.      Nose: Nose normal. No rhinorrhea. Eyes:      General: Lids are normal.      Extraocular Movements:      Right eye: Normal extraocular motion.       Left eye: Normal extraocular motion. Conjunctiva/sclera: Conjunctivae normal.      Right eye: Right conjunctiva is not injected. Left eye: Left conjunctiva is not injected. Pupils: Pupils are equal, round, and reactive to light. Pupils are equal.      Right eye: Pupil is reactive. Left eye: Pupil is reactive. Neck:      Musculoskeletal: Neck supple. Thyroid: No thyromegaly. Vascular: No carotid bruit. Trachea: Trachea and phonation normal. No tracheal deviation. Cardiovascular:      Rate and Rhythm: Normal rate. Rhythm irregular. Pulses: Normal pulses. Heart sounds: Normal heart sounds. No murmur. Pulmonary:      Effort: Pulmonary effort is normal. No respiratory distress. Breath sounds: No stridor. Examination of the right-lower field reveals decreased breath sounds. Decreased breath sounds present. Abdominal:      General: Bowel sounds are normal. There is no distension. Palpations: Abdomen is soft. There is no mass. Tenderness: There is no abdominal tenderness. There is no guarding. Musculoskeletal:         General: No tenderness. Right lower leg: Edema present. Comments: Left above-knee amputation   Skin:     General: Skin is warm and dry. Findings: No erythema, lesion or rash.         Assessment:        Hospital Problems           Last Modified POA    * (Principal) Atrial fibrillation with RVR ([address] 9/19/2020 Yes    Elevated brain natriuretic peptide (BNP) level 9/19/2020 Yes    History of alcoholism ([address] 9/19/2020 Yes    Cirrhosis of liver with ascites ([address] 9/19/2020 Yes    Chronic atrial fibrillation 9/19/2020 Yes    Leukocytosis 9/19/2020 Yes    Anemia 9/21/2020 Yes    Thrombocytosis ([address] 9/19/2020 Yes    Severe malnutrition ([address] (Chronic) 9/26/2020 Yes    Sarcopenia 9/19/2020 Yes    Hyponatremia 9/19/2020 Yes    Sepsis ([address] 9/20/2020 Yes    Malignant melanoma of lower extremity, including hip ([address] 9/20/2020 Yes    Overview Signed 9/20/2020  3:34 PM by GEOFF Patino CNP     Hx of left foot with left groin lymph node positive for melanoma         Dizziness 9/20/2020 Yes    Acute on chronic diastolic heart failure ([facility] 9/22/2020 Yes    Acute on chronic congestive heart failure ([facility] 9/22/2020 Yes          Plan:          · Add palliative care  · PT/OT for outpatient recommendations on discharge, anticipate SNF, patient still agreeable, pre-CERT n [id number] pending  · Nectar thick diet due to swallow study recommendations  · Continue to follow paracentesis and blood cultures, thus far negative, negative for malignancy  · Prognosis seems poor, add palliative care consultation for goals of care discussion.     · Await [id number] review for SNF, anticipate SNF placement versus long-term        GEOFF Mcgill NP  9/29/2020  1:39 PM

## 2020-09-29 NOTE — DISCHARGE SUMMARY
[facility]  Office: [address], DO, Huber Winters, DO, Syl Mendoza, DO, Luz Parkervish Blood, DO, Jorge Grissom MD, Watson Lazar MD, Enrico Aleman MD, Erica Wade MD, Eduardo Casillas MD, Fernando Richardson MD, Hoa Andre MD, Zehra Tejeda MD, Cal Ruano MD, Jose Roberto, DO, Nydia Pardo MD, Jacy Decker MD, Farhat Lopez DO, Serenity Pineda MD,  Glen Medina, DO, Gerda Monge MD, Jim Figueroa MD, Ivette Mcdonald [facility], [facility], CNP, Arslan Chinchilla, CNP, Rico Muñoz, CNS, Noah Barthel, CNP, Alfonso Burks, CNP, Francois Mares, CNP, Chacha Hernandez, CNP, Anjelcia Paul, CNP, Lex Anderson PA-C, [facility], [facility], Kimberley Mcmahan, CNP, Mirella Fortune, CNP, Sergio Durham, CNP, Kosta Oz, CNP, Amaris Jones, [facility]    Discharge Summary     Patient ID: Lupis Carmona  :  1945   MRN: [id number]     ACCOUNT:  [de-identified]   Patient's PCP: GEOFF Waters CNP  Admit Date: 2020   Discharge Date: 2020     Length of Stay: 10  Code Status:  Full Code  Admitting Physician: Jacy Decker MD  Discharge Physician: GEOFF Nowak NP     Active Discharge Diagnoses:     Hospital Problem Lists:  Principal Problem:    Atrial fibrillation with RVR ([facility]  Active Problems:    Elevated brain natriuretic peptide (BNP) level    History of alcoholism ([facility]    Cirrhosis of liver with ascites (HCC)    Chronic atrial fibrillation    Leukocytosis    Anemia    Thrombocytosis (HCC)    Severe malnutrition (HCC)    Sarcopenia    Hyponatremia    Sepsis ([facility]    Malignant melanoma of lower extremity, including hip (HCC)    Dizziness    Acute on chronic diastolic heart failure (HCC)    Acute on chronic congestive heart failure ([address]  Resolved Problems:    * No resolved hospital problems.  *      Admission Condition:  poor     Discharged Condition: stable    Hospital Stay:     CARMEN HEARD Course:  Jojo Wilson is a 77 y.o. male who was admitted for the management of  Atrial fibrillation with RVR ([facility] , presented to ER with Dizziness and Palpitations    9/19 -very poor historian. Known history of A. fib. Known history of alcoholism. Known history of cancer, likely malignant melanoma. Admitted from the nursing home with near syncope and palpitations. Patient is found to have A. fib with RVR and hypotension patient was treated according to ACLS protocols and cardiology was consulted     9/20 -patient is found to have decreased liver function, ascites, anemia requiring transfusion, elevated white count, and a single positive blood culture. Infectious disease, cardiology, gastroenterology are requested.     9/21 -patient condition has not changed. Patient heart rate and blood pressure is better controlled today. Hemoglobin continues to trend down. Infectious disease continues to follow and is narrowing antimicrobial therapy. We await paracentesis results.     9/22 -transition to oral medications. Await GI recommendations on anemia work-up. Paracentesis results still pending.     9/23 -GI apparently plans scopes for tomorrow. Paracentesis is negative for malignancy, culture results pending.     9/24 -patient condition essentially stable. Cardiology and infectious disease have de-escalate therapy. Anticipate discharge to skilled nursing facility for continued rehabilitation.     9/25 -we await GI intervention versus decision to follow as an outpatient. Patient is agreeable to SNF.   Precertification and placement decisions are pending.     9/26-27  EGD and colonoscopy canceled at this time due to poor prep and patient noncompliant with GoLYTELY,  Will be done as outpatient,  Given a dose of Lasix last evening, blood pressure has been running low since then,  Started on IV Fortaz his urine output has dropped to 50 in 4 hours,  We will monitor today possible discharge tomorrow after PT.     9/28-9/29 -elevated white count (ID on) no new recommendations, urinary output reduced, still working towards placement.         Significant therapeutic interventions: Extended hospital course. See above summary. Significant Diagnostic Studies:   Labs / Micro:  CBC:   Lab Results   Component Value Date    WBC 17.8 09/29/2020    RBC 3.83 09/29/2020    HGB 8.9 09/29/2020    HCT 32.2 09/29/2020    MCV 84.1 09/29/2020    MCH 23.2 09/29/2020    MCHC 27.6 09/29/2020    RDW 20.1 09/29/2020     09/29/2020     BMP:    Lab Results   Component Value Date    GLUCOSE 98 09/29/2020     09/29/2020    K 4.0 09/29/2020     09/29/2020    CO2 17 09/29/2020    ANIONGAP 11 09/29/2020    BUN 25 09/29/2020    CREATININE 0.87 09/29/2020    BUNCRER 29 09/29/2020    CALCIUM 7.3 09/29/2020    LABGLOM >60 09/29/2020    GFRAA >60 09/29/2020    GFR      09/29/2020    GFR NOT REPORTED 09/29/2020     U/A:    Lab Results   Component Value Date    COLORU YELLOW 09/19/2020    TURBIDITY CLEAR 09/19/2020    SPECGRAV 1.025 09/19/2020    HGBUR NEGATIVE 09/19/2020    PHUR 5.5 09/19/2020    PROTEINU NEGATIVE 09/19/2020    GLUCOSEU NEGATIVE 09/19/2020    KETUA 1+ 09/19/2020    BILIRUBINUR NEGATIVE 09/19/2020    UROBILINOGEN Normal 09/19/2020    NITRU NEGATIVE 09/19/2020    LEUKOCYTESUR NEGATIVE 09/19/2020     TSH:  No results found for: TSH     Radiology:  Xr Chest (2 Vw)    Result Date: 9/25/2020  1. Persistent bibasilar opacities, right greater than left, likely representing a combination of atelectasis and or pneumonia.   Changes are not significantly different compared to September 21, however are progressed since the study of September 19, 2020       Consultations:    Consults:     Final Specialist Recommendations/Findings:   IP CONSULT TO HOSPITALIST  IP CONSULT TO CARDIOLOGY  IP CONSULT TO INFECTIOUS DISEASES  IP CONSULT TO GI  IP CONSULT TO PALLIATIVE CARE      The patient was seen and examined on day of discharge and this discharge summary is in conjunction with any daily progress note from day of discharge. Discharge plan:     Disposition: SNF    Physician Follow Up:     No follow-up provider specified. Requiring Further Evaluation/Follow Up POST HOSPITALIZATION/Incidental Findings: Deteriorating health overall. Diet: regular diet    Activity: As tolerated    Instructions to Patient: Engage with therapy and effort to regain strength energy regain her independence.     Discharge Medications:      Medication List      START taking these medications    apixaban 5 MG Tabs tablet  Commonly known as:  ELIQUIS  Take 1 tablet by mouth 2 times daily     cephALEXin 500 MG capsule  Commonly known as:  KEFLEX  Take 1 capsule by mouth 4 times daily     digoxin 125 MCG tablet  Commonly known as:  LANOXIN  Take 1 tablet by mouth daily  Start taking on:  September 30, 2020     dilTIAZem 120 MG extended release capsule  Commonly known as:  CARDIZEM CD  Take 1 capsule by mouth daily     furosemide 20 MG tablet  Commonly known as:  LASIX  Take 1 tablet by mouth daily  Start taking on:  September 30, 2020     metoprolol tartrate 25 MG tablet  Commonly known as:  LOPRESSOR  Take 0.5 tablets by mouth 2 times daily     midodrine 10 MG tablet  Commonly known as:  PROAMATINE  Take 1 tablet by mouth 3 times daily        CONTINUE taking these medications    alendronate 70 MG tablet  Commonly known as:  FOSAMAX     bethanechol 25 MG tablet  Commonly known as:  URECHOLINE     bisacodyl 5 MG EC tablet  Commonly known as:  DULCOLAX     dutasteride 0.5 MG capsule  Commonly known as:  AVODART     ferrous sulfate 325 (65 Fe) MG EC tablet  Commonly known as:  FE TABS 325     gabapentin 600 MG tablet  Commonly known as:  NEURONTIN     HYDROcodone-acetaminophen 7.5-325 MG per tablet  Commonly known as:  NORCO     Lexapro 20 MG tablet  Generic drug:  escitalopram     lidocaine 4 % external patch     magnesium oxide 400 MG tablet  Commonly known as: MAG-OX     mirtazapine 15 MG tablet  Commonly known as:  REMERON     pantoprazole 40 MG tablet  Commonly known as:  PROTONIX     senna-docusate 8.6-50 MG per tablet  Commonly known as:  PERICOLACE     tamsulosin 0.4 MG capsule  Commonly known as:  FLOMAX     vitamin D 1.25 MG (27997 UT) Caps capsule  Commonly known as:  ERGOCALCIFEROL           Where to Get Your Medications      These medications were sent to [facility], [address] [phone] - f [phone]  [address] [place] [id number]    Phone:  [phone]   · apixaban 5 MG Tabs tablet  · cephALEXin 500 MG capsule  · digoxin 125 MCG tablet  · dilTIAZem 120 MG extended release capsule  · furosemide 20 MG tablet  · metoprolol tartrate 25 MG tablet  · midodrine 10 MG tablet         No discharge procedures on file. Time Spent on discharge is  34 mins in patient examination, evaluation, counseling as well as medication reconciliation, prescriptions for required medications, discharge plan and follow up. Electronically signed by   GEOFF Morales NP  9/29/2020  5:35 PM      Thank you GEOFF Shields - LOWELL for the opportunity to be involved in this patient's care.

## 2020-09-29 NOTE — DISCHARGE INSTR - COC
Continuity of Care Form    Patient Name: Jojo Wilson   :  1945  MRN:  [id number]    Admit date:  2020  Discharge date:  2020    Code Status Order: Full Code   Advance Directives:   [facility] Documentation       Date/Time Healthcare Directive Type of Healthcare Directive Copy in [facility] [address] Agent's Name Healthcare Agent's Phone Number    20 1848  No, patient does not have an advance directive for healthcare treatment -- -- -- -- --            Admitting Physician:  Juancho Cash MD  PCP: GEOFF Ruby - CNP    Discharging Nurse: [facility], [place] [address] Unit/Room#: 9505/0077-61  Discharging Unit Phone Number: [phone]    Emergency Contact:   Extended Emergency Contact Information  Primary Emergency Contact: Rowena Villa  Home Phone: [phone]  Work Phone: [phone]  Mobile Phone: [phone]  Relation: Child  Secondary Emergency Contact: Mono Devine  Mobile Phone: [phone]  Relation: Child    Past Surgical History:  Past Surgical History:   Procedure Laterality Date    HERNIA REPAIR      HYDROCELE EXCISION         Immunization History: There is no immunization history on file for this patient.     Active Problems:  Patient Active Problem List   Diagnosis Code    Atrial fibrillation with RVR (HCC) I48.91    Elevated brain natriuretic peptide (BNP) level R79.89    History of alcoholism (HCC) F10.21    Other ascites R18.8    Cirrhosis of liver with ascites (HCC) K74.60, R18.8    Chronic atrial fibrillation I48.20    Leukocytosis D72.829    Anemia D64.9    Thrombocytosis (HCC) D47.3    Severe malnutrition (HCC) E43    Sarcopenia M62.84    Hyponatremia E87.1    Sepsis (HCC) A41.9    Malignant melanoma of lower extremity, including hip (HCC) C43.70    Dizziness R42    Acute on chronic diastolic heart failure (HCC) I50.33    Acute on chronic congestive heart failure (HCC) I50.9 7.56 cm^2 09/22/20 1502   Wound Volume (cm^3) 0.76 cm^3 09/22/20 1502   Wound Assessment Epithelialization;Granulation tissue;Painful;Red 09/29/20 1033   Drainage Amount None 09/29/20 1033   Drainage Description Serosanguinous 09/26/20 1555   Odor None 09/29/20 1033   Margins Defined edges 09/29/20 1033   Evelia-wound Assessment Clean;Dry; Intact;Pink;Painful 09/29/20 1033   Red%Wound Bed 75 09/22/20 1502   Yellow%Wound Bed 25 09/22/20 1502   Culture Taken No 09/26/20 1555   Number of days:        Wound Ear Left behind ear (Active)   Wound Image   09/22/20 1502   Wound Pressure Stage  3 09/26/20 1555   Offloading for Diabetic Foot Ulcers Other (comment) 09/26/20 1555   Dressing Status Clean;Dry; Intact 09/29/20 1424   Dressing Changed Other (Comment) 09/29/20 1424   Dressing/Treatment Alginate; Foam 09/29/20 1424   Wound Cleansed Rinsed/Irrigated with saline 09/28/20 0300   Dressing Change Due 09/28/20 09/26/20 1555   Wound Length (cm) 1.2 cm 09/22/20 1502   Wound Width (cm) 0.9 cm 09/22/20 1502   Wound Depth (cm) 0.3 cm 09/22/20 1502   Wound Surface Area (cm^2) 1.08 cm^2 09/22/20 1502   Wound Volume (cm^3) 0.32 cm^3 09/22/20 1502   Wound Assessment Other (Comment) 09/29/20 1424   Drainage Amount None 09/28/20 0300   Drainage Description Serosanguinous 09/26/20 1555   Odor None 09/26/20 1555   Margins Attached edges; Defined edges 09/28/20 0300   Evelia-wound Assessment Other (Comment) 09/29/20 1424   [place]%Wound Bed 75 09/22/20 1502   Yellow%Wound Bed 25 09/22/20 1502   Culture Taken No 09/26/20 1555   Number of days:         Elimination:  Continence:   · Bowel: No  · Bladder: No  Urinary Catheter: Removal Date 9/29   Colostomy/Ileostomy/Ileal Conduit: No  Rectal Tube With balloon-Stool Appearance: Loose  Rectal Tube With balloon-Stool Color: Brown, Red Streak  Rectal Tube With balloon-Stool Amount: Large    Date of Last BM:      Intake/Output Summary (Last 24 hours) at 9/29/2020 1432  Last data filed at 9/29/2020 1415  Gross per 24 hour   Intake 1354 ml   Output 950 ml   Net 404 ml     I/O last 3 completed shifts: In: 8044 [I.V.:1204]  Out: 900 [Urine:300; Stool:600]    Safety Concerns:     Sundowners [place], History of Falls (last 30 days) and At Risk for Falls    Impairments/Disabilities:      Vision    Nutrition Therapy:  Current Nutrition Therapy:   - Oral Diet:  General    Routes of Feeding: Oral  Liquids: Nectar Thick Liquids  Daily Fluid Restriction: no  Last Modified Barium Swallow with Video (Video Swallowing Test): done on 9/21    Treatments at the Time of Hospital Discharge:   Respiratory Treatments:  See mar   Oxygen Therapy:  is not on home oxygen therapy. Ventilator:    - No ventilator support    Rehab Therapies: Physical Therapy and Occupational Therapy  Weight Bearing Status/Restrictions: No weight bearing restirctions  Other Medical Equipment (for information only, NOT a DME order):  wheelchair, walker, bath bench and bedside commode  Other Treatments:   1.  Skilled RN assessment     Patient's personal belongings (please select all that are sent with patient):  Glasses    RN SIGNATURE:  Electronically signed by Darryn Hung RN on 9/29/20 at 3:06 PM EDT    CASE MANAGEMENT/SOCIAL WORK SECTION    Inpatient Status Date: 1003-2    Readmission Risk Assessment Score:  Readmission Risk              Risk of Unplanned Readmission:        26           Discharging to Facility/ Agency   · Name: [facility]  · Address: [address] [place] [address]  · Phone:[phone]  · [facility]:[phone]    Dialysis Facility (if applicable)   · Name:  · Address:  · Dialysis Schedule:  · Phone:  · Fax:    / signature: Electronically signed by Ac Perez RN on 9/29/20 at 2:33 PM EDT    PHYSICIAN SECTION    Prognosis: Guarded    Condition at Discharge: Stable    Rehab Potential (if transferring to Rehab): Guarded    Recommended Labs or Other Treatments After Discharge: Intensive PT/OT    Physician Certification: I certify the above information and transfer of Dago Camacho  is necessary for the continuing treatment of the diagnosis listed and that he requires Juan Jose Michoacano for greater 30 days.      Update Admission H&P: No change in H&P    PHYSICIAN SIGNATURE:  Electronically signed by Akash Martinez DO on 9/29/20 at 3:02 PM EDT

## 2020-09-29 NOTE — CARE COORDINATION
SHANNON received email from Dr. Emeterio Dash regarding the peer to peer has been approved. SHANNON called [facility] to inform, once notified by the insurance company pt can admit today. Jase Genao from [employer] [facility] called to confirm the pt has been approved and can admit today.   SHANNON updated pt RN Monico Johnson regarding approval.   Monico Johnson will keep SHANNON informed once everything is in place for discharge   RN to call report [phone]

## 2020-09-29 NOTE — FLOWSHEET NOTE
Patient resting in bed; expresses no spiritual/emotional needs at this time; thanks writer for visiting. Writer provides presence and support. Spiritual Care will follow as needed.      09/29/20 1147   Encounter Summary   Services provided to: Patient   Referral/Consult From: Rounding;Palliative Care   Continue Visiting   (9/29/20)   Complexity of Encounter Low   Length of Encounter 15 minutes   Routine   Type Follow up   Assessment Passive   Intervention Sustaining presence/ Ministry of presence   Outcome Refused/declined

## 2020-10-01 ENCOUNTER — HOSPITAL ENCOUNTER (OUTPATIENT)
Age: 75
Setting detail: SPECIMEN
Discharge: HOME | End: 2020-10-01
Payer: COMMERCIAL

## 2020-10-01 LAB
ABSOLUTE EOS #: 0 K/UL (ref 0–0.44)
ABSOLUTE IMMATURE GRANULOCYTE: 0.09 K/UL (ref 0–0.3)
ABSOLUTE LYMPH #: 1.85 K/UL (ref 1.1–3.7)
ABSOLUTE MONO #: 0.53 K/UL (ref 0.1–1.2)
ANION GAP SERPL CALCULATED.3IONS-SCNC: 10 MMOL/L (ref 9–17)
BASOPHILS # BLD: 0 % (ref 0–2)
BASOPHILS ABSOLUTE: 0 K/UL (ref 0–0.2)
BUN BLDV-MCNC: 19 MG/DL (ref 8–23)
BUN/CREAT BLD: ABNORMAL (ref 9–20)
CALCIUM SERPL-MCNC: 7.2 MG/DL (ref 8.6–10.4)
CHLORIDE BLD-SCNC: 111 MMOL/L (ref 98–107)
CO2: 18 MMOL/L (ref 20–31)
CREAT SERPL-MCNC: 0.66 MG/DL (ref 0.7–1.2)
DIFFERENTIAL TYPE: ABNORMAL
EOSINOPHILS RELATIVE PERCENT: 0 % (ref 1–4)
GFR AFRICAN AMERICAN: >60 ML/MIN
GFR NON-AFRICAN AMERICAN: >60 ML/MIN
GFR SERPL CREATININE-BSD FRML MDRD: ABNORMAL ML/MIN/{1.73_M2}
GFR SERPL CREATININE-BSD FRML MDRD: ABNORMAL ML/MIN/{1.73_M2}
GLUCOSE BLD-MCNC: 70 MG/DL (ref 70–99)
HCT VFR BLD CALC: 26.8 % (ref 40.7–50.3)
HEMOGLOBIN: 7.4 G/DL (ref 13–17)
IMMATURE GRANULOCYTES: 1 %
LYMPHOCYTES # BLD: 21 % (ref 24–43)
MCH RBC QN AUTO: 22.6 PG (ref 25.2–33.5)
MCHC RBC AUTO-ENTMCNC: 27.6 G/DL (ref 28.4–34.8)
MCV RBC AUTO: 81.7 FL (ref 82.6–102.9)
MONOCYTES # BLD: 6 % (ref 3–12)
MORPHOLOGY: ABNORMAL
NRBC AUTOMATED: 0.2 PER 100 WBC
PDW BLD-RTO: 20.1 % (ref 11.8–14.4)
PLATELET # BLD: 334 K/UL (ref 138–453)
PLATELET ESTIMATE: ABNORMAL
PMV BLD AUTO: 11.5 FL (ref 8.1–13.5)
POTASSIUM SERPL-SCNC: 3.9 MMOL/L (ref 3.7–5.3)
RBC # BLD: 3.28 M/UL (ref 4.21–5.77)
RBC # BLD: ABNORMAL 10*6/UL
SEG NEUTROPHILS: 72 % (ref 36–65)
SEGMENTED NEUTROPHILS ABSOLUTE COUNT: 6.33 K/UL (ref 1.5–8.1)
SODIUM BLD-SCNC: 139 MMOL/L (ref 135–144)
WBC # BLD: 8.8 K/UL (ref 3.5–11.3)
WBC # BLD: ABNORMAL 10*3/UL

## 2020-10-01 PROCEDURE — 36415 COLL VENOUS BLD VENIPUNCTURE: CPT

## 2020-10-01 PROCEDURE — P9603 ONE-WAY ALLOW PRORATED MILES: HCPCS

## 2020-10-01 PROCEDURE — 80048 BASIC METABOLIC PNL TOTAL CA: CPT

## 2020-10-01 PROCEDURE — 85025 COMPLETE CBC W/AUTO DIFF WBC: CPT

## 2020-10-02 ENCOUNTER — HOSPITAL ENCOUNTER (OUTPATIENT)
Age: 75
Setting detail: SPECIMEN
Discharge: HOME | End: 2020-10-02
Payer: COMMERCIAL

## 2020-10-02 LAB
ALBUMIN SERPL-MCNC: 1.5 G/DL (ref 3.5–5.2)
ALBUMIN/GLOBULIN RATIO: 0.6 (ref 1–2.5)
ALP BLD-CCNC: 165 U/L (ref 40–129)
ALT SERPL-CCNC: 17 U/L (ref 5–41)
ANION GAP SERPL CALCULATED.3IONS-SCNC: 13 MMOL/L (ref 9–17)
AST SERPL-CCNC: 24 U/L
BILIRUB SERPL-MCNC: 0.36 MG/DL (ref 0.3–1.2)
BUN BLDV-MCNC: 23 MG/DL (ref 8–23)
BUN/CREAT BLD: ABNORMAL (ref 9–20)
CALCIUM SERPL-MCNC: 7.1 MG/DL (ref 8.6–10.4)
CHLORIDE BLD-SCNC: 110 MMOL/L (ref 98–107)
CO2: 17 MMOL/L (ref 20–31)
CREAT SERPL-MCNC: 0.72 MG/DL (ref 0.7–1.2)
GFR AFRICAN AMERICAN: >60 ML/MIN
GFR NON-AFRICAN AMERICAN: >60 ML/MIN
GFR SERPL CREATININE-BSD FRML MDRD: ABNORMAL ML/MIN/{1.73_M2}
GFR SERPL CREATININE-BSD FRML MDRD: ABNORMAL ML/MIN/{1.73_M2}
GLUCOSE BLD-MCNC: 90 MG/DL (ref 70–99)
HCT VFR BLD CALC: 28.6 % (ref 40.7–50.3)
HEMOGLOBIN: 8 G/DL (ref 13–17)
MCH RBC QN AUTO: 23.1 PG (ref 25.2–33.5)
MCHC RBC AUTO-ENTMCNC: 28 G/DL (ref 28.4–34.8)
MCV RBC AUTO: 82.4 FL (ref 82.6–102.9)
NRBC AUTOMATED: 0.2 PER 100 WBC
PDW BLD-RTO: 20.5 % (ref 11.8–14.4)
PLATELET # BLD: 457 K/UL (ref 138–453)
PMV BLD AUTO: 11.4 FL (ref 8.1–13.5)
POTASSIUM SERPL-SCNC: 3.8 MMOL/L (ref 3.7–5.3)
RBC # BLD: 3.47 M/UL (ref 4.21–5.77)
SODIUM BLD-SCNC: 140 MMOL/L (ref 135–144)
TOTAL PROTEIN: 4.1 G/DL (ref 6.4–8.3)
WBC # BLD: 10.2 K/UL (ref 3.5–11.3)

## 2020-10-02 PROCEDURE — 80053 COMPREHEN METABOLIC PANEL: CPT

## 2020-10-02 PROCEDURE — 36415 COLL VENOUS BLD VENIPUNCTURE: CPT

## 2020-10-02 PROCEDURE — 85027 COMPLETE CBC AUTOMATED: CPT

## 2020-10-02 PROCEDURE — P9603 ONE-WAY ALLOW PRORATED MILES: HCPCS
